# Patient Record
Sex: MALE | Race: WHITE | NOT HISPANIC OR LATINO | Employment: FULL TIME | URBAN - METROPOLITAN AREA
[De-identification: names, ages, dates, MRNs, and addresses within clinical notes are randomized per-mention and may not be internally consistent; named-entity substitution may affect disease eponyms.]

---

## 2017-02-23 ENCOUNTER — ALLSCRIPTS OFFICE VISIT (OUTPATIENT)
Dept: OTHER | Facility: OTHER | Age: 47
End: 2017-02-23

## 2017-09-06 ENCOUNTER — GENERIC CONVERSION - ENCOUNTER (OUTPATIENT)
Dept: OTHER | Facility: OTHER | Age: 47
End: 2017-09-06

## 2017-09-20 ENCOUNTER — GENERIC CONVERSION - ENCOUNTER (OUTPATIENT)
Dept: OTHER | Facility: OTHER | Age: 47
End: 2017-09-20

## 2017-10-03 ENCOUNTER — GENERIC CONVERSION - ENCOUNTER (OUTPATIENT)
Dept: OTHER | Facility: OTHER | Age: 47
End: 2017-10-03

## 2017-10-11 ENCOUNTER — GENERIC CONVERSION - ENCOUNTER (OUTPATIENT)
Dept: OTHER | Facility: OTHER | Age: 47
End: 2017-10-11

## 2017-11-08 ENCOUNTER — GENERIC CONVERSION - ENCOUNTER (OUTPATIENT)
Dept: OTHER | Facility: OTHER | Age: 47
End: 2017-11-08

## 2018-01-05 ENCOUNTER — GENERIC CONVERSION - ENCOUNTER (OUTPATIENT)
Dept: FAMILY MEDICINE CLINIC | Facility: CLINIC | Age: 48
End: 2018-01-05

## 2018-01-13 VITALS
DIASTOLIC BLOOD PRESSURE: 88 MMHG | HEIGHT: 73 IN | TEMPERATURE: 97.4 F | SYSTOLIC BLOOD PRESSURE: 134 MMHG | BODY MASS INDEX: 32.12 KG/M2 | WEIGHT: 242.38 LBS | HEART RATE: 68 BPM

## 2018-01-22 VITALS
HEART RATE: 70 BPM | RESPIRATION RATE: 16 BRPM | DIASTOLIC BLOOD PRESSURE: 70 MMHG | SYSTOLIC BLOOD PRESSURE: 120 MMHG | BODY MASS INDEX: 32.74 KG/M2 | HEIGHT: 73 IN | WEIGHT: 247 LBS | TEMPERATURE: 96.8 F

## 2018-03-10 RX ORDER — LOSARTAN POTASSIUM AND HYDROCHLOROTHIAZIDE 12.5; 5 MG/1; MG/1
1 TABLET ORAL DAILY
COMMUNITY
Start: 2014-07-11 | End: 2018-11-06 | Stop reason: SDUPTHER

## 2018-03-13 ENCOUNTER — OFFICE VISIT (OUTPATIENT)
Dept: FAMILY MEDICINE CLINIC | Facility: CLINIC | Age: 48
End: 2018-03-13
Payer: COMMERCIAL

## 2018-03-13 VITALS
RESPIRATION RATE: 20 BRPM | HEIGHT: 72 IN | TEMPERATURE: 97.7 F | HEART RATE: 72 BPM | DIASTOLIC BLOOD PRESSURE: 82 MMHG | WEIGHT: 281.4 LBS | SYSTOLIC BLOOD PRESSURE: 130 MMHG | BODY MASS INDEX: 38.11 KG/M2

## 2018-03-13 DIAGNOSIS — M77.51 BURSITIS OF HEEL, RIGHT: ICD-10-CM

## 2018-03-13 DIAGNOSIS — B35.1 ONYCHOMYCOSIS: ICD-10-CM

## 2018-03-13 DIAGNOSIS — I10 ESSENTIAL HYPERTENSION: Primary | ICD-10-CM

## 2018-03-13 PROBLEM — K13.70 LESION OF MOUTH: Status: ACTIVE | Noted: 2018-03-13

## 2018-03-13 PROCEDURE — 99214 OFFICE O/P EST MOD 30 MIN: CPT | Performed by: FAMILY MEDICINE

## 2018-03-13 NOTE — PROGRESS NOTES
FAMILY PRACTICE OFFICE VISIT       NAME: Bob Martínez  AGE: 50 y o  SEX: male       : 1970        MRN: [id number]    DATE: 3/13/2018  TIME: 8:42 AM    Assessment and Plan     Problem List Items Addressed This Visit     Hypertension - Primary     Hypertension  Patient blood pressure is stable at this time he will continue with current regimen of medications  Patient will obtain blood work as ordered         Relevant Medications    losartan-hydrochlorothiazide (HYZAAR) 50-12 5 mg per tablet    ciclopirox (PENLAC) 8 % solution    Other Relevant Orders    CBC    Comprehensive metabolic panel    Lipid panel    TSH, 3rd generation    Onychomycosis     Onychomycosis  Patient given prescription for Penlac solution to use once daily as directed and once a week removing with alcohol swab  Patient will continue for several months to see if his nail growth improves         Bursitis of heel, right     Heel bursitis  Patient was instructed on stretching exercises for his Achilles tendon  He was also referred to [facility] podiatry for further evaluation                 There are no Patient Instructions on file for this visit  Chief Complaint     Chief Complaint   Patient presents with    Follow-up     Pt is here for 6 month follow up  Pt is still having left knee pain and pain on the bottom of both feet   Nail Problem     Pt states he has fungus on his 4th toe on right foot        History of Present Illness     Patient states he is struggling with his left knee pain despite seeing orthopedist   He has had 3 Hyalgan injections as well as cortisone injection  He has not been able to walk due to pains in his knee and feet  He has gained all his weight that he had lost with dieting  Review of Systems   Review of Systems   Constitutional: Negative  HENT:        Patient has noticed a few buccal mucosal lesions are nontender that have developed over the past several weeks   Respiratory: Negative  Cardiovascular: Negative  Gastrointestinal: Negative  Genitourinary: Negative  Musculoskeletal:        As per HPI   Skin:        Patient has right 4th toe that appears to be discolored and cracking       Active Problem List     Patient Active Problem List   Diagnosis    Hypertension    Esophageal reflux    Onychomycosis    Bursitis of heel, right    Lesion of mouth       Past Medical History:  Past Medical History:   Diagnosis Date    Acute meniscal tear of left knee     Carpal tunnel syndrome, unspecified upper limb        Past Surgical History:  No past surgical history on file  Family History:  Family History   Problem Relation Age of Onset    Atrial fibrillation Mother      CARDIAC PACEMAKER    Parkinsonism Mother     Bleeding Disorder Father      ACTIVE INTERNAL BLEEDING    Liver disease Father      LIVER DAMAGE       Social History:  Social History     Social History    Marital status: Single     Spouse name: N/A    Number of children: N/A    Years of education: N/A     Occupational History    Not on file  Social History Main Topics    Smoking status: Never Smoker    Smokeless tobacco: Never Used    Alcohol use No    Drug use: No    Sexual activity: Not on file     Other Topics Concern    Not on file     Social History Narrative    No narrative on file       Objective     Vitals:    03/13/18 0802   BP: 130/82   Pulse: 72   Resp: 20   Temp: 97 7 °F (36 5 °C)     Wt Readings from Last 3 Encounters:   03/13/18 128 kg (281 lb 6 4 oz)   09/06/17 112 kg (247 lb)   02/23/17 110 kg (242 lb 6 oz)       Physical Exam   Constitutional: No distress  HENT:   Mouth/Throat: Oropharynx is clear and moist  No oropharyngeal exudate  Tympanic membranes within normal limits bilaterally  The patient has 1-2 nontender vesicular type of lesions bilateral buccal mucosa of  There approximately 2 millimeters in diameter  There is no discharge noted     Cardiovascular:   Regular rate and rhythm with no murmurs   Pulmonary/Chest:   Lungs are clear to auscultation without wheezes,rales, or rhonchi   Musculoskeletal: He exhibits no edema  Right foot is minimally tender on the heel area of calcaneus   Lymphadenopathy:     He has no cervical adenopathy  Skin:   Patient has right 4th toenail that has yellow discoloration and cracking edge         Pertinent Laboratory/Diagnostic Studies:  Lab Results   Component Value Date    GLUCOSE 84 10/17/2013    BUN 13 02/18/2017    CREATININE 0 93 02/18/2017    CALCIUM 9 0 02/18/2017     02/18/2017    K 4 3 02/18/2017    CO2 32 (H) 02/18/2017     02/18/2017     Lab Results   Component Value Date    ALT 20 02/18/2017    AST 19 02/18/2017    ALKPHOS 55 02/18/2017    BILITOT 0 6 02/18/2017       No results found for: WBC, HGB, HCT, MCV, PLT    No results found for: TSH    Lab Results   Component Value Date    CHOL 175 10/17/2013     Lab Results   Component Value Date    TRIG 70 10/17/2013     Lab Results   Component Value Date    HDL 50 10/17/2013     Lab Results   Component Value Date    LDLCALC 111 10/17/2013     No results found for: HGBA1C    Results for orders placed or performed in visit on 08/25/16   COMPREHENSIVE METABOLIC PANEL (HISTORICAL)   Result Value Ref Range    Glucose 88 65 - 99 mg/dL    BUN 13 7 - 25 mg/dL    Creatinine 0 93 0 60 - 1 35 mg/dL    EGFR-AMERICAN CALC 97 > OR = 60 mL/min/1 73m2    eGFR  112 > OR = 60 mL/min/1 73m2    BUN/CREA Ratio NOT APPLICABLE 6 - 22 (calc)    Sodium 139 135 - 146 mmol/L    Potassium 4 3 3 5 - 5 3 mmol/L    Chloride 102 98 - 110 mmol/L    CO2 32 (H) 20 - 31 mmol/L    Calcium 9 0 8 6 - 10 3 mg/dL    Total Protein 6 6 6 1 - 8 1 g/dL    Albumin 4 2 3 6 - 5 1 g/dL    GAMMA GLOBULIN 2 4 1 9 - 3 7 g/dL (calc)    A/G RATIO 1 8 1 0 - 2 5 (calc)    Total Bilirubin 0 6 0 2 - 1 2 mg/dL    Alkaline Phosphatase 55 40 - 115 U/L    AST 19 10 - 40 U/L    ALT 20 9 - 46 U/L       Orders Placed This Encounter   Procedures    CBC    Comprehensive metabolic panel    Lipid panel    TSH, 3rd generation       ALLERGIES:  No Known Allergies    Current Medications     Current Outpatient Prescriptions   Medication Sig Dispense Refill    losartan-hydrochlorothiazide (HYZAAR) 50-12 5 mg per tablet Take 1 tablet by mouth daily      ciclopirox (PENLAC) 8 % solution Apply topically daily at bedtime 6 6 mL 5     No current facility-administered medications for this visit            Health Maintenance     Health Maintenance   Topic Date Due    HIV SCREENING  1970    Depression Screening PHQ-9  12/11/1980    DTaP,Tdap,and Td Vaccines (1 - Tdap) 12/11/1989    INFLUENZA VACCINE  09/01/2017     Immunization History   Administered Date(s) Administered    Influenza TIV (IM) 70/25/8382       Lizette Valenzuela MD

## 2018-03-13 NOTE — ASSESSMENT & PLAN NOTE
Hypertension  Patient blood pressure is stable at this time he will continue with current regimen of medications    Patient will obtain blood work as ordered

## 2018-03-13 NOTE — ASSESSMENT & PLAN NOTE
Heel bursitis  Patient was instructed on stretching exercises for his Achilles tendon    He was also referred to [facility] [place] [place] for further evaluation

## 2018-03-13 NOTE — ASSESSMENT & PLAN NOTE
Lesion in mouth  Patient with benign-appearing vesicles  Patient will follow up with his dentist later this month for further evaluation    If needed patient will be referred to oral surgeon if needs further clarification

## 2018-03-13 NOTE — ASSESSMENT & PLAN NOTE
Onychomycosis  Patient given prescription for Penlac solution to use once daily as directed and once a week removing with alcohol swab    Patient will continue for several months to see if his nail growth improves

## 2018-07-02 ENCOUNTER — EVALUATION (OUTPATIENT)
Dept: PHYSICAL THERAPY | Facility: CLINIC | Age: 48
End: 2018-07-02
Payer: COMMERCIAL

## 2018-07-02 DIAGNOSIS — M25.561 ACUTE PAIN OF RIGHT KNEE: Primary | ICD-10-CM

## 2018-07-02 PROCEDURE — 97110 THERAPEUTIC EXERCISES: CPT

## 2018-07-02 PROCEDURE — G8978 MOBILITY CURRENT STATUS: HCPCS

## 2018-07-02 PROCEDURE — 97161 PT EVAL LOW COMPLEX 20 MIN: CPT

## 2018-07-02 PROCEDURE — G8979 MOBILITY GOAL STATUS: HCPCS

## 2018-07-02 NOTE — PROGRESS NOTES
PT Evaluation     Today's date: 2018  Patient name: Karol Cueto  : 1970  MRN: [id number]  Referring provider: Irina Nash PA-C  Dx:   Encounter Diagnosis     ICD-10-CM    1  Acute pain of right knee M25 561                   Assessment/Plan   Patient presents to PT post operative quad tendon repair on 5/15/18  Patient with decreased quad strength, limited knee ROM, and increased pain with functional activities  Patient may benefit from skilled PT in order to address the above impairments, progress patient toward pain-free prior level of function, and establish home exercise program  Thank you for this referral   ST  Pt will increase knee flexion to 90 deg within 3 weeks  2  Pt will improve knee extension strength by 1/2 grade within 3 weeks  LT  Pt will be independent with HEP by discharge  2  Pt will return to pain-free PLOF  Plan: 2-3x/week for 8 weeks      Subjective   Pt tore is quadriceps tendon and had surgery to repair it on 5/15/18  Pt reports he was in a straight leg brace for about a month an a half  Is now in a brace that allows flexion     Pain:   Worst: 1/10   Best: 0/10  Aggravating factors: stairs, stepping into the shower  Alleviating factors: rest  Previous injury: none  Occupation: desk job  Goals: driving a car, bowling, exercising on stationary bike      Objective   Knee ROM:  Flexion: 78 deg  Extension: 0 deg    Knee strength:  Extension: 3/5  Flexion: 3+/5    Patellar mobility: hypo all directions    Quad set: good activation  Quad lag: positive; unable to perform SLR        Precautions: open brace to 90 on 18, open brace fully 18    Daily Treatment Diary     Manual              PRN                                                                     Exercise Diary              Heel slides 10 w/o strap, 10 w/ strap            Quad sets 5"x10            SAQ 2x10            Standing heel raises 2x10            Squats 2x10            Step ups 4" 2x10 Bike NV            Prone quad stretch NV            Bridging NV                                                                                                                                                               Modalities              PRN

## 2018-07-02 NOTE — LETTER
2018    Eric Harper PA-C  [address]  [facility]    Patient: Anh Yang   YOB: 1970   Date of Visit: 2018     Encounter Diagnosis     ICD-10-CM    1  Acute pain of right knee M25 561        Dear Dr Baker Mention:    Please review the attached Plan of Care from [address] [place] [address],[address] recent visit  Please verify that you are in agreement by signing the attached document and sending it back to our office  If you have any questions or concerns, please don't hesitate to call  Sincerely,    Sal Walls, PT      Referring Provider:      I certify that I have read the below Plan of Care and certify the need for these services furnished under this plan of treatment while under my care  Eric Harper PA-C  [address]  [facility]: [phone]          PT Evaluation     Today's date: 2018  Patient name: Anh Yang  : 1970  MRN: [id number]  Referring provider: Stephany Contreras PA-C  Dx:   Encounter Diagnosis     ICD-10-CM    1  Acute pain of right knee M25 561                   Assessment/Plan   Patient presents to PT post operative quad tendon repair on 5/15/18  Patient with decreased quad strength, limited knee ROM, and increased pain with functional activities  Patient may benefit from skilled PT in order to address the above impairments, progress patient toward pain-free prior level of function, and establish home exercise program  Thank you for this referral   ST  Pt will increase knee flexion to 90 deg within 3 weeks  2  Pt will improve knee extension strength by 1/2 grade within 3 weeks  LT  Pt will be independent with HEP by discharge  2  Pt will return to pain-free PLOF  Plan: 2-3x/week for 8 weeks      Subjective   Pt tore is quadriceps tendon and had surgery to repair it on 5/15/18  Pt reports he was in a straight leg brace for about a month an a half  Is now in a brace that allows flexion     Pain:   Worst: 1/10   Best: 0/10  Aggravating factors: stairs, stepping into the shower  Alleviating factors: rest  Previous injury: none  Occupation: desk job  Goals: driving a car, bowling, exercising on stationary bike      Objective   Knee ROM:  Flexion: 78 deg  Extension: 0 deg    Knee strength:  Extension: 3/5  Flexion: 3+/5    Patellar mobility: hypo all directions    Quad set: good activation  Quad lag: positive; unable to perform SLR        Precautions: open brace to 90 on 7/9/18, open brace fully 7/16/18    Daily Treatment Diary     Manual              PRN                                                                     Exercise Diary  7/2            Heel slides 10 w/o strap, 10 w/ strap            Quad sets 5"x10            SAQ 2x10            Standing heel raises 2x10            Squats 2x10            Step ups 4" 2x10            Bike NV            Prone quad stretch NV            Bridging NV                                                                                                                                                               Modalities              PRN

## 2018-07-05 ENCOUNTER — TRANSCRIBE ORDERS (OUTPATIENT)
Dept: PHYSICAL THERAPY | Facility: CLINIC | Age: 48
End: 2018-07-05

## 2018-07-05 ENCOUNTER — OFFICE VISIT (OUTPATIENT)
Dept: PHYSICAL THERAPY | Facility: CLINIC | Age: 48
End: 2018-07-05
Payer: COMMERCIAL

## 2018-07-05 DIAGNOSIS — M25.561 PAIN IN JOINT OF RIGHT KNEE: Primary | ICD-10-CM

## 2018-07-05 DIAGNOSIS — M25.561 ACUTE PAIN OF RIGHT KNEE: Primary | ICD-10-CM

## 2018-07-05 PROCEDURE — 97110 THERAPEUTIC EXERCISES: CPT

## 2018-07-05 PROCEDURE — 97112 NEUROMUSCULAR REEDUCATION: CPT

## 2018-07-05 NOTE — PROGRESS NOTES
Daily Note     Today's date: 2018  Patient name: Joellen Bedoya  : 1970  MRN: [id number]  Referring provider: Kong Leos PA-C  Dx:   Encounter Diagnosis     ICD-10-CM    1  Acute pain of right knee M25 561                   Subjective: Pt reports no pain or issues with the initial HEP  Reports continued annoyance with the brace  Objective: See treatment diary below      Assessment: Tolerated treatment well  Patient demonstrated fatigue post treatment, exhibited good technique with therapeutic exercises and would benefit from continued PT  Unable to tolerate prone quad stretch 2/2 hamstring cramps  Plan: Continue per plan of care       Precautions: open brace to 90 on 18, open brace fully 18    Daily Treatment Diary     Manual              PRN                                                                     Exercise Diary             Heel slides 10 w/o strap, 10 w/ strap 10 w/o strap, 20 w/ strap           Quad sets 5"x10 5"x15           SAQ 2x10 3x10           Standing heel raises 2x10 2x10           Squats 2x10 2x10           Step ups 4" 2x10 6" 2x10           Bike NV 5'           Prone quad stretch NV unable           Bridging NV 2x10           SLR  x8 AA           Standing HS curls  NV           SLS  3x30"                                                                                                                       Modalities              PRN

## 2018-07-09 ENCOUNTER — OFFICE VISIT (OUTPATIENT)
Dept: PHYSICAL THERAPY | Facility: CLINIC | Age: 48
End: 2018-07-09
Payer: COMMERCIAL

## 2018-07-09 DIAGNOSIS — M25.561 ACUTE PAIN OF RIGHT KNEE: Primary | ICD-10-CM

## 2018-07-09 PROCEDURE — 97112 NEUROMUSCULAR REEDUCATION: CPT

## 2018-07-09 PROCEDURE — 97110 THERAPEUTIC EXERCISES: CPT

## 2018-07-09 NOTE — PROGRESS NOTES
Daily Note     Today's date: 2018  Patient name: Elise Jang  : 1970  MRN: [id number]  Referring provider: Pastor Quevedo PA-C  Dx:   Encounter Diagnosis     ICD-10-CM    1  Acute pain of right knee M25 561                   Subjective: Pt with no complaints upon arrival       Objective: See treatment diary below      Assessment: Pt tolerating therex well with improved SLR at today's session  Pt no longer requiring AA  Opened pt's brace to 90 deg at today's visit  Pt will continue to benefit from skilled PT  Plan: Continue per plan of care       Precautions: quad tendon repair 5/15/18; open brace to 90 on 18, open brace fully 18    Daily Treatment Diary     Manual              PRN                                                                     Exercise Diary            Heel slides 10 w/o strap, 10 w/ strap 10 w/o strap, 20 w/ strap 10 w/o strap, 20 w/ strap          Quad sets 5"x10 5"x15 5"x20          SAQ 2x10 3x10 3x10 1#          Standing heel raises 2x10 2x10 2x10          Squats 2x10 2x10 2x10          Step ups 4" 2x10 6" 2x10 6" 2x10          Bike NV 5' 8'          Prone quad stretch NV unable 3x20"          Bridging NV 2x10 2x10          SLR  x8 AA 2x10          Standing HS curls  NV 2x10          SLS  3x30" 3x30"          Lunges   x10          Side lying hip ABD   2x10                                                                                            Modalities              PRN

## 2018-07-12 ENCOUNTER — OFFICE VISIT (OUTPATIENT)
Dept: PHYSICAL THERAPY | Facility: CLINIC | Age: 48
End: 2018-07-12
Payer: COMMERCIAL

## 2018-07-12 DIAGNOSIS — M25.561 ACUTE PAIN OF RIGHT KNEE: Primary | ICD-10-CM

## 2018-07-12 PROCEDURE — 97110 THERAPEUTIC EXERCISES: CPT

## 2018-07-12 PROCEDURE — 97112 NEUROMUSCULAR REEDUCATION: CPT

## 2018-07-12 NOTE — PROGRESS NOTES
Daily Note     Today's date: 2018  Patient name: Alexsandra Galarza  : 1970  MRN: [id number]  Referring provider: Adan Lang PA-C  Dx:   Encounter Diagnosis     ICD-10-CM    1  Acute pain of right knee M25 561                   Subjective: Patient denies pain while walking, but reports some pain with the stairs which resolves quickly  Objective: See treatment diary below    Precautions: quad tendon repair 5/15/18; open brace to 90 on 18, open brace fully 18    Daily Treatment Diary     Manual              PRN                                                                     Exercise Diary           Heel slides 10 w/o strap, 10 w/ strap 10 w/o strap, 20 w/ strap 10 w/o strap, 20 w/ strap 10  W/o  Strap,20 w/  strap         Quad sets 5"x10 5"x15 5"x20 5"x20         SAQ 2x10 3x10 3x10 1# 3x10  1#         Standing heel raises 2x10 2x10 2x10 3x10         Squats 2x10 2x10 2x10 3x10         Step ups 4" 2x10 6" 2x10 6" 2x10 6"  3x10         Bike NV 5' 8' 10'         Prone quad stretch NV unable 3x20" 3x20"         Bridging NV 2x10 2x10 3x10         SLR  x8 AA 2x10 3x10         Standing HS curls  NV 2x10 2x15         SLS  3x30" 3x30" 3x30"         Lunges   x10 2x10         Side lying hip ABD   2x10 3x10                                                                                           Modalities              PRN                                             Assessment: Tolerated treatment well  Good quad control during supine SLR  Hamstring spasms reported during prone quad stretch  Patient would benefit from continued therapy  Plan: Continue therapy as tolerated per plan of care

## 2018-07-16 ENCOUNTER — OFFICE VISIT (OUTPATIENT)
Dept: PHYSICAL THERAPY | Facility: CLINIC | Age: 48
End: 2018-07-16
Payer: COMMERCIAL

## 2018-07-16 DIAGNOSIS — M25.561 ACUTE PAIN OF RIGHT KNEE: Primary | ICD-10-CM

## 2018-07-16 PROCEDURE — 97110 THERAPEUTIC EXERCISES: CPT

## 2018-07-16 PROCEDURE — 97112 NEUROMUSCULAR REEDUCATION: CPT

## 2018-07-16 NOTE — PROGRESS NOTES
Blood drawn in triage   Daily Note     Today's date: 2018  Patient name: Edwar Mcgraw  : 1970  MRN: [id number]  Referring provider: Pallavi Pedroza PA-C  Dx:   Encounter Diagnosis     ICD-10-CM    1  Acute pain of right knee M25 561                   Subjective: Patient reports doing pretty well today having minimal soreness  Pt is very eager to get out of the brace  Objective: See treatment diary below    Precautions: quad tendon repair 5/15/18; open brace to 90 on 18, open brace fully 18    Daily Treatment Diary     Manual              PRN                                                                     Exercise Diary          Heel slides 10 w/o strap, 10 w/ strap 10 w/o strap, 20 w/ strap 10 w/o strap, 20 w/ strap 10  W/o  Strap,20 w/  strap 10 w/o strap 20 w/ strap        Quad sets 5"x10 5"x15 5"x20 5"x20 5"x20        SAQ 2x10 3x10 3x10 1# 3x10  1# 3x10 1#        Standing heel raises 2x10 2x10 2x10 3x10 3x10        Squats 2x10 2x10 2x10 3x10 3x10        Step ups 4" 2x10 6" 2x10 6" 2x10 6"  3x10 6" 3x10        Bike NV 5' 8' 10' 10'        Prone quad stretch NV unable 3x20" 3x20" 20"x3        Bridging NV 2x10 2x10 3x10 3x10        SLR  x8 AA 2x10 3x10 3x10        Standing HS curls  NV 2x10 2x15 2x15        SLS  3x30" 3x30" 3x30" 3x30"        Lunges   x10 2x10 2x10        Side lying hip ABD   2x10 3x10 3x10                                                                                          Modalities              PRN                                             Assessment: Tolerated treatment well demonstrating a good quad contraction with all table top TE  Hamstring spasms were not reported during prone quad stretch today  Unlocked brace to fully open as per precautions  Patient would benefit from continued therapy  Plan: Continue therapy as tolerated per plan of care

## 2018-07-19 ENCOUNTER — OFFICE VISIT (OUTPATIENT)
Dept: PHYSICAL THERAPY | Facility: CLINIC | Age: 48
End: 2018-07-19
Payer: COMMERCIAL

## 2018-07-19 DIAGNOSIS — M25.561 ACUTE PAIN OF RIGHT KNEE: Primary | ICD-10-CM

## 2018-07-19 PROCEDURE — 97110 THERAPEUTIC EXERCISES: CPT

## 2018-07-19 PROCEDURE — 97530 THERAPEUTIC ACTIVITIES: CPT

## 2018-07-19 PROCEDURE — 97112 NEUROMUSCULAR REEDUCATION: CPT

## 2018-07-23 ENCOUNTER — OFFICE VISIT (OUTPATIENT)
Dept: PHYSICAL THERAPY | Facility: CLINIC | Age: 48
End: 2018-07-23
Payer: COMMERCIAL

## 2018-07-23 DIAGNOSIS — M25.561 ACUTE PAIN OF RIGHT KNEE: Primary | ICD-10-CM

## 2018-07-23 PROCEDURE — 97530 THERAPEUTIC ACTIVITIES: CPT

## 2018-07-23 PROCEDURE — 97112 NEUROMUSCULAR REEDUCATION: CPT

## 2018-07-23 PROCEDURE — 97110 THERAPEUTIC EXERCISES: CPT

## 2018-07-23 NOTE — PROGRESS NOTES
Daily Note     Today's date: 2018  Patient name: Joellen Bedoya  : 1970  MRN: [id number]  Referring provider: Kong Leos PA-C  Dx:   Encounter Diagnosis     ICD-10-CM    1  Acute pain of right knee M25 561                 Subjective: Patient reports the doctor would like him to continue sleeping in the knee brace for two more weeks  Patient is scheduled to return to the doctor on 18  Objective: See treatment diary below  Precautions: quad tendon repair 5/15/18; open brace to 90 on 18, open brace fully 18    Daily Treatment Diary     Manual              PRN                                                                     Exercise Diary        Heel slides 10 w/o strap, 10 w/ strap 10 w/o strap, 20 w/ strap 10 w/o strap, 20 w/ strap 10  W/o  Strap,20 w/  strap 10 w/o strap 20 w/ strap 10 w/o strap 20 w/ strap 10 w/o  Strap  20 w/ strap      Quad sets 5"x10 5"x15 5"x20 5"x20 5"x20 5"x20 5"x20      SAQ 2x10 3x10 3x10 1# 3x10  1# 3x10 1# 3x10 2# 3x10  3#      Standing heel raises 2x10 2x10 2x10 3x10 3x10 3x10 3x10      Squats 2x10 2x10 2x10 3x10 3x10 3x10 3x10      Step ups 4" 2x10 6" 2x10 6" 2x10 6"  3x10 6" 3x10 10" 3x10 10"  3x10      Bike NV 5' 8' 10' 10' 8' 8'      Prone quad stretch NV unable 3x20" 3x20" 20"x3 3x30" 30"x4      Bridging NV 2x10 2x10 3x10 3x10 2x10 B/L with knee ext 2x10  B/L  With knee ext      SLR  x8 AA 2x10 3x10 3x10 3x10 1# 3x10  1 5#      Standing HS curls  NV 2x10 2x15 2x15 2x15 3x10      SLS  3x30" 3x30" 3x30" 3x30" 3x30" Blue foam  30"x3      Lunges   x10 2x10 2x10 2x10 2x10      Side lying hip ABD   2x10 3x10 3x10 3x10 1# 3x10  1 5#      Hamstring stretch      2x30" 30"x4      Stair training       3 flights                                                              Modalities              PRN                                         Assessment: Good single leg balance noted    Patient experiences mild difficulty descending the stairs  Plan: Continue treatment as per PT plan of care

## 2018-07-26 ENCOUNTER — OFFICE VISIT (OUTPATIENT)
Dept: PHYSICAL THERAPY | Facility: CLINIC | Age: 48
End: 2018-07-26
Payer: COMMERCIAL

## 2018-07-26 DIAGNOSIS — M25.561 ACUTE PAIN OF RIGHT KNEE: Primary | ICD-10-CM

## 2018-07-26 PROCEDURE — 97530 THERAPEUTIC ACTIVITIES: CPT

## 2018-07-26 PROCEDURE — 97112 NEUROMUSCULAR REEDUCATION: CPT

## 2018-07-26 PROCEDURE — 97110 THERAPEUTIC EXERCISES: CPT

## 2018-07-26 NOTE — PROGRESS NOTES
Daily Note     Today's date: 2018  Patient name: Yudy Yin  : 1970  MRN: [id number]  Referring provider: Asaf Link PA-C  Dx:   Encounter Diagnosis     ICD-10-CM    1  Acute pain of right knee M25 561                 Subjective: Patient with no complaints upon arrival to PT  Notes some stiffness when sitting all day at work  Instructed pt to get up and walk every hour  Objective: See treatment diary below      Precautions: quad tendon repair 5/15/18; open brace to 90 on 18, open brace fully 18    Daily Treatment Diary     Manual              PRN                                                                     Exercise Diary       Heel slides 10 w/o strap, 10 w/ strap 10 w/o strap, 20 w/ strap 10 w/o strap, 20 w/ strap 10  W/o  Strap,20 w/  strap 10 w/o strap 20 w/ strap 10 w/o strap 20 w/ strap 10 w/o  Strap  20 w/ strap D/C     Quad sets 5"x10 5"x15 5"x20 5"x20 5"x20 5"x20 5"x20 5"x20     SAQ 2x10 3x10 3x10 1# 3x10  1# 3x10 1# 3x10 2# 3x10  3# 3x10 3#     Standing heel raises 2x10 2x10 2x10 3x10 3x10 3x10 3x10 D/C     Squats 2x10 2x10 2x10 3x10 3x10 3x10 3x10 NP     Step ups 4" 2x10 6" 2x10 6" 2x10 6"  3x10 6" 3x10 10" 3x10 10"  3x10 NP     Bike NV 5' 8' 10' 10' 8' 8' 8'  Level 2     Prone quad stretch NV unable 3x20" 3x20" 20"x3 3x30" 30"x4 30"x4     Bridging NV 2x10 2x10 3x10 3x10 2x10 B/L with knee ext 2x10  B/L  With knee ext 2x10     SLR  x8 AA 2x10 3x10 3x10 3x10 1# 3x10  1 5# 3x10 1 5#     Standing HS curls  NV 2x10 2x15 2x15 2x15 3x10 D/C     SLS  3x30" 3x30" 3x30" 3x30" 3x30" Blue foam  30"x3 Blue foam 3x30"     Lunges   x10 2x10 2x10 2x10 2x10 2x10     Side lying hip ABD   2x10 3x10 3x10 3x10 1# 3x10  1 5# 3x10 1 5#     Hamstring stretch      2x30" 30"x4 30"x4     Stair training       3 flights 4 flights     Leg press (Single leg)        1 pl 3x10 2pl NV    Custer knee flex        60# 3x10     Apollo knee ext B/L        20# 2x10 TKE        BLK x30         Modalities              PRN                                         Assessment: Pt tolerating progression of therex well  Patient experiences mild difficulty descending the stairs, but is improved with repetition  Plan: Continue treatment as per PT plan of care

## 2018-07-30 ENCOUNTER — APPOINTMENT (OUTPATIENT)
Dept: PHYSICAL THERAPY | Facility: CLINIC | Age: 48
End: 2018-07-30
Payer: COMMERCIAL

## 2018-08-02 ENCOUNTER — OFFICE VISIT (OUTPATIENT)
Dept: PHYSICAL THERAPY | Facility: CLINIC | Age: 48
End: 2018-08-02
Payer: COMMERCIAL

## 2018-08-02 DIAGNOSIS — M25.561 ACUTE PAIN OF RIGHT KNEE: Primary | ICD-10-CM

## 2018-08-02 PROCEDURE — 97530 THERAPEUTIC ACTIVITIES: CPT

## 2018-08-02 PROCEDURE — 97110 THERAPEUTIC EXERCISES: CPT

## 2018-08-02 PROCEDURE — 97112 NEUROMUSCULAR REEDUCATION: CPT

## 2018-08-02 NOTE — PROGRESS NOTES
Daily Note     Today's date: 2018  Patient name: Kaden Main  : 1970  MRN: [id number]  Referring provider: Hildegard Ganser, PA-C  Dx:   Encounter Diagnosis     ICD-10-CM    1  Acute pain of right knee M25 561                 Subjective: Patient continues to report some stiffness when sitting for prolonged periods of time  Pt notes improvement with stairs since LV  Objective: See treatment diary below      Precautions: quad tendon repair 5/15/18; open brace to 90 on 18, open brace fully 18    Daily Treatment Diary     Manual              PRN                                                                     Exercise Diary      Heel slides 10 w/o strap, 10 w/ strap 10 w/o strap, 20 w/ strap 10 w/o strap, 20 w/ strap 10  W/o  Strap,20 w/  strap 10 w/o strap 20 w/ strap 10 w/o strap 20 w/ strap 10 w/o  Strap  20 w/ strap D/C     Quad sets 5"x10 5"x15 5"x20 5"x20 5"x20 5"x20 5"x20 5"x20 5"x20    SAQ 2x10 3x10 3x10 1# 3x10  1# 3x10 1# 3x10 2# 3x10  3# 3x10 3# 3x10 3#    Standing heel raises 2x10 2x10 2x10 3x10 3x10 3x10 3x10 D/C     Squats 2x10 2x10 2x10 3x10 3x10 3x10 3x10 NP     Step ups 4" 2x10 6" 2x10 6" 2x10 6"  3x10 6" 3x10 10" 3x10 10"  3x10 NP     Bike NV 5' 8' 10' 10' 8' 8' 8'  Level 2 10' level 2    Prone quad stretch NV unable 3x20" 3x20" 20"x3 3x30" 30"x4 30"x4 30"x4    Bridging NV 2x10 2x10 3x10 3x10 2x10 B/L with knee ext 2x10  B/L  With knee ext 2x10 2x10    SLR  x8 AA 2x10 3x10 3x10 3x10 1# 3x10  1 5# 3x10 1 5# 3x10 1 5#    Standing HS curls  NV 2x10 2x15 2x15 2x15 3x10 D/C     SLS  3x30" 3x30" 3x30" 3x30" 3x30" Blue foam  30"x3 Blue foam 3x30" Blue foam 3x30"    Lunges   x10 2x10 2x10 2x10 2x10 2x10 2x10    Side lying hip ABD   2x10 3x10 3x10 3x10 1# 3x10  1 5# 3x10 1 5# 3x10 1 5$    Hamstring stretch      2x30" 30"x4 30"x4 4x30"    Stair training       3 flights 4 flights 5 flights    Leg press (Single leg)        1 pl 3x10 2pl 3x10 Apollo knee flex        60# 3x10 70# 3x10    Apollo knee ext B/L        20# 2x10 20# 3x10    TKE        BLK x30 BLK x30    Lateral step downs         x10 4" step        Modalities              PRN                                         Assessment: Pt tolerating progression of therex well  Pt performs therex with good technique  Notes fatigue post treatment and is especially challenged with lateral step downs  Will continue to benefit from skilled PT  Plan: Continue treatment as per PT plan of care   RTD: 8/6/18

## 2018-08-08 ENCOUNTER — OFFICE VISIT (OUTPATIENT)
Dept: PHYSICAL THERAPY | Facility: CLINIC | Age: 48
End: 2018-08-08
Payer: COMMERCIAL

## 2018-08-08 DIAGNOSIS — M25.561 ACUTE PAIN OF RIGHT KNEE: Primary | ICD-10-CM

## 2018-08-08 PROCEDURE — 97110 THERAPEUTIC EXERCISES: CPT | Performed by: PHYSICAL THERAPIST

## 2018-08-08 PROCEDURE — G8978 MOBILITY CURRENT STATUS: HCPCS | Performed by: PHYSICAL THERAPIST

## 2018-08-08 PROCEDURE — G8979 MOBILITY GOAL STATUS: HCPCS | Performed by: PHYSICAL THERAPIST

## 2018-08-08 PROCEDURE — 97112 NEUROMUSCULAR REEDUCATION: CPT | Performed by: PHYSICAL THERAPIST

## 2018-08-08 PROCEDURE — 97530 THERAPEUTIC ACTIVITIES: CPT | Performed by: PHYSICAL THERAPIST

## 2018-08-08 NOTE — PROGRESS NOTES
Daily Note     Today's date: 2018  Patient name: Elise Jang  : 1970  MRN: [id number]  Referring provider: Pastor Quevedo PA-C  Dx:   Encounter Diagnosis     ICD-10-CM    1  Acute pain of right knee M25 561                 Subjective: Patient reports that his physician is pleased with his progress and that he has a f/u in oct  Objective: See treatment diary below      Precautions: quad tendon repair 5/15/18; open brace to 90 on 18, open brace fully 18    Daily Treatment Diary     Manual              PRN                                                                     Exercise Diary     Heel slides 10 w/o strap, 10 w/ strap 10 w/o strap, 20 w/ strap 10 w/o strap, 20 w/ strap 10  W/o  Strap,20 w/  strap 10 w/o strap 20 w/ strap 10 w/o strap 20 w/ strap 10 w/o  Strap  20 w/ strap D/C     Quad sets 5"x10 5"x15 5"x20 5"x20 5"x20 5"x20 5"x20 5"x20 5"x20    SAQ 2x10 3x10 3x10 1# 3x10  1# 3x10 1# 3x10 2# 3x10  3# 3x10 3# 3x10 3#    Standing heel raises 2x10 2x10 2x10 3x10 3x10 3x10 3x10 D/C     Squats 2x10 2x10 2x10 3x10 3x10 3x10 3x10 NP     Step ups 4" 2x10 6" 2x10 6" 2x10 6"  3x10 6" 3x10 10" 3x10 10"  3x10 NP     Bike NV 5' 8' 10' 10' 8' 8' 8'  Level 2 10' level 2 10 level 3   Prone quad stretch NV unable 3x20" 3x20" 20"x3 3x30" 30"x4 30"x4 30"x4 4   Bridging NV 2x10 2x10 3x10 3x10 2x10 B/L with knee ext 2x10  B/L  With knee ext 2x10 2x10 30   SLR  x8 AA 2x10 3x10 3x10 3x10 1# 3x10  1 5# 3x10 1 5# 3x10 1 5# 30   Standing HS curls  NV 2x10 2x15 2x15 2x15 3x10 D/C     SLS  3x30" 3x30" 3x30" 3x30" 3x30" Blue foam  30"x3 Blue foam 3x30" Blue foam 3x30" Blue foam x4   Lunges   x10 2x10 2x10 2x10 2x10 2x10 2x10 30   Side lying hip ABD   2x10 3x10 3x10 3x10 1# 3x10  1 5# 3x10 1 5# 3x10 1 5$    Hamstring stretch      2x30" 30"x4 30"x4 4x30" 4   Stair training       3 flights 4 flights 5 flights 5 flights   Leg press (Single leg)        1 pl 3x10 2pl 3x10 3 plates 7D18   Apollo knee flex        60# 3x10 70# 3x10 70# x30   Apollo knee ext B/L        20# 2x10 20# 3x10 30#x30   TKE        BLK x30 BLK x30    Lateral step downs         x10 4" step 20   Sustained squat on rockerboard          30"x4   sidestepping          Blue tb x6 laps       Modalities              PRN                                         Assessment: Progressed therex as listed with complaints other then fatigue  Plan: Continue treatment as per PT plan of care  Plan to have 2-3 more sessions to progress strength training then DC to hep

## 2018-08-13 ENCOUNTER — OFFICE VISIT (OUTPATIENT)
Dept: PHYSICAL THERAPY | Facility: CLINIC | Age: 48
End: 2018-08-13
Payer: COMMERCIAL

## 2018-08-13 DIAGNOSIS — M25.561 ACUTE PAIN OF RIGHT KNEE: Primary | ICD-10-CM

## 2018-08-13 PROCEDURE — 97112 NEUROMUSCULAR REEDUCATION: CPT

## 2018-08-13 PROCEDURE — 97530 THERAPEUTIC ACTIVITIES: CPT

## 2018-08-13 PROCEDURE — 97110 THERAPEUTIC EXERCISES: CPT

## 2018-08-13 NOTE — PROGRESS NOTES
Daily Note     Today's date: 2018  Patient name: Oneil Rajan  : 1970  MRN: [id number]  Referring provider: Alicia Dhillon PA-C  Dx:   Encounter Diagnosis     ICD-10-CM    1  Acute pain of right knee M25 561                 Subjective: Patient reports knee pain when sitting for long periods  Notes occasional discomfort at night  Objective: See treatment diary below  Precautions: quad tendon repair 5/15/18; open brace to 90 on 18, open brace fully 18    Daily Treatment Diary     Manual              PRN                                                                     Exercise Diary     Heel slides D/C              Quad sets D/C            SAQ D/C            Standing heel raises D/C              Squats D/C            Step ups D/C            Bike 10'  L3         10 level 3   Prone quad stretch 30"x4         4   Bridging x30  B/L   With  Knee  ext         30   SLR 2#  3x10   x8 AA 2x10 3x10 3x10 3x10 1# 3x10  1 5# 3x10 1 5# 3x10 1 5# 30   Standing HS curls D/C              SLS FADY  Foam  30"x4         Blue foam x4   Lunges x30         30   Side lying hip ABD   2x10 3x10 3x10 3x10 1# 3x10  1 5# 3x10 1 5# 3x10 1 5$    Hamstring stretch 30"x4         4   Stair training 5  flights         5 flights   Leg press (Single leg) 4  Plates  N78         1 pl 3x10 2pl 3x10 3 plates 3O28   Apollo knee flex 80#  3x10         70# x30   Apollo knee ext B/L 40#  3x10           30#x30   TKE BLK  x30            Lateral step downs 4"step  X 20         20   Sustained squat on rockerboard 30"x4         30"x4   sidestepping FADY  6laps         Blue tb x6 laps       Modalities              PRN                                         Assessment: Performed exercise progressions w/o discomfort  Fatigue cont during and after tx  Will monitor  Plan: Continue treatment as per PT plan of care   Plan to have 2 more sessions to progress strength training then DC to hep

## 2018-08-16 ENCOUNTER — OFFICE VISIT (OUTPATIENT)
Dept: PHYSICAL THERAPY | Facility: CLINIC | Age: 48
End: 2018-08-16
Payer: COMMERCIAL

## 2018-08-16 DIAGNOSIS — M25.561 ACUTE PAIN OF RIGHT KNEE: Primary | ICD-10-CM

## 2018-08-16 PROCEDURE — 97530 THERAPEUTIC ACTIVITIES: CPT

## 2018-08-16 PROCEDURE — 97112 NEUROMUSCULAR REEDUCATION: CPT

## 2018-08-16 PROCEDURE — 97110 THERAPEUTIC EXERCISES: CPT

## 2018-08-16 NOTE — PROGRESS NOTES
Daily Note     Today's date: 2018  Patient name: Oralia Sainz  : 1970  MRN: [id number]  Referring provider: Azael Wisdom PA-C  Dx:   Encounter Diagnosis     ICD-10-CM    1  Acute pain of right knee M25 561                 Subjective: Patient reports he has a "little bit" f pain negotiating stairs and when sitting for extended periods  Objective: See treatment diary below  Precautions: quad tendon repair 5/15/18; open brace to 90 on 18, open brace fully 18    Daily Treatment Diary     Manual              PRN                                                                     Exercise Diary     Heel slides D/C              Quad sets D/C            SAQ D/C            Standing heel raises D/C              Squats D/C            Step ups D/C            Bike 10'  L3 10'  L3        10 level 3   Prone quad stretch 30"x4 30"x4        4   Bridging x30  B/L   With  Knee  ext x30  B/L  With  Knee  ext        30   SLR 2#  3x10   3#  2x10        30   Standing HS curls D/C              SLS FADY  Foam  30"x4 Blue  Foam  30"x4        Blue foam x4   Lunges x30 x30        30   Side lying hip ABD  3#  2x10           Hamstring stretch 30"x4 30"x4        4   Stair training 5  flights 5  flights        5 flights   Leg press (Single leg) 4  Plates  S44   4plates  E89        3 plates 4S32   Apollo knee flex 80#  3x10 80#  3x10        70# x30   Apollo knee ext B/L 40#  3x10   40#  3x10        30#x30   TKE BLK  x30 BLK  x30           Lateral step downs 4"step  X 20 4"step  x20        20   Sustained squat on rockerboard 30"x4 30"x4        30"x4   sidestepping FADY  6laps FADY  6laps        Blue tb x6 laps       Modalities              PRN                                         Assessment: Performed exercise progressions w/o complaint  Fatigue cont during and after tx  Will monitor  Plan: Continue x1 visit with PT, then D/C

## 2018-08-29 ENCOUNTER — APPOINTMENT (OUTPATIENT)
Dept: PHYSICAL THERAPY | Facility: CLINIC | Age: 48
End: 2018-08-29
Payer: COMMERCIAL

## 2018-09-08 LAB
ALBUMIN SERPL-MCNC: 4.1 G/DL (ref 3.6–5.1)
ALBUMIN/GLOB SERPL: 1.5 (CALC) (ref 1–2.5)
ALP SERPL-CCNC: 45 U/L (ref 40–115)
ALT SERPL-CCNC: 28 U/L (ref 9–46)
AST SERPL-CCNC: 20 U/L (ref 10–40)
BASOPHILS # BLD AUTO: 62 CELLS/UL (ref 0–200)
BASOPHILS NFR BLD AUTO: 1.2 %
BILIRUB SERPL-MCNC: 0.5 MG/DL (ref 0.2–1.2)
BUN SERPL-MCNC: 15 MG/DL (ref 7–25)
BUN/CREAT SERPL: ABNORMAL (CALC) (ref 6–22)
CALCIUM SERPL-MCNC: 9.4 MG/DL (ref 8.6–10.3)
CHLORIDE SERPL-SCNC: 102 MMOL/L (ref 98–110)
CHOLEST SERPL-MCNC: 191 MG/DL
CHOLEST/HDLC SERPL: 4.3 (CALC)
CO2 SERPL-SCNC: 27 MMOL/L (ref 20–32)
CREAT SERPL-MCNC: 0.87 MG/DL (ref 0.6–1.35)
EOSINOPHIL # BLD AUTO: 78 CELLS/UL (ref 15–500)
EOSINOPHIL NFR BLD AUTO: 1.5 %
ERYTHROCYTE [DISTWIDTH] IN BLOOD BY AUTOMATED COUNT: 12.6 % (ref 11–15)
GLOBULIN SER CALC-MCNC: 2.7 G/DL (CALC) (ref 1.9–3.7)
GLUCOSE SERPL-MCNC: 101 MG/DL (ref 65–99)
HCT VFR BLD AUTO: 44.5 % (ref 38.5–50)
HDLC SERPL-MCNC: 44 MG/DL
HGB BLD-MCNC: 14.9 G/DL (ref 13.2–17.1)
LDLC SERPL CALC-MCNC: 128 MG/DL (CALC)
LYMPHOCYTES # BLD AUTO: 1617 CELLS/UL (ref 850–3900)
LYMPHOCYTES NFR BLD AUTO: 31.1 %
MCH RBC QN AUTO: 29.5 PG (ref 27–33)
MCHC RBC AUTO-ENTMCNC: 33.5 G/DL (ref 32–36)
MCV RBC AUTO: 88.1 FL (ref 80–100)
MONOCYTES # BLD AUTO: 432 CELLS/UL (ref 200–950)
MONOCYTES NFR BLD AUTO: 8.3 %
NEUTROPHILS # BLD AUTO: 3011 CELLS/UL (ref 1500–7800)
NEUTROPHILS NFR BLD AUTO: 57.9 %
NONHDLC SERPL-MCNC: 147 MG/DL (CALC)
PLATELET # BLD AUTO: 283 THOUSAND/UL (ref 140–400)
PMV BLD REES-ECKER: 10 FL (ref 7.5–12.5)
POTASSIUM SERPL-SCNC: 4.4 MMOL/L (ref 3.5–5.3)
PROT SERPL-MCNC: 6.8 G/DL (ref 6.1–8.1)
RBC # BLD AUTO: 5.05 MILLION/UL (ref 4.2–5.8)
SL AMB EGFR AFRICAN AMERICAN: 117 ML/MIN/1.73M2
SL AMB EGFR NON AFRICAN AMERICAN: 101 ML/MIN/1.73M2
SODIUM SERPL-SCNC: 137 MMOL/L (ref 135–146)
TRIGL SERPL-MCNC: 93 MG/DL
TSH SERPL-ACNC: 2.21 MIU/L (ref 0.4–4.5)
WBC # BLD AUTO: 5.2 THOUSAND/UL (ref 3.8–10.8)

## 2018-09-13 RX ORDER — IBUPROFEN 800 MG/1
800 TABLET ORAL EVERY 8 HOURS PRN
COMMUNITY
Start: 2018-05-08 | End: 2020-05-22 | Stop reason: ALTCHOICE

## 2018-09-14 ENCOUNTER — OFFICE VISIT (OUTPATIENT)
Dept: FAMILY MEDICINE CLINIC | Facility: CLINIC | Age: 48
End: 2018-09-14
Payer: COMMERCIAL

## 2018-09-14 VITALS
TEMPERATURE: 97.5 F | SYSTOLIC BLOOD PRESSURE: 132 MMHG | BODY MASS INDEX: 39.22 KG/M2 | DIASTOLIC BLOOD PRESSURE: 80 MMHG | HEART RATE: 68 BPM | HEIGHT: 72 IN | WEIGHT: 289.6 LBS | RESPIRATION RATE: 16 BRPM

## 2018-09-14 DIAGNOSIS — E66.9 OBESITY (BMI 30-39.9): Primary | ICD-10-CM

## 2018-09-14 DIAGNOSIS — I10 ESSENTIAL HYPERTENSION: ICD-10-CM

## 2018-09-14 PROBLEM — S76.111A RUPTURE OF RIGHT QUADRICEPS TENDON: Status: ACTIVE | Noted: 2018-05-11

## 2018-09-14 PROCEDURE — 3008F BODY MASS INDEX DOCD: CPT | Performed by: FAMILY MEDICINE

## 2018-09-14 PROCEDURE — 3079F DIAST BP 80-89 MM HG: CPT | Performed by: FAMILY MEDICINE

## 2018-09-14 PROCEDURE — 99214 OFFICE O/P EST MOD 30 MIN: CPT | Performed by: FAMILY MEDICINE

## 2018-09-14 PROCEDURE — 3075F SYST BP GE 130 - 139MM HG: CPT | Performed by: FAMILY MEDICINE

## 2018-09-14 NOTE — ASSESSMENT & PLAN NOTE
Obesity    Patient was referred to [facility] bariatric surgeons for consultation regarding gastric sleeve surgery

## 2018-09-14 NOTE — PROGRESS NOTES
FAMILY PRACTICE OFFICE VISIT       NAME: Bob Martínez  AGE: 50 y o  SEX: male       : 1970        MRN: [id number]    DATE: 2018  TIME: 8:08 AM    Assessment and Plan     Problem List Items Addressed This Visit     Hypertension     Hypertension  Patient blood pressure is stable at this time he will continue with current regimen of medications         Obesity (BMI 30-39 9) - Primary     Obesity  Patient was referred to [facility] bariatric surgeons for consultation regarding gastric sleeve surgery         Relevant Orders    Ambulatory referral to Bariatric Surgery            There are no Patient Instructions on file for this visit  Chief Complaint     Chief Complaint   Patient presents with    Follow-up     Pt is here for 6 mos f/u for blood work and BP       History of Present Illness     Patient denies any recent illness  He is still recovering from right quadriceps tendon tear that required surgery  He completed physical therapy  He is scheduled to follow up with orthopedic surgeon in 2018  I reviewed his most recent blood test results  Patient was inquired regarding bariatric surgery due to his increase in weight and his inability to exercise enough to lose weight  I had a long discussion with the patient regarding surgical treatment options        Review of Systems   Review of Systems   Constitutional: Negative  HENT: Negative  Respiratory: Negative  Cardiovascular: Negative  Gastrointestinal: Negative  Musculoskeletal:        As per HPI   Neurological: Negative  Psychiatric/Behavioral: Negative  Active Problem List     Patient Active Problem List   Diagnosis    Hypertension    Esophageal reflux    Onychomycosis    Bursitis of heel, right    Lesion of mouth    Rupture of right quadriceps tendon    Obesity (BMI 30-39  9)       Past Medical History:  Past Medical History:   Diagnosis Date    Acute meniscal tear of left knee     Carpal tunnel syndrome, unspecified upper limb        Past Surgical History:  No past surgical history on file  Family History:  Family History   Problem Relation Age of Onset    Atrial fibrillation Mother         CARDIAC PACEMAKER    Parkinsonism Mother     Bleeding Disorder Father         ACTIVE INTERNAL BLEEDING    Liver disease Father         LIVER DAMAGE       Social History:  Social History     Social History    Marital status: Single     Spouse name: N/A    Number of children: N/A    Years of education: N/A     Occupational History    Not on file  Social History Main Topics    Smoking status: Never Smoker    Smokeless tobacco: Never Used    Alcohol use No    Drug use: No    Sexual activity: Not on file     Other Topics Concern    Not on file     Social History Narrative    No narrative on file       Objective     Vitals:    09/14/18 0726   BP: 132/80   Pulse: 68   Resp: 16   Temp: 97 5 °F (36 4 °C)     Wt Readings from Last 3 Encounters:   09/14/18 131 kg (289 lb 9 6 oz)   03/13/18 128 kg (281 lb 6 4 oz)   09/06/17 112 kg (247 lb)       Physical Exam   Constitutional: No distress  HENT:   Mouth/Throat: Oropharynx is clear and moist  No oropharyngeal exudate  Tympanic membranes within normal limits bilaterally   Neck:   No carotid bruits   Cardiovascular:   Regular rate and rhythm with no murmurs   Pulmonary/Chest:   Lungs are clear to auscultation without wheezes,rales, or rhonchi   Abdominal:   Abdomen is soft, nontender with positive bowel sounds  There is no rebound or guarding  No masses palpated   Musculoskeletal: He exhibits no edema  Lymphadenopathy:     He has no cervical adenopathy  Psychiatric: He has a normal mood and affect   His behavior is normal  Judgment and thought content normal        Pertinent Laboratory/Diagnostic Studies:  Lab Results   Component Value Date    GLUCOSE 84 10/17/2013    BUN 15 09/07/2018    CREATININE 0 87 09/07/2018    CALCIUM 9 4 09/07/2018    NA 139 02/18/2017    K 4 3 02/18/2017    CO2 27 09/07/2018     09/07/2018     Lab Results   Component Value Date    ALT 20 02/18/2017    AST 19 02/18/2017    ALKPHOS 45 09/07/2018    BILITOT 0 6 02/18/2017       Lab Results   Component Value Date    WBC 5 2 09/07/2018    HGB 14 9 09/07/2018    HCT 44 5 09/07/2018    MCV 88 1 09/07/2018     09/07/2018       Lab Results   Component Value Date    TSH 2 21 09/07/2018       Lab Results   Component Value Date    CHOL 175 10/17/2013     Lab Results   Component Value Date    TRIG 93 09/07/2018     Lab Results   Component Value Date    HDL 44 09/07/2018     Lab Results   Component Value Date    LDLCALC 111 10/17/2013     No results found for: HGBA1C    Results for orders placed or performed in visit on 09/07/18   Lipid panel   Result Value Ref Range    Total Cholesterol 191 <200 mg/dL    HDL 44 >40 mg/dL    Triglycerides 93 <150 mg/dL    LDL Direct 128 (H) mg/dL (calc)    Chol HDLC Ratio 4 3 <5 0 (calc)    Non-HDL Cholesterol 147 (H) <130 mg/dL (calc)   Comprehensive metabolic panel   Result Value Ref Range    Glucose 101 (H) 65 - 99 mg/dL    BUN 15 7 - 25 mg/dL    Creatinine 0 87 0 60 - 1 35 mg/dL    eGFR Non  101 > OR = 60 mL/min/1 73m2    SL AMB EGFR  117 > OR = 60 mL/min/1 73m2    SL AMB BUN/CREATININE RATIO NOT APPLICABLE 6 - 22 (calc)    Sodium 137 135 - 146 mmol/L    SL AMB POTASSIUM 4 4 3 5 - 5 3 mmol/L    Chloride 102 98 - 110 mmol/L    CO2 27 20 - 32 mmol/L    SL AMB CALCIUM 9 4 8 6 - 10 3 mg/dL    SL AMB PROTEIN, TOTAL 6 8 6 1 - 8 1 g/dL    Albumin 4 1 3 6 - 5 1 g/dL    Globulin 2 7 1 9 - 3 7 g/dL (calc)    SL AMB ALBUMIN/GLOBULIN RATIO 1 5 1 0 - 2 5 (calc)    TOTAL BILIRUBIN 0 5 0 2 - 1 2 mg/dL    Alkaline Phosphatase 45 40 - 115 U/L    SL AMB AST 20 10 - 40 U/L    SL AMB ALT 28 9 - 46 U/L   CBC and differential   Result Value Ref Range    White Blood Cell Count 5 2 3 8 - 10 8 Thousand/uL    Red Blood Cell Count 5 05 4 20 - 5 80 Million/uL    Hemoglobin 14 9 13 2 - 17 1 g/dL    HCT 44 5 38 5 - 50 0 %    MCV 88 1 80 0 - 100 0 fL    MCH 29 5 27 0 - 33 0 pg    MCHC 33 5 32 0 - 36 0 g/dL    RDW 12 6 11 0 - 15 0 %    Platelet Count 414 802 - 400 Thousand/uL    SL AMB MPV 10 0 7 5 - 12 5 fL    Neutrophils (Absolute) 3,011 1,500 - 7,800 cells/uL    Lymphocytes (Absolute) 1,617 850 - 3,900 cells/uL    Monocytes (Absolute) 432 200 - 950 cells/uL    Eosinophils (Absolute) 78 15 - 500 cells/uL    Basophils (Absolute) 62 0 - 200 cells/uL    Neutrophils 57 9 %    Lymphocytes 31 1 %    Monocytes 8 3 %    Eosinophils 1 5 %    Basophils Relative 1 2 %   TSH, 3rd generation   Result Value Ref Range    TSH 2 21 0 40 - 4 50 mIU/L       Orders Placed This Encounter   Procedures    Ambulatory referral to Bariatric Surgery       ALLERGIES:  No Known Allergies    Current Medications     Current Outpatient Prescriptions   Medication Sig Dispense Refill    GLUCOSAMINE-CHONDROIT-CALCIUM PO Take by mouth      ibuprofen (MOTRIN) 800 mg tablet Take 800 mg by mouth every 8 (eight) hours      losartan-hydrochlorothiazide (HYZAAR) 50-12 5 mg per tablet Take 1 tablet by mouth daily       No current facility-administered medications for this visit            Health Maintenance     Health Maintenance   Topic Date Due    PT PLAN OF CARE  08/23/2018    INFLUENZA VACCINE  09/01/2018    DTaP,Tdap,and Td Vaccines (1 - Tdap) 09/13/2019 (Originally 12/11/1989)    Depression Screening PHQ  07/02/2019     Immunization History   Administered Date(s) Administered    Influenza TIV (IM) 32/89/2982       Luann Gaytan MD

## 2018-09-19 ENCOUNTER — TELEPHONE (OUTPATIENT)
Dept: FAMILY MEDICINE CLINIC | Facility: CLINIC | Age: 48
End: 2018-09-19

## 2018-09-19 DIAGNOSIS — E78.5 HYPERLIPIDEMIA, UNSPECIFIED HYPERLIPIDEMIA TYPE: Primary | ICD-10-CM

## 2018-09-19 RX ORDER — SIMVASTATIN 5 MG
5 TABLET ORAL
Qty: 90 TABLET | Refills: 1 | Status: SHIPPED | OUTPATIENT
Start: 2018-09-19 | End: 2019-03-14

## 2018-09-19 NOTE — TELEPHONE ENCOUNTER
His cholesterol numbers are not that bad but we could certainly start a low dose statin therapy if he feels this would help him qualify for bariatric surgery     If he is agreeable I will send to the pharmacy and he may have repeat blood work in 6-8 weeks

## 2018-09-19 NOTE — TELEPHONE ENCOUNTER
Patient is aware of MD instructions  Patient is agreeable to starting the medication  Please print the script for blood work

## 2018-09-19 NOTE — TELEPHONE ENCOUNTER
Patient called [facility] and they stated he doesn't qualify for Weight Loss Surgery  He was wondering if his cholesterol is high enough to prescribe medication for him  If he is prescribed medication and may qualify  Patient does uses [facility] in [place]  Please contact patient at [phone]

## 2018-11-05 ENCOUNTER — CLINICAL SUPPORT (OUTPATIENT)
Dept: BARIATRICS | Facility: CLINIC | Age: 48
End: 2018-11-05

## 2018-11-05 VITALS
RESPIRATION RATE: 18 BRPM | BODY MASS INDEX: 39.62 KG/M2 | HEIGHT: 72 IN | SYSTOLIC BLOOD PRESSURE: 116 MMHG | WEIGHT: 292.5 LBS | TEMPERATURE: 98 F | DIASTOLIC BLOOD PRESSURE: 72 MMHG | HEART RATE: 82 BPM

## 2018-11-05 DIAGNOSIS — E66.9 OBESITY, CLASS II, BMI 35-39.9: Primary | ICD-10-CM

## 2018-11-05 DIAGNOSIS — E66.01 MORBID OBESITY (HCC): Primary | ICD-10-CM

## 2018-11-05 DIAGNOSIS — E66.9 OBESITY (BMI 30-39.9): ICD-10-CM

## 2018-11-05 PROCEDURE — RECHECK: Performed by: DIETITIAN, REGISTERED

## 2018-11-05 NOTE — PROGRESS NOTES
Bariatric Behavioral Health Evaluation    Presenting Problem: Patient reports he has been unsuccessful with previous weight loss  Is the patient seeking Bariatric Surgery Eval? Yes  If yes how long have you researched this surgery option  Patient has considered bariatric surgery since 2017  Realizes Post- Op Requirements? Yes, patient has friends with bariatric surgery  Psychiatric/Psychological Treatment Diagnosis: None reported     Outpatient Counselor No     Psychiatrist No     Have you had Inpatient Treatment? No    Family Constellation (include relationship with each and Psych/Med HX) None reported      Mother is 67years old, father [de-identified] 77years old, 1 sister  Domestic Violence  None reported     Abuse History:   None reported     Additional comments/stressors related to family/relationships/peer support: None reported     Physical/Psychological Assessment:     Appearance: appropriate  Sociability: average  Affect: appropriate  Mood: calm  Thought Process: coherent  Speech: normal  Content: no impairment  Orientation: person  Yes   Insight: emotional  good    Risk Assessment:     none    Recommendations: Recommended for surgery  yes    Risk of Harm to Self or Others: None reported     Access to weapons no     Based on the previous information, the client presents the following risk of harm to self or others: low     Note   Completed Behavioral Health Assessment  Provided patient education as needed  Patient denies to  Axis 1 diagnosis  Patient  meets criteria for [address] [place] [address] bariatric  surgery program and is therefore referred to surgeon      BARIATRIC SURGERY EDUCATION CHECKLIST    I have received education related to my bariatric surgery process and understand:    Patients may be required to complete a psychiatric evaluation and receive clearance for surgery from their psychiatrist     Patients who undergo weight loss surgery are at higher risk of increased mental health concerns and suicide attempts  Patients may be required to complete a full substance abuse evaluation and then complete all treatment recommendations prior to surgery  If diagnosis of abuse/dependence results, patient may be required to remain sober for one (1) year before having bariatric surgery  Patients on psychiatric medications should check with their provider to discuss psychiatric medications and the changes in absorption  Patient should discuss all time release medications with provider and take all medications as prescribed  The recommendation is that there is no use of  any tobacco products, Hookah or  vapes for the bariatric post-operation patient  Bariatric surgery patients should not consume alcohol as a post-operative patient as it may increase risk of numerous health conditions including but not limited to alcohol abuse and ulcers  There is a possibility of weight regain if patient does not follow all program guidelines and recommendations  Bariatric surgery patients should exercise thirty (30) to sixty (60) minutes per day to maintain post-surgical weight loss  Research indicates that bariatric patients are more successful when they see a therapist for up to two (2) years post-op  Patients will follow all medical and dietary recommendations provided  Patient will keep all scheduled appointments and follow up with their physician for a minimum of five (5) years  Patient will take all vitamins as recommended  Post-operative vitamins are life-long  Patient reviewed Bariatric Surgery Education Checklist and agrees they have received education on these issues

## 2018-11-05 NOTE — PROGRESS NOTES
Bariatric Nutrition Assessment Note    Type of surgery    Preop  Surgery Date: TBD  Surgeon: Dr Steve Portillo  50 y o   male     Wt with BMI of 25: 184 6lbs  Pre-Op Excess Wt: 107 9lbs  Ht 6' (1 829 m)   Wt 133 kg (292 lb 8 oz)   BMI 39 67 kg/m²     Weight History   Onset of Obesity: Childhood  Family history of obesity: No  Wt Loss Attempts: Exercise  Maximum Wt Lost: 71lbs with exercise    Review of History and Medications   Past Medical History:   Diagnosis Date    Acute meniscal tear of left knee     Carpal tunnel syndrome, unspecified upper limb      No past surgical history on file    Social History     Social History    Marital status: Single     Spouse name: N/A    Number of children: N/A    Years of education: N/A     Social History Main Topics    Smoking status: Never Smoker    Smokeless tobacco: Never Used    Alcohol use No    Drug use: No    Sexual activity: Not on file     Other Topics Concern    Not on file     Social History Narrative    No narrative on file       Current Outpatient Prescriptions:     GLUCOSAMINE-CHONDROIT-CALCIUM PO, Take by mouth, Disp: , Rfl:     ibuprofen (MOTRIN) 800 mg tablet, Take 800 mg by mouth every 8 (eight) hours, Disp: , Rfl:     losartan-hydrochlorothiazide (HYZAAR) 50-12 5 mg per tablet, Take 1 tablet by mouth daily, Disp: , Rfl:     simvastatin (ZOCOR) 5 MG tablet, Take 1 tablet (5 mg total) by mouth daily at bedtime, Disp: 90 tablet, Rfl: 1  Food Intake and Lifestyle Assessment   Food Intake Assessment completed via 24 hour recall  Breakfast: [employer]: jackson egg n cheese bagel, hash brown, diet coke  Snack: none   Lunch: 1 slice cheeseburger pizza  Snack: none  Dinner: [employer]:  Homestyle chicken- 2 slices, two order hashbrown casserole, unsweetened iced tea  Snack: sometimes ice cream at night  Beverage intake: water, sugar free beverages and coffee/tea  Protein supplement: none  Estimated protein intake per day: 30-60g  Estimated fluid intake per day: 32 oz tea, 20 oz water  Meals eaten away from home: lunches at work cafeteria, goes out to eat Friday and Saturday for dinner, Saturday breakfast at [address] [place] [address]  Typical meal pattern: 3 meals per day and 1 snacks per day  Eating Behaviors: Consumption of high calorie/ high fat foods and Mindless eating  Food allergies or intolerances: No Known Allergies  Cultural or Yazidi considerations: dislikes seafood, dislikes most vegetables    Physical Assessment  Physical Activity  Types of exercise: Biking once weekly, bowls once weekly  Current physical limitations: recent quadriceps tear    Psychosocial Assessment   Support systems: friend(s)  Lives with his mother, who does not want him to have surgery  Socioeconomic factors: none    Nutrition Diagnosis  Diagnosis: Overweight / Obesity (NC-3 3)  Related to: Food and nutrition-related knowledge deficit, Physical inactivity and Excessive energy intake  As Evidenced by: BMI >25 and Excessive energy intake     Nutrition Prescription: Recommend the following diet  Low fat, Low sugar, High protein and Regular    Interventions and Teaching   Discussed pre-op and post-op nutrition guidelines  Patient educated and handouts provided    Surgical changes to stomach / GI  Capacity of post-surgery stomach  Diet progression  Adequate hydration  Sugar and fat restriction to decrease "dumping syndrome"  Fat restriction to decrease steatorrhea  Expected weight loss  Exercise  Suggestions for pre-op diet  Nutrition considerations after surgery  Protein supplements  Meal planning and preparation  Appropriate carbohydrate, protein, and fat intake, and food/fluid choices to maximize safe weight loss, nutrient intake, and tolerance   Dietary and lifestyle changes  Possible problems with poor eating habits  Techniques for self monitoring and keeping daily food journal  Potential for food intolerance after surgery, and ways to deal with them including: lactose intolerance, nausea, reflux, vomiting, diarrhea, food intolerance, appetite changes, gas  Vitamin / Mineral supplementation of Multivitamin with minerals and Vitamin D    Education provided to: patient    Barriers to learning: No barriers identified  Readiness to change: preparation    Prior research on procedure: discussed with provider and pre-op class    Comprehension: needs reinforcement and verbalizes understanding     Expected Compliance: good  Recommendations  Pt is an appropriate candidate for surgery  Yes    Evaluation / Monitoring  Dietitian to Monitor: Eating pattern as discussed Tolerance of nutrition prescription Body weight Lab values Physical activity    Goals  Eliminate sugar sweetened beverages, Food journal, Exercise 30 minutes 5 times per week, Complete lession plans 1-6, Eat 3 meals per day, Eliminate mindless snacking and eat at least one fruit and one vegetable daily      Time Spent:   1 Hour

## 2018-11-06 DIAGNOSIS — I10 ESSENTIAL HYPERTENSION: Primary | ICD-10-CM

## 2018-11-06 RX ORDER — LOSARTAN POTASSIUM AND HYDROCHLOROTHIAZIDE 12.5; 5 MG/1; MG/1
TABLET ORAL
Qty: 90 TABLET | Refills: 3 | Status: SHIPPED | OUTPATIENT
Start: 2018-11-06 | End: 2019-11-11 | Stop reason: SDUPTHER

## 2019-03-06 ENCOUNTER — TELEPHONE (OUTPATIENT)
Dept: FAMILY MEDICINE CLINIC | Facility: CLINIC | Age: 49
End: 2019-03-06

## 2019-03-06 NOTE — TELEPHONE ENCOUNTER
Patient states he had seen a Dr for his Fungus on toe nail, but it's been a while since he has seen her  He wants to know if Dr Samir Padilla will give a script to have bloodwork done for his liver before he comes in for his appointment with him on 3/14/19 so they can discuss it? Please call & leave a message on his voicemail

## 2019-03-07 DIAGNOSIS — I10 ESSENTIAL HYPERTENSION: Primary | ICD-10-CM

## 2019-03-11 LAB
ALBUMIN SERPL-MCNC: 4.1 G/DL (ref 3.6–5.1)
ALBUMIN/GLOB SERPL: 1.6 (CALC) (ref 1–2.5)
ALP SERPL-CCNC: 53 U/L (ref 40–115)
ALT SERPL-CCNC: 36 U/L (ref 9–46)
AST SERPL-CCNC: 30 U/L (ref 10–35)
BILIRUB SERPL-MCNC: 0.8 MG/DL (ref 0.2–1.2)
BUN SERPL-MCNC: 18 MG/DL (ref 7–25)
BUN/CREAT SERPL: ABNORMAL (CALC) (ref 6–22)
CALCIUM SERPL-MCNC: 9.2 MG/DL (ref 8.6–10.3)
CHLORIDE SERPL-SCNC: 102 MMOL/L (ref 98–110)
CHOLEST SERPL-MCNC: 188 MG/DL
CHOLEST/HDLC SERPL: 3.9 (CALC)
CO2 SERPL-SCNC: 31 MMOL/L (ref 20–32)
CREAT SERPL-MCNC: 0.93 MG/DL (ref 0.7–1.33)
GLOBULIN SER CALC-MCNC: 2.6 G/DL (CALC) (ref 1.9–3.7)
GLUCOSE SERPL-MCNC: 118 MG/DL (ref 65–99)
HDLC SERPL-MCNC: 48 MG/DL
LDLC SERPL CALC-MCNC: 116 MG/DL (CALC)
NONHDLC SERPL-MCNC: 140 MG/DL (CALC)
POTASSIUM SERPL-SCNC: 4 MMOL/L (ref 3.5–5.3)
PROT SERPL-MCNC: 6.7 G/DL (ref 6.1–8.1)
SL AMB EGFR AFRICAN AMERICAN: 111 ML/MIN/1.73M2
SL AMB EGFR NON AFRICAN AMERICAN: 95 ML/MIN/1.73M2
SODIUM SERPL-SCNC: 138 MMOL/L (ref 135–146)
TRIGL SERPL-MCNC: 125 MG/DL

## 2019-03-13 RX ORDER — TERBINAFINE HYDROCHLORIDE 250 MG/1
TABLET ORAL
Refills: 2 | COMMUNITY
Start: 2018-12-28 | End: 2019-09-17 | Stop reason: ALTCHOICE

## 2019-03-14 ENCOUNTER — OFFICE VISIT (OUTPATIENT)
Dept: FAMILY MEDICINE CLINIC | Facility: CLINIC | Age: 49
End: 2019-03-14
Payer: COMMERCIAL

## 2019-03-14 VITALS
HEART RATE: 80 BPM | TEMPERATURE: 99.2 F | RESPIRATION RATE: 20 BRPM | SYSTOLIC BLOOD PRESSURE: 118 MMHG | DIASTOLIC BLOOD PRESSURE: 72 MMHG

## 2019-03-14 DIAGNOSIS — I10 ESSENTIAL HYPERTENSION: ICD-10-CM

## 2019-03-14 DIAGNOSIS — R35.1 NOCTURIA: ICD-10-CM

## 2019-03-14 DIAGNOSIS — Z12.11 SCREENING FOR COLON CANCER: Primary | ICD-10-CM

## 2019-03-14 DIAGNOSIS — E66.9 OBESITY (BMI 30-39.9): ICD-10-CM

## 2019-03-14 PROCEDURE — 3078F DIAST BP <80 MM HG: CPT | Performed by: FAMILY MEDICINE

## 2019-03-14 PROCEDURE — 3074F SYST BP LT 130 MM HG: CPT | Performed by: FAMILY MEDICINE

## 2019-03-14 PROCEDURE — 1036F TOBACCO NON-USER: CPT | Performed by: FAMILY MEDICINE

## 2019-03-14 PROCEDURE — 99213 OFFICE O/P EST LOW 20 MIN: CPT | Performed by: FAMILY MEDICINE

## 2019-03-14 NOTE — ASSESSMENT & PLAN NOTE
Obesity  Patient given diet information for low-cholesterol low-fat dieting    He will try to increase his exercise regimen with bicycling and walking

## 2019-03-14 NOTE — PROGRESS NOTES
FAMILY PRACTICE OFFICE VISIT       NAME: Bob Martníez  AGE: 46 y o  SEX: male       : 1970        MRN: [id number]    DATE: 3/14/2019  TIME: 5:29 PM    Assessment and Plan     Problem List Items Addressed This Visit        Cardiovascular and Mediastinum    Hypertension     Hypertension  Patient blood pressure is stable at this time she will continue with current regimen of medications         Relevant Orders    TSH, 3rd generation    Lipid panel    Comprehensive metabolic panel    CBC       Other    Obesity (BMI 30-39  9)     Obesity  Patient given diet information for low-cholesterol low-fat dieting  He will try to increase his exercise regimen with bicycling and walking           Other Visit Diagnoses     Screening for colon cancer    -  Primary    Relevant Orders    Cologuard    Nocturia        Relevant Orders    PSA, Total Screen            There are no Patient Instructions on file for this visit  Chief Complaint     Chief Complaint   Patient presents with    Follow-up       History of Present Illness     Patient denies any recent illness  He has gained a a fair amount of weight since his hamstring muscle injury which prevented him to be more active  He has been given the approval to start using an exercise bicycle and walking to try and improve his weight  I reviewed his most recent blood test results  Patient decided against going forward with bariatric surgery      Review of Systems   Review of Systems   Constitutional: Negative  HENT: Negative  Respiratory: Negative  Cardiovascular: Negative  Gastrointestinal: Negative  Genitourinary: Negative  Musculoskeletal:        As per HPI   Neurological: Negative  Psychiatric/Behavioral: Negative          Active Problem List     Patient Active Problem List   Diagnosis    Hypertension    Esophageal reflux    Onychomycosis    Bursitis of heel, right    Lesion of mouth    Rupture of right quadriceps tendon    Obesity (BMI 30-39  9)       Past Medical History:  Past Medical History:   Diagnosis Date    Acute meniscal tear of left knee     Arthritis     Carpal tunnel syndrome, unspecified upper limb     Hypercholesterolemia     Hypertension     Morbid obesity ([address] )        Past Surgical History:  Past Surgical History:   Procedure Laterality Date    ARTHROSCOPY KNEE      QUADRICEPS REPAIR         Family History:  Family History   Problem Relation Age of Onset    Atrial fibrillation Mother         CARDIAC PACEMAKER    Parkinsonism Mother     Bleeding Disorder Father         ACTIVE INTERNAL BLEEDING    Liver disease Father         LIVER DAMAGE       Social History:  Social History     Socioeconomic History    Marital status: Single     Spouse name: Not on file    Number of children: Not on file    Years of education: Not on file    Highest education level: Not on file   Occupational History    Not on file   Social Needs    Financial resource strain: Not on file    Food insecurity:     Worry: Not on file     Inability: Not on file    Transportation needs:     Medical: Not on file     Non-medical: Not on file   Tobacco Use    Smoking status: Never Smoker    Smokeless tobacco: Never Used   Substance and Sexual Activity    Alcohol use: No    Drug use: No    Sexual activity: Not on file   Lifestyle    Physical activity:     Days per week: Not on file     Minutes per session: Not on file    Stress: Not on file   Relationships    Social connections:     Talks on phone: Not on file     Gets together: Not on file     Attends Baptism service: Not on file     Active member of club or organization: Not on file     Attends meetings of clubs or organizations: Not on file     Relationship status: Not on file    Intimate partner violence:     Fear of current or ex partner: Not on file     Emotionally abused: Not on file     Physically abused: Not on file     Forced sexual activity: Not on file   Other Topics Concern    Not on file   Social History Narrative    Not on file       Objective     Vitals:    03/14/19 1649   BP: 118/72   Pulse:    Resp:    Temp:      Wt Readings from Last 3 Encounters:   11/05/18 133 kg (292 lb 8 oz)   09/14/18 131 kg (289 lb 9 6 oz)   03/13/18 128 kg (281 lb 6 4 oz)       Physical Exam   Constitutional: He is oriented to person, place, and time  No distress  Cardiovascular: Normal heart sounds  Regular rate and rhythm with no murmurs   Pulmonary/Chest: Breath sounds normal    Lungs are clear to auscultation without wheezes,rales, or rhonchi   Abdominal:   Abdomen is soft, nontender with positive bowel sounds  There is no rebound or guarding  No masses palpated   Musculoskeletal: He exhibits no edema  Neurological: He is alert and oriented to person, place, and time  Psychiatric: He has a normal mood and affect   His behavior is normal  Judgment and thought content normal        Pertinent Laboratory/Diagnostic Studies:  Lab Results   Component Value Date    GLUCOSE 84 10/17/2013    BUN 18 03/11/2019    CREATININE 0 93 02/18/2017    CALCIUM 9 2 03/11/2019     02/18/2017    K 4 0 03/11/2019    CO2 31 03/11/2019     03/11/2019     Lab Results   Component Value Date    ALT 36 03/11/2019    AST 30 03/11/2019    ALKPHOS 53 03/11/2019    BILITOT 0 6 02/18/2017       Lab Results   Component Value Date    WBC 5 2 09/07/2018    HGB 14 9 09/07/2018    HCT 44 5 09/07/2018    MCV 88 1 09/07/2018     09/07/2018       Lab Results   Component Value Date    TSH 2 21 09/07/2018       Lab Results   Component Value Date    CHOL 175 10/17/2013     Lab Results   Component Value Date    TRIG 125 03/11/2019     Lab Results   Component Value Date    HDL 48 03/11/2019     Lab Results   Component Value Date    LDLCALC 111 10/17/2013     No results found for: HGBA1C    Results for orders placed or performed in visit on 03/11/19   Lipid panel   Result Value Ref Range    Total Cholesterol 188 <200 mg/dL HDL 48 >40 mg/dL    Triglycerides 125 <150 mg/dL    LDL Direct 116 (H) mg/dL (calc)    Chol HDLC Ratio 3 9 <5 0 (calc)    Non-HDL Cholesterol 140 (H) <130 mg/dL (calc)   Comprehensive metabolic panel   Result Value Ref Range    Glucose, Random 118 (H) 65 - 99 mg/dL    BUN 18 7 - 25 mg/dL    Creatinine 0 93 0 70 - 1 33 mg/dL    eGFR Non  95 > OR = 60 mL/min/1 73m2    eGFR  111 > OR = 60 mL/min/1 73m2    SL AMB BUN/CREATININE RATIO NOT APPLICABLE 6 - 22 (calc)    Sodium 138 135 - 146 mmol/L    Potassium 4 0 3 5 - 5 3 mmol/L    Chloride 102 98 - 110 mmol/L    CO2 31 20 - 32 mmol/L    SL AMB CALCIUM 9 2 8 6 - 10 3 mg/dL    Protein, Total 6 7 6 1 - 8 1 g/dL    Albumin 4 1 3 6 - 5 1 g/dL    Globulin 2 6 1 9 - 3 7 g/dL (calc)    Albumin/Globulin Ratio 1 6 1 0 - 2 5 (calc)    TOTAL BILIRUBIN 0 8 0 2 - 1 2 mg/dL    Alkaline Phosphatase 53 40 - 115 U/L    AST 30 10 - 35 U/L    ALT 36 9 - 46 U/L       Orders Placed This Encounter   Procedures    Cologuard    PSA, Total Screen    TSH, 3rd generation    Lipid panel    Comprehensive metabolic panel    CBC       ALLERGIES:  No Known Allergies    Current Medications     Current Outpatient Medications   Medication Sig Dispense Refill    GLUCOSAMINE-CHONDROIT-CALCIUM PO Take by mouth      ibuprofen (MOTRIN) 800 mg tablet Take 800 mg by mouth every 8 (eight) hours as needed       losartan-hydrochlorothiazide (HYZAAR) 50-12 5 mg per tablet TAKE ONE TABLET BY MOUTH ONE TIME DAILY 90 tablet 3    terbinafine (LamISIL) 250 mg tablet TAKE 1 TABLET BY MOUTH DAILY X 30 DAYS  2     No current facility-administered medications for this visit            Health Maintenance     Health Maintenance   Topic Date Due    CRC Screening: Colonoscopy  1970    BMI: Followup Plan  12/11/1986    DTaP,Tdap,and Td Vaccines (1 - Tdap) 09/13/2019 (Originally 12/11/1989)    Depression Screening PHQ  07/02/2019    BMI: Adult  11/05/2019    INFLUENZA VACCINE Completed    HEPATITIS B VACCINES  Aged Out     Immunization History   Administered Date(s) Administered     Influenza (IM) Preservative Free 10/01/2018    INFLUENZA 10/23/2013    Influenza TIV (IM) 47/73/9839       Jose Maria Sims MD

## 2019-03-28 ENCOUNTER — OFFICE VISIT (OUTPATIENT)
Dept: FAMILY MEDICINE CLINIC | Facility: CLINIC | Age: 49
End: 2019-03-28
Payer: COMMERCIAL

## 2019-03-28 VITALS
TEMPERATURE: 98.8 F | SYSTOLIC BLOOD PRESSURE: 112 MMHG | HEIGHT: 72 IN | BODY MASS INDEX: 38.22 KG/M2 | WEIGHT: 282.2 LBS | RESPIRATION RATE: 16 BRPM | DIASTOLIC BLOOD PRESSURE: 78 MMHG | HEART RATE: 76 BPM

## 2019-03-28 DIAGNOSIS — J06.9 VIRAL UPPER RESPIRATORY TRACT INFECTION: Primary | ICD-10-CM

## 2019-03-28 PROCEDURE — 99213 OFFICE O/P EST LOW 20 MIN: CPT | Performed by: NURSE PRACTITIONER

## 2019-03-28 PROCEDURE — 3008F BODY MASS INDEX DOCD: CPT | Performed by: NURSE PRACTITIONER

## 2019-03-28 NOTE — PROGRESS NOTES
FAMILY PRACTICE OFFICE VISIT       NAME: Bob Martínez  AGE: 46 y o  SEX: male       : 1970        MRN: [id number]    DATE: 3/26/2019    Assessment and Plan     Problem List Items Addressed This Visit     None      Visit Diagnoses     Viral upper respiratory tract infection    -  Primary          1  Viral upper respiratory tract infection       This 25-year-old male presents today with symptoms consistent with the viral upper respiratory infection  Recommend continuing supportive care:  Rest, increase fluids, warm tea, honey, humidification  May use Tylenol or ibuprofen as needed  Mucinex as needed  Reviewed usual course of viral URI  If symptoms should worsen, if symptoms are not improving over the next few days, instructed to call  Chief Complaint     Chief Complaint   Patient presents with    Cold Like Symptoms     x's 4+ days    Nasal Congestion    Earache    Cough    Generalized Body Aches       History of Present Illness     Yoni POLLOCK Vivian Perez is a 25-year-old male presenting today with cold symptoms that began 4-5 days ago  Symptoms include sore throat, cough, body aches, chills, sweats, bilateral ear pain, and fatigue  He has felt feverish, but has not taken his temperature  Denies chest tightness or shortness of breath  His chest does hurt from coughing so much  His sister has been sick with similar symptoms  Symptoms were worst last night  He is feels a little bit better this morning  Review of Systems   Review of Systems   Constitutional: Positive for chills, diaphoresis, fatigue and fever  HENT: Positive for congestion, postnasal drip, rhinorrhea, sinus pressure and sore throat  Respiratory: Positive for cough  Negative for chest tightness, shortness of breath and wheezing  Cardiovascular: Positive for chest pain (Rib pain from coughing)  Negative for palpitations and leg swelling  Gastrointestinal: Negative      Musculoskeletal: Positive for myalgias  Neurological: Negative for dizziness and headaches  Active Problem List     Patient Active Problem List   Diagnosis    Hypertension    Esophageal reflux    Onychomycosis    Bursitis of heel, right    Lesion of mouth    Rupture of right quadriceps tendon    Obesity (BMI 30-39  9)       Past Medical History:  Past Medical History:   Diagnosis Date    Acute meniscal tear of left knee     Arthritis     Carpal tunnel syndrome, unspecified upper limb     Hypercholesterolemia     Hypertension     Morbid obesity ([address] )        Past Surgical History:  Past Surgical History:   Procedure Laterality Date    ARTHROSCOPY KNEE      QUADRICEPS REPAIR         Family History:  Family History   Problem Relation Age of Onset    Atrial fibrillation Mother         CARDIAC PACEMAKER    Parkinsonism Mother     Bleeding Disorder Father         ACTIVE INTERNAL BLEEDING    Liver disease Father         LIVER DAMAGE       Social History:  Social History     Socioeconomic History    Marital status: Single     Spouse name: Not on file    Number of children: Not on file    Years of education: Not on file    Highest education level: Not on file   Occupational History    Not on file   Social Needs    Financial resource strain: Not on file    Food insecurity:     Worry: Not on file     Inability: Not on file    Transportation needs:     Medical: Not on file     Non-medical: Not on file   Tobacco Use    Smoking status: Never Smoker    Smokeless tobacco: Never Used   Substance and Sexual Activity    Alcohol use: No    Drug use: No    Sexual activity: Not on file   Lifestyle    Physical activity:     Days per week: Not on file     Minutes per session: Not on file    Stress: Not on file   Relationships    Social connections:     Talks on phone: Not on file     Gets together: Not on file     Attends Evangelical service: Not on file     Active member of club or organization: Not on file     Attends meetings of clubs or organizations: Not on file     Relationship status: Not on file    Intimate partner violence:     Fear of current or ex partner: Not on file     Emotionally abused: Not on file     Physically abused: Not on file     Forced sexual activity: Not on file   Other Topics Concern    Not on file   Social History Narrative    Not on file       I have reviewed the patient's medical history in detail; there are no changes to the history as noted in the electronic medical record  Objective     Vitals:    03/28/19 1108   BP: 112/78   Pulse: 76   Resp: 16   Temp: 98 8 °F (37 1 °C)   TempSrc: Tympanic   Weight: 128 kg (282 lb 3 2 oz)   Height: 6' (1 829 m)     Wt Readings from Last 3 Encounters:   03/28/19 128 kg (282 lb 3 2 oz)   11/05/18 133 kg (292 lb 8 oz)   09/14/18 131 kg (289 lb 9 6 oz)     Body mass index is 38 27 kg/m²  PHQ-9 Depression Screening    PHQ-9:    Frequency of the following problems over the past two weeks:            Physical Exam   Constitutional: He appears well-developed and well-nourished  He does not appear ill  No distress  HENT:   Head: Normocephalic and atraumatic  Right Ear: Tympanic membrane and ear canal normal    Left Ear: Tympanic membrane and ear canal normal    Nose: Mucosal edema and rhinorrhea present  Mouth/Throat: Posterior oropharyngeal erythema present  No oropharyngeal exudate or posterior oropharyngeal edema  Eyes: Conjunctivae are normal    Neck: Normal range of motion  Neck supple  Cardiovascular: Normal rate, regular rhythm and normal heart sounds  No murmur heard  Pulmonary/Chest: Effort normal and breath sounds normal    Musculoskeletal: He exhibits no edema  Lymphadenopathy:     He has cervical adenopathy (Bilateral submandibular adenopathy)  Psychiatric: He has a normal mood and affect  Nursing note and vitals reviewed        ALLERGIES:  No Known Allergies    Current Medications     Current Outpatient Medications   Medication Sig Dispense Refill    GLUCOSAMINE-CHONDROIT-CALCIUM PO Take by mouth      ibuprofen (MOTRIN) 800 mg tablet Take 800 mg by mouth every 8 (eight) hours as needed       losartan-hydrochlorothiazide (HYZAAR) 50-12 5 mg per tablet TAKE ONE TABLET BY MOUTH ONE TIME DAILY 90 tablet 3    terbinafine (LamISIL) 250 mg tablet TAKE 1 TABLET BY MOUTH DAILY X 30 DAYS  2     No current facility-administered medications for this visit            Health Maintenance     Health Maintenance   Topic Date Due    CRC Screening: Colonoscopy  1970    BMI: Followup Plan  12/11/1986    DTaP,Tdap,and Td Vaccines (1 - Tdap) 09/13/2019 (Originally 12/11/1989)    Depression Screening PHQ  07/02/2019    BMI: Adult  03/28/2020    INFLUENZA VACCINE  Completed    HEPATITIS B VACCINES  Aged Out     Immunization History   Administered Date(s) Administered     Influenza (IM) Preservative Free 10/01/2018    INFLUENZA 10/23/2013    Influenza TIV (IM) 10/04/2016       WOJCIECH Lee

## 2019-04-25 ENCOUNTER — TELEPHONE (OUTPATIENT)
Dept: FAMILY MEDICINE CLINIC | Facility: CLINIC | Age: 49
End: 2019-04-25

## 2019-09-15 LAB
ALBUMIN SERPL-MCNC: 4.2 G/DL (ref 3.6–5.1)
ALBUMIN/GLOB SERPL: 1.9 (CALC) (ref 1–2.5)
ALP SERPL-CCNC: 54 U/L (ref 40–115)
ALT SERPL-CCNC: 15 U/L (ref 9–46)
AST SERPL-CCNC: 14 U/L (ref 10–35)
BASOPHILS # BLD AUTO: 52 CELLS/UL (ref 0–200)
BASOPHILS NFR BLD AUTO: 0.8 %
BILIRUB SERPL-MCNC: 1 MG/DL (ref 0.2–1.2)
BUN SERPL-MCNC: 16 MG/DL (ref 7–25)
BUN/CREAT SERPL: NORMAL (CALC) (ref 6–22)
CALCIUM SERPL-MCNC: 9.3 MG/DL (ref 8.6–10.3)
CHLORIDE SERPL-SCNC: 102 MMOL/L (ref 98–110)
CHOLEST SERPL-MCNC: 157 MG/DL
CHOLEST/HDLC SERPL: 3.5 (CALC)
CO2 SERPL-SCNC: 28 MMOL/L (ref 20–32)
CREAT SERPL-MCNC: 0.99 MG/DL (ref 0.7–1.33)
EOSINOPHIL # BLD AUTO: 52 CELLS/UL (ref 15–500)
EOSINOPHIL NFR BLD AUTO: 0.8 %
ERYTHROCYTE [DISTWIDTH] IN BLOOD BY AUTOMATED COUNT: 12.3 % (ref 11–15)
GLOBULIN SER CALC-MCNC: 2.2 G/DL (CALC) (ref 1.9–3.7)
GLUCOSE SERPL-MCNC: 96 MG/DL (ref 65–99)
HCT VFR BLD AUTO: 44.2 % (ref 38.5–50)
HDLC SERPL-MCNC: 45 MG/DL
HGB BLD-MCNC: 14.7 G/DL (ref 13.2–17.1)
LDLC SERPL CALC-MCNC: 98 MG/DL (CALC)
LYMPHOCYTES # BLD AUTO: 1612 CELLS/UL (ref 850–3900)
LYMPHOCYTES NFR BLD AUTO: 24.8 %
MCH RBC QN AUTO: 29.7 PG (ref 27–33)
MCHC RBC AUTO-ENTMCNC: 33.3 G/DL (ref 32–36)
MCV RBC AUTO: 89.3 FL (ref 80–100)
MONOCYTES # BLD AUTO: 624 CELLS/UL (ref 200–950)
MONOCYTES NFR BLD AUTO: 9.6 %
NEUTROPHILS # BLD AUTO: 4160 CELLS/UL (ref 1500–7800)
NEUTROPHILS NFR BLD AUTO: 64 %
NONHDLC SERPL-MCNC: 112 MG/DL (CALC)
PLATELET # BLD AUTO: 297 THOUSAND/UL (ref 140–400)
PMV BLD REES-ECKER: 10.3 FL (ref 7.5–12.5)
POTASSIUM SERPL-SCNC: 4.4 MMOL/L (ref 3.5–5.3)
PROT SERPL-MCNC: 6.4 G/DL (ref 6.1–8.1)
PSA SERPL-MCNC: 0.7 NG/ML
RBC # BLD AUTO: 4.95 MILLION/UL (ref 4.2–5.8)
SL AMB EGFR AFRICAN AMERICAN: 102 ML/MIN/1.73M2
SL AMB EGFR NON AFRICAN AMERICAN: 88 ML/MIN/1.73M2
SODIUM SERPL-SCNC: 138 MMOL/L (ref 135–146)
TRIGL SERPL-MCNC: 57 MG/DL
TSH SERPL-ACNC: 2.21 MIU/L (ref 0.4–4.5)
WBC # BLD AUTO: 6.5 THOUSAND/UL (ref 3.8–10.8)

## 2019-09-17 ENCOUNTER — OFFICE VISIT (OUTPATIENT)
Dept: FAMILY MEDICINE CLINIC | Facility: CLINIC | Age: 49
End: 2019-09-17
Payer: COMMERCIAL

## 2019-09-17 VITALS
TEMPERATURE: 97.6 F | WEIGHT: 256 LBS | DIASTOLIC BLOOD PRESSURE: 80 MMHG | RESPIRATION RATE: 18 BRPM | BODY MASS INDEX: 34.67 KG/M2 | HEIGHT: 72 IN | OXYGEN SATURATION: 97 % | HEART RATE: 60 BPM | SYSTOLIC BLOOD PRESSURE: 124 MMHG

## 2019-09-17 DIAGNOSIS — I10 ESSENTIAL HYPERTENSION: Primary | ICD-10-CM

## 2019-09-17 PROCEDURE — 99213 OFFICE O/P EST LOW 20 MIN: CPT | Performed by: FAMILY MEDICINE

## 2019-09-17 PROCEDURE — 3008F BODY MASS INDEX DOCD: CPT | Performed by: FAMILY MEDICINE

## 2019-09-17 PROCEDURE — 3074F SYST BP LT 130 MM HG: CPT | Performed by: FAMILY MEDICINE

## 2019-09-17 PROCEDURE — 3079F DIAST BP 80-89 MM HG: CPT | Performed by: FAMILY MEDICINE

## 2019-09-17 NOTE — PROGRESS NOTES
FAMILY PRACTICE OFFICE VISIT       NAME: Bob Martínez  AGE: 46 y o  SEX: male       : 1970        MRN: [id number]    DATE: 2019  TIME: 6:03 PM    Assessment and Plan     Problem List Items Addressed This Visit        Cardiovascular and Mediastinum    Hypertension - Primary     Hypertension  Patient blood pressure is stable at this time he will continue with current regimen of medications  Chief Complaint     Chief Complaint   Patient presents with    Follow-up     6 month        History of Present Illness     Patient denies any recent illness  He has lost close to 40 lb since 2018  He has been trying to watch his diet  He does use and exercise bicycle 0 5 hour a day  He does continue to struggle with with bilateral knee pains from degenerative joint disease  He does see his orthopedist for cortisone injections  I reviewed his most recent blood test results      Review of Systems   Review of Systems   Constitutional: Negative  HENT: Negative  Respiratory: Negative  Cardiovascular: Negative  Gastrointestinal: Negative  Genitourinary: Negative  Musculoskeletal:        As per HPI   Neurological: Negative  Psychiatric/Behavioral: Negative  Active Problem List     Patient Active Problem List   Diagnosis    Hypertension    Esophageal reflux    Onychomycosis    Bursitis of heel, right    Lesion of mouth    Rupture of right quadriceps tendon    Obesity (BMI 30-39  9)       Past Medical History:  Past Medical History:   Diagnosis Date    Acute meniscal tear of left knee     Arthritis     Carpal tunnel syndrome, unspecified upper limb     Hypercholesterolemia     Hypertension     Morbid obesity ([address] )        Past Surgical History:  Past Surgical History:   Procedure Laterality Date    ARTHROSCOPY KNEE      QUADRICEPS REPAIR         Family History:  Family History   Problem Relation Age of Onset    Atrial fibrillation Mother CARDIAC PACEMAKER    Parkinsonism Mother     Bleeding Disorder Father         ACTIVE INTERNAL BLEEDING    Liver disease Father         LIVER DAMAGE       Social History:  Social History     Socioeconomic History    Marital status: Single     Spouse name: Not on file    Number of children: Not on file    Years of education: Not on file    Highest education level: Not on file   Occupational History    Not on file   Social Needs    Financial resource strain: Not on file    Food insecurity:     Worry: Not on file     Inability: Not on file    Transportation needs:     Medical: Not on file     Non-medical: Not on file   Tobacco Use    Smoking status: Never Smoker    Smokeless tobacco: Never Used   Substance and Sexual Activity    Alcohol use: No    Drug use: No    Sexual activity: Not on file   Lifestyle    Physical activity:     Days per week: Not on file     Minutes per session: Not on file    Stress: Not on file   Relationships    Social connections:     Talks on phone: Not on file     Gets together: Not on file     Attends Protestant service: Not on file     Active member of club or organization: Not on file     Attends meetings of clubs or organizations: Not on file     Relationship status: Not on file    Intimate partner violence:     Fear of current or ex partner: Not on file     Emotionally abused: Not on file     Physically abused: Not on file     Forced sexual activity: Not on file   Other Topics Concern    Not on file   Social History Narrative    Not on file       Objective     Vitals:    09/17/19 1642   BP: 124/80   Pulse: 60   Resp: 18   Temp: 97 6 °F (36 4 °C)   SpO2: 97%     Wt Readings from Last 3 Encounters:   09/17/19 116 kg (256 lb)   03/28/19 128 kg (282 lb 3 2 oz)   11/05/18 133 kg (292 lb 8 oz)       Physical Exam   Constitutional: He is oriented to person, place, and time  No distress     HENT:   Right Ear: External ear normal    Left Ear: External ear normal    Mouth/Throat: Oropharynx is clear and moist  No oropharyngeal exudate  Tympanic membranes within normal limits bilaterally   Neck:   No carotid bruit   Cardiovascular: Normal heart sounds  Regular rate and rhythm with no murmurs   Pulmonary/Chest: Breath sounds normal    Lungs are clear to auscultation without wheezes,rales, or rhonchi   Musculoskeletal: He exhibits no edema  Lymphadenopathy:     He has no cervical adenopathy  Neurological: He is alert and oriented to person, place, and time  Psychiatric: He has a normal mood and affect   His behavior is normal  Judgment and thought content normal        Pertinent Laboratory/Diagnostic Studies:  Lab Results   Component Value Date    GLUCOSE 84 10/17/2013    BUN 16 09/14/2019    CREATININE 0 99 09/14/2019    CALCIUM 9 3 09/14/2019     02/18/2017    K 4 4 09/14/2019    CO2 28 09/14/2019     09/14/2019     Lab Results   Component Value Date    ALT 15 09/14/2019    AST 14 09/14/2019    ALKPHOS 54 09/14/2019    BILITOT 0 6 02/18/2017       Lab Results   Component Value Date    WBC 6 5 09/14/2019    HGB 14 7 09/14/2019    HCT 44 2 09/14/2019    MCV 89 3 09/14/2019     09/14/2019       Lab Results   Component Value Date    TSH 2 21 09/14/2019       Lab Results   Component Value Date    CHOL 175 10/17/2013     Lab Results   Component Value Date    TRIG 57 09/14/2019     Lab Results   Component Value Date    HDL 45 09/14/2019     Lab Results   Component Value Date    LDLCALC 111 10/17/2013     No results found for: HGBA1C    Results for orders placed or performed in visit on 09/14/19   Lipid panel   Result Value Ref Range    Total Cholesterol 157 <200 mg/dL    HDL 45 >40 mg/dL    Triglycerides 57 <150 mg/dL    LDL Direct 98 mg/dL (calc)    Chol HDLC Ratio 3 5 <5 0 (calc)    Non-HDL Cholesterol 112 <130 mg/dL (calc)   Comprehensive metabolic panel   Result Value Ref Range    Glucose, Random 96 65 - 99 mg/dL    BUN 16 7 - 25 mg/dL    Creatinine 0 99 0 70 - 1 33 mg/dL    eGFR Non  88 > OR = 60 mL/min/1 73m2    eGFR  102 > OR = 60 mL/min/1 73m2    SL AMB BUN/CREATININE RATIO NOT APPLICABLE 6 - 22 (calc)    Sodium 138 135 - 146 mmol/L    Potassium 4 4 3 5 - 5 3 mmol/L    Chloride 102 98 - 110 mmol/L    CO2 28 20 - 32 mmol/L    SL AMB CALCIUM 9 3 8 6 - 10 3 mg/dL    Protein, Total 6 4 6 1 - 8 1 g/dL    Albumin 4 2 3 6 - 5 1 g/dL    Globulin 2 2 1 9 - 3 7 g/dL (calc)    Albumin/Globulin Ratio 1 9 1 0 - 2 5 (calc)    TOTAL BILIRUBIN 1 0 0 2 - 1 2 mg/dL    Alkaline Phosphatase 54 40 - 115 U/L    AST 14 10 - 35 U/L    ALT 15 9 - 46 U/L   CBC and differential   Result Value Ref Range    White Blood Cell Count 6 5 3 8 - 10 8 Thousand/uL    Red Blood Cell Count 4 95 4 20 - 5 80 Million/uL    Hemoglobin 14 7 13 2 - 17 1 g/dL    HCT 44 2 38 5 - 50 0 %    MCV 89 3 80 0 - 100 0 fL    MCH 29 7 27 0 - 33 0 pg    MCHC 33 3 32 0 - 36 0 g/dL    RDW 12 3 11 0 - 15 0 %    Platelet Count 542 340 - 400 Thousand/uL    SL AMB MPV 10 3 7 5 - 12 5 fL    Neutrophils (Absolute) 4,160 1,500 - 7,800 cells/uL    Lymphocytes (Absolute) 1,612 850 - 3,900 cells/uL    Monocytes (Absolute) 624 200 - 950 cells/uL    Eosinophils (Absolute) 52 15 - 500 cells/uL    Basophils ABS 52 0 - 200 cells/uL    Neutrophils 64 %    Lymphocytes 24 8 %    Monocytes 9 6 %    Eosinophils 0 8 %    Basophils PCT 0 8 %   TSH, 3rd generation   Result Value Ref Range    TSH 2 21 0 40 - 4 50 mIU/L   PSA, Total Screen   Result Value Ref Range    Prostate Specific Antigen Total 0 7 < OR = 4 0 ng/mL       No orders of the defined types were placed in this encounter        ALLERGIES:  No Known Allergies    Current Medications     Current Outpatient Medications   Medication Sig Dispense Refill    ibuprofen (MOTRIN) 800 mg tablet Take 800 mg by mouth every 8 (eight) hours as needed       losartan-hydrochlorothiazide (HYZAAR) 50-12 5 mg per tablet TAKE ONE TABLET BY MOUTH ONE TIME DAILY 90 tablet 3     No current facility-administered medications for this visit            Health Maintenance     Health Maintenance   Topic Date Due    BMI: Followup Plan  12/11/1986    DTaP,Tdap,and Td Vaccines (1 - Tdap) 12/11/1989    INFLUENZA VACCINE  07/01/2019    Depression Screening PHQ  09/17/2020    BMI: Adult  09/17/2020    CRC Screening: Cologuard  04/09/2022    Pneumococcal Vaccine: 65+ Years (1 of 2 - PCV13) 12/11/2033    Pneumococcal Vaccine: Pediatrics (0 to 5 Years) and At-Risk Patients (6 to 59 Years)  Aged Out    HEPATITIS B VACCINES  Aged [employer] History   Administered Date(s) Administered     Influenza (IM) Preservative Free 10/01/2018    INFLUENZA 10/23/2013    Influenza TIV (IM) 73/52/2572       Andriy Gordon MD

## 2019-09-17 NOTE — ASSESSMENT & PLAN NOTE
Hypertension  Patient blood pressure is stable at this time he will continue with current regimen of medications

## 2019-11-05 ENCOUNTER — IMMUNIZATIONS (OUTPATIENT)
Dept: FAMILY MEDICINE CLINIC | Facility: CLINIC | Age: 49
End: 2019-11-05
Payer: COMMERCIAL

## 2019-11-05 DIAGNOSIS — Z23 FLU VACCINE NEED: Primary | ICD-10-CM

## 2019-11-05 PROCEDURE — 90471 IMMUNIZATION ADMIN: CPT

## 2019-11-05 PROCEDURE — 90682 RIV4 VACC RECOMBINANT DNA IM: CPT

## 2019-11-11 DIAGNOSIS — I10 ESSENTIAL HYPERTENSION: ICD-10-CM

## 2019-11-11 RX ORDER — LOSARTAN POTASSIUM AND HYDROCHLOROTHIAZIDE 12.5; 5 MG/1; MG/1
TABLET ORAL
Qty: 90 TABLET | Refills: 3 | Status: SHIPPED | OUTPATIENT
Start: 2019-11-11 | End: 2020-05-21 | Stop reason: ALTCHOICE

## 2020-01-20 ENCOUNTER — TELEPHONE (OUTPATIENT)
Dept: FAMILY MEDICINE CLINIC | Facility: CLINIC | Age: 50
End: 2020-01-20

## 2020-01-20 DIAGNOSIS — I10 ESSENTIAL HYPERTENSION: Primary | ICD-10-CM

## 2020-01-20 RX ORDER — VALSARTAN AND HYDROCHLOROTHIAZIDE 80; 12.5 MG/1; MG/1
1 TABLET, FILM COATED ORAL DAILY
Qty: 90 TABLET | Refills: 1 | Status: SHIPPED | OUTPATIENT
Start: 2020-01-20 | End: 2020-07-13

## 2020-01-20 NOTE — TELEPHONE ENCOUNTER
Patient called stating he is having a hard time getting the [place] Hydrochlorothiazide and he doesn't want to get two different medications, is there something else you can prescribe for his bloodpressure  Please advise patient at [phone]

## 2020-05-21 ENCOUNTER — OFFICE VISIT (OUTPATIENT)
Dept: FAMILY MEDICINE CLINIC | Facility: CLINIC | Age: 50
End: 2020-05-21
Payer: COMMERCIAL

## 2020-05-21 VITALS
TEMPERATURE: 97.5 F | SYSTOLIC BLOOD PRESSURE: 120 MMHG | RESPIRATION RATE: 15 BRPM | HEIGHT: 72 IN | DIASTOLIC BLOOD PRESSURE: 80 MMHG | BODY MASS INDEX: 37.27 KG/M2 | OXYGEN SATURATION: 98 % | HEART RATE: 69 BPM | WEIGHT: 275.2 LBS

## 2020-05-21 DIAGNOSIS — M25.562 CHRONIC PAIN OF BOTH KNEES: ICD-10-CM

## 2020-05-21 DIAGNOSIS — I10 ESSENTIAL HYPERTENSION: Primary | ICD-10-CM

## 2020-05-21 DIAGNOSIS — G89.29 CHRONIC PAIN OF BOTH KNEES: ICD-10-CM

## 2020-05-21 DIAGNOSIS — M25.561 CHRONIC PAIN OF BOTH KNEES: ICD-10-CM

## 2020-05-21 PROCEDURE — 3079F DIAST BP 80-89 MM HG: CPT | Performed by: FAMILY MEDICINE

## 2020-05-21 PROCEDURE — 3008F BODY MASS INDEX DOCD: CPT | Performed by: FAMILY MEDICINE

## 2020-05-21 PROCEDURE — 3074F SYST BP LT 130 MM HG: CPT | Performed by: FAMILY MEDICINE

## 2020-05-21 PROCEDURE — 99213 OFFICE O/P EST LOW 20 MIN: CPT | Performed by: FAMILY MEDICINE

## 2020-05-21 PROCEDURE — 1036F TOBACCO NON-USER: CPT | Performed by: FAMILY MEDICINE

## 2020-05-22 PROBLEM — M25.562 CHRONIC PAIN OF BOTH KNEES: Status: ACTIVE | Noted: 2020-05-22

## 2020-05-22 PROBLEM — M25.561 CHRONIC PAIN OF BOTH KNEES: Status: ACTIVE | Noted: 2020-05-22

## 2020-05-22 PROBLEM — G89.29 CHRONIC PAIN OF BOTH KNEES: Status: ACTIVE | Noted: 2020-05-22

## 2020-07-13 DIAGNOSIS — I10 ESSENTIAL HYPERTENSION: ICD-10-CM

## 2020-07-13 RX ORDER — VALSARTAN AND HYDROCHLOROTHIAZIDE 80; 12.5 MG/1; MG/1
TABLET, FILM COATED ORAL
Qty: 90 TABLET | Refills: 1 | Status: SHIPPED | OUTPATIENT
Start: 2020-07-13 | End: 2021-01-06

## 2021-01-06 DIAGNOSIS — I10 ESSENTIAL HYPERTENSION: ICD-10-CM

## 2021-01-06 RX ORDER — VALSARTAN AND HYDROCHLOROTHIAZIDE 80; 12.5 MG/1; MG/1
TABLET, FILM COATED ORAL
Qty: 90 TABLET | Refills: 1 | Status: SHIPPED | OUTPATIENT
Start: 2021-01-06 | End: 2021-07-03

## 2021-01-24 LAB
ALBUMIN SERPL-MCNC: 4.1 G/DL (ref 3.6–5.1)
ALBUMIN/GLOB SERPL: 1.6 (CALC) (ref 1–2.5)
ALP SERPL-CCNC: 53 U/L (ref 35–144)
ALT SERPL-CCNC: 28 U/L (ref 9–46)
AST SERPL-CCNC: 22 U/L (ref 10–35)
BILIRUB SERPL-MCNC: 0.6 MG/DL (ref 0.2–1.2)
BUN SERPL-MCNC: 11 MG/DL (ref 7–25)
BUN/CREAT SERPL: ABNORMAL (CALC) (ref 6–22)
CALCIUM SERPL-MCNC: 9.4 MG/DL (ref 8.6–10.3)
CHLORIDE SERPL-SCNC: 102 MMOL/L (ref 98–110)
CHOLEST SERPL-MCNC: 184 MG/DL
CHOLEST/HDLC SERPL: 4.5 (CALC)
CO2 SERPL-SCNC: 31 MMOL/L (ref 20–32)
CREAT SERPL-MCNC: 0.99 MG/DL (ref 0.7–1.33)
GLOBULIN SER CALC-MCNC: 2.6 G/DL (CALC) (ref 1.9–3.7)
GLUCOSE SERPL-MCNC: 111 MG/DL (ref 65–99)
HDLC SERPL-MCNC: 41 MG/DL
LDLC SERPL CALC-MCNC: 125 MG/DL (CALC)
NONHDLC SERPL-MCNC: 143 MG/DL (CALC)
POTASSIUM SERPL-SCNC: 4.7 MMOL/L (ref 3.5–5.3)
PROT SERPL-MCNC: 6.7 G/DL (ref 6.1–8.1)
SL AMB EGFR AFRICAN AMERICAN: 101 ML/MIN/1.73M2
SL AMB EGFR NON AFRICAN AMERICAN: 87 ML/MIN/1.73M2
SODIUM SERPL-SCNC: 139 MMOL/L (ref 135–146)
TRIGL SERPL-MCNC: 83 MG/DL
TSH SERPL-ACNC: 2.21 MIU/L (ref 0.4–4.5)

## 2021-01-29 ENCOUNTER — OFFICE VISIT (OUTPATIENT)
Dept: FAMILY MEDICINE CLINIC | Facility: CLINIC | Age: 51
End: 2021-01-29
Payer: COMMERCIAL

## 2021-01-29 VITALS
HEART RATE: 62 BPM | DIASTOLIC BLOOD PRESSURE: 78 MMHG | OXYGEN SATURATION: 99 % | HEIGHT: 73 IN | BODY MASS INDEX: 36.31 KG/M2 | TEMPERATURE: 97.1 F | SYSTOLIC BLOOD PRESSURE: 118 MMHG | RESPIRATION RATE: 18 BRPM

## 2021-01-29 DIAGNOSIS — R35.1 NOCTURIA: ICD-10-CM

## 2021-01-29 DIAGNOSIS — I10 ESSENTIAL HYPERTENSION: Primary | ICD-10-CM

## 2021-01-29 DIAGNOSIS — Z00.00 WELL ADULT EXAM: ICD-10-CM

## 2021-01-29 PROCEDURE — 99396 PREV VISIT EST AGE 40-64: CPT | Performed by: FAMILY MEDICINE

## 2021-01-31 PROBLEM — Z00.00 WELL ADULT EXAM: Status: ACTIVE | Noted: 2021-01-31

## 2021-01-31 NOTE — ASSESSMENT & PLAN NOTE
Well adult  Overall patient appears to be in stable health  His colonoscopy is up-to-date  He will obtain blood work to check PSA level  I reviewed his most recent blood test results    He was encouraged to continue trying to have a regular form of exercise with stationary bicycle

## 2021-01-31 NOTE — PROGRESS NOTES
FAMILY PRACTICE OFFICE VISIT       NAME: Bob Martínez  AGE: 44 y o  SEX: male       : 1970        MRN: [id number]    DATE: 2021  TIME: 11:17 AM    Assessment and Plan     Problem List Items Addressed This Visit        Cardiovascular and Mediastinum    Hypertension - Primary      Hypertension  Patient blood pressure is stable at this time and he will continue with current regimen of medications  Other    Well adult exam      Well adult  Overall patient appears to be in stable health  His colonoscopy is up-to-date  He will obtain blood work to check PSA level  I reviewed his most recent blood test results  He was encouraged to continue trying to have a regular form of exercise with stationary bicycle           Other Visit Diagnoses     Nocturia        Relevant Orders    PSA, Total Screen              Chief Complaint     Chief Complaint   Patient presents with    Annual Exam       History of Present Illness       Patient in the office for annual wellness exam   He denies any recent illness  He does complain of chronic knee pains for which she sees orthopedist for injections  He has not been able to be quite is active as in the past but does use a stationary bicycle for exercise  He is working almost entirely from home during pandemic  His colonoscopy is up-to-date from 2019  I reviewed his most recent blood test results      Review of Systems   Review of Systems   Constitutional: Negative  HENT: Negative  Eyes: Negative  Respiratory: Negative  Cardiovascular: Negative  Gastrointestinal: Negative  Endocrine: Negative  Genitourinary: Negative  Musculoskeletal: Positive for arthralgias  As per HPI   Skin: Negative  Allergic/Immunologic: Negative  Neurological: Negative  Hematological: Negative  Psychiatric/Behavioral: Negative          Active Problem List     Patient Active Problem List   Diagnosis    Hypertension    Esophageal reflux    Onychomycosis    Bursitis of heel, right    Lesion of mouth    Rupture of right quadriceps tendon    Obesity (BMI 30-39  9)    Chronic pain of both knees    Well adult exam       Past Medical History:  Past Medical History:   Diagnosis Date    Acute meniscal tear of left knee     Arthritis     Carpal tunnel syndrome, unspecified upper limb     Hypercholesterolemia     Hypertension     Morbid obesity ([address] )        Past Surgical History:  Past Surgical History:   Procedure Laterality Date    ARTHROSCOPY KNEE      QUADRICEPS REPAIR         Family History:  Family History   Problem Relation Age of Onset    Atrial fibrillation Mother         CARDIAC PACEMAKER    Parkinsonism Mother     Bleeding Disorder Father         ACTIVE INTERNAL BLEEDING    Liver disease Father         LIVER DAMAGE       Social History:  Social History     Socioeconomic History    Marital status: Single     Spouse name: Not on file    Number of children: Not on file    Years of education: Not on file    Highest education level: Not on file   Occupational History    Not on file   Social Needs    Financial resource strain: Not on file    Food insecurity     Worry: Not on file     Inability: Not on file    Transportation needs     Medical: Not on file     Non-medical: Not on file   Tobacco Use    Smoking status: Never Smoker    Smokeless tobacco: Never Used   Substance and Sexual Activity    Alcohol use: No    Drug use: No    Sexual activity: Not on file   Lifestyle    Physical activity     Days per week: Not on file     Minutes per session: Not on file    Stress: Not on file   Relationships    Social connections     Talks on phone: Not on file     Gets together: Not on file     Attends Quaker service: Not on file     Active member of club or organization: Not on file     Attends meetings of clubs or organizations: Not on file     Relationship status: Not on file    Intimate partner violence     Fear of current or ex partner: Not on file     Emotionally abused: Not on file     Physically abused: Not on file     Forced sexual activity: Not on file   Other Topics Concern    Not on file   Social History Narrative    Not on file       Objective     Vitals:    01/29/21 1551   BP: 118/78   Pulse: 62   Resp: 18   Temp: (!) 97 1 °F (36 2 °C)   SpO2: 99%     Wt Readings from Last 3 Encounters:   05/21/20 125 kg (275 lb 3 2 oz)   09/17/19 116 kg (256 lb)   03/28/19 128 kg (282 lb 3 2 oz)       Physical Exam  Vitals signs and nursing note reviewed  Constitutional:       General: He is not in acute distress  Appearance: Normal appearance  He is well-developed  He is not ill-appearing  HENT:      Head: Normocephalic  Right Ear: Tympanic membrane, ear canal and external ear normal  There is no impacted cerumen  Left Ear: Tympanic membrane, ear canal and external ear normal  There is no impacted cerumen  Eyes:      General:         Right eye: No discharge  Left eye: No discharge  Extraocular Movements: Extraocular movements intact  Conjunctiva/sclera: Conjunctivae normal       Pupils: Pupils are equal, round, and reactive to light  Neck:      Musculoskeletal: Normal range of motion and neck supple  Thyroid: No thyromegaly  Cardiovascular:      Rate and Rhythm: Normal rate and regular rhythm  Heart sounds: Normal heart sounds  No murmur  Pulmonary:      Effort: Pulmonary effort is normal  No respiratory distress  Breath sounds: Normal breath sounds  No wheezing or rales  Abdominal:      General: Bowel sounds are normal       Palpations: Abdomen is soft  Tenderness: There is no abdominal tenderness  There is no guarding or rebound  Comments: NO hepatospenomegaly   Musculoskeletal: Normal range of motion  Right lower leg: No edema  Left lower leg: No edema  Lymphadenopathy:      Cervical: No cervical adenopathy     Skin:     Comments: NO RASHES Neurological:      General: No focal deficit present  Mental Status: He is alert and oriented to person, place, and time  Mental status is at baseline  Psychiatric:         Mood and Affect: Mood normal          Behavior: Behavior normal          Thought Content:  Thought content normal          Judgment: Judgment normal          Pertinent Laboratory/Diagnostic Studies:  Lab Results   Component Value Date    GLUCOSE 84 10/17/2013    BUN 11 01/23/2021    CREATININE 0 99 01/23/2021    CALCIUM 9 4 01/23/2021     02/18/2017    K 4 7 01/23/2021    CO2 31 01/23/2021     01/23/2021     Lab Results   Component Value Date    ALT 28 01/23/2021    AST 22 01/23/2021    ALKPHOS 53 01/23/2021    BILITOT 0 6 02/18/2017       Lab Results   Component Value Date    WBC 6 5 09/14/2019    HGB 14 7 09/14/2019    HCT 44 2 09/14/2019    MCV 89 3 09/14/2019     09/14/2019       Lab Results   Component Value Date    TSH 2 21 01/23/2021       Lab Results   Component Value Date    CHOL 175 10/17/2013     Lab Results   Component Value Date    TRIG 83 01/23/2021     Lab Results   Component Value Date    HDL 41 01/23/2021     Lab Results   Component Value Date    LDLCALC 125 (H) 01/23/2021     No results found for: HGBA1C    Results for orders placed or performed in visit on 01/23/21   Lipid panel   Result Value Ref Range    Total Cholesterol 184 <200 mg/dL    HDL 41 > OR = 40 mg/dL    Triglycerides 83 <150 mg/dL    LDL Calculated 125 (H) mg/dL (calc)    Chol HDLC Ratio 4 5 <5 0 (calc)    Non-HDL Cholesterol 143 (H) <130 mg/dL (calc)   Comprehensive metabolic panel   Result Value Ref Range    Glucose, Random 111 (H) 65 - 99 mg/dL    BUN 11 7 - 25 mg/dL    Creatinine 0 99 0 70 - 1 33 mg/dL    eGFR Non  87 > OR = 60 mL/min/1 73m2    eGFR  101 > OR = 60 mL/min/1 73m2    SL AMB BUN/CREATININE RATIO NOT APPLICABLE 6 - 22 (calc)    Sodium 139 135 - 146 mmol/L    Potassium 4 7 3 5 - 5 3 mmol/L Chloride 102 98 - 110 mmol/L    CO2 31 20 - 32 mmol/L    Calcium 9 4 8 6 - 10 3 mg/dL    Protein, Total 6 7 6 1 - 8 1 g/dL    Albumin 4 1 3 6 - 5 1 g/dL    Globulin 2 6 1 9 - 3 7 g/dL (calc)    Albumin/Globulin Ratio 1 6 1 0 - 2 5 (calc)    TOTAL BILIRUBIN 0 6 0 2 - 1 2 mg/dL    Alkaline Phosphatase 53 35 - 144 U/L    AST 22 10 - 35 U/L    ALT 28 9 - 46 U/L   TSH, 3rd generation   Result Value Ref Range    TSH 2 21 0 40 - 4 50 mIU/L       Orders Placed This Encounter   Procedures    PSA, Total Screen       ALLERGIES:  No Known Allergies    Current Medications     Current Outpatient Medications   Medication Sig Dispense Refill    valsartan-hydrochlorothiazide (DIOVAN-HCT) 80-12 5 MG per tablet TAKE 1 TABLET BY MOUTH EVERY DAY 90 tablet 1     No current facility-administered medications for this visit            Health Maintenance     Health Maintenance   Topic Date Due    HIV Screening  12/11/1983    BMI: Followup Plan  12/11/1986    Annual Physical  12/11/1986    DTaP,Tdap,and Td Vaccines (1 - Tdap) 12/11/1989    BMI: Adult  05/21/2021    Depression Screening PHQ  01/29/2022    Colorectal Cancer Screening  04/09/2022    Influenza Vaccine  Completed    Pneumococcal Vaccine: Pediatrics (0 to 5 Years) and At-Risk Patients (6 to 59 Years)  Aged Out    HIB Vaccine  Aged Out    Hepatitis B Vaccine  Aged Out    IPV Vaccine  Aged Out    Hepatitis A Vaccine  Aged Out    Meningococcal ACWY Vaccine  Aged Out    HPV Vaccine  Aged [employer] History   Administered Date(s) Administered    INFLUENZA 10/23/2013, 10/01/2018    Influenza, injectable, quadrivalent, preservative free 0 5 mL 09/19/2020    Influenza, recombinant, quadrivalent,injectable, preservative free 11/05/2019    Influenza, seasonal, injectable 10/04/2016    Influenza, seasonal, injectable, preservative free 81/20/2716       Marck Skinner MD

## 2021-01-31 NOTE — ASSESSMENT & PLAN NOTE
Hypertension  Patient blood pressure is stable at this time and he will continue with current regimen of medications

## 2021-02-07 LAB — PSA SERPL-MCNC: 0.8 NG/ML

## 2021-03-08 ENCOUNTER — OFFICE VISIT (OUTPATIENT)
Dept: FAMILY MEDICINE CLINIC | Facility: CLINIC | Age: 51
End: 2021-03-08
Payer: COMMERCIAL

## 2021-03-08 VITALS
TEMPERATURE: 97.3 F | OXYGEN SATURATION: 95 % | WEIGHT: 278.56 LBS | HEIGHT: 73 IN | DIASTOLIC BLOOD PRESSURE: 80 MMHG | BODY MASS INDEX: 36.92 KG/M2 | HEART RATE: 59 BPM | RESPIRATION RATE: 16 BRPM | SYSTOLIC BLOOD PRESSURE: 130 MMHG

## 2021-03-08 DIAGNOSIS — H10.9 CONJUNCTIVITIS OF LEFT EYE, UNSPECIFIED CONJUNCTIVITIS TYPE: Primary | ICD-10-CM

## 2021-03-08 PROCEDURE — 1036F TOBACCO NON-USER: CPT | Performed by: FAMILY MEDICINE

## 2021-03-08 PROCEDURE — 3008F BODY MASS INDEX DOCD: CPT | Performed by: FAMILY MEDICINE

## 2021-03-08 PROCEDURE — 3075F SYST BP GE 130 - 139MM HG: CPT | Performed by: FAMILY MEDICINE

## 2021-03-08 PROCEDURE — 3725F SCREEN DEPRESSION PERFORMED: CPT | Performed by: FAMILY MEDICINE

## 2021-03-08 PROCEDURE — 3079F DIAST BP 80-89 MM HG: CPT | Performed by: FAMILY MEDICINE

## 2021-03-08 PROCEDURE — 99213 OFFICE O/P EST LOW 20 MIN: CPT | Performed by: FAMILY MEDICINE

## 2021-03-08 RX ORDER — TOBRAMYCIN AND DEXAMETHASONE 3; 1 MG/ML; MG/ML
2 SUSPENSION/ DROPS OPHTHALMIC 4 TIMES DAILY
Qty: 5 ML | Refills: 0 | Status: SHIPPED | OUTPATIENT
Start: 2021-03-08 | End: 2021-08-04 | Stop reason: ALTCHOICE

## 2021-03-08 NOTE — PROGRESS NOTES
FAMILY PRACTICE OFFICE VISIT       NAME: Bob Martínez  AGE: 44 y o  SEX: male       : 1970        MRN: [id number]    DATE: 3/8/2021  TIME: 5:13 PM    Assessment and Plan     Problem List Items Addressed This Visit        Other    Conjunctivitis of left eye - Primary      Conjunctivitis  Patient given prescription for TobraDex ophthalmic suspension to use 2 drops to his left eye 4 times a day for 5-7 days  Patient will call if symptoms persist after medication completed         Relevant Medications    tobramycin-dexamethasone (TOBRADEX) ophthalmic suspension        BMI Counseling: Body mass index is 36 75 kg/m²  The BMI is above normal  Nutrition recommendations include decreasing portion sizes and moderation in carbohydrate intake  Exercise recommendations include exercising 3-5 times per week  Chief Complaint     Chief Complaint   Patient presents with    Blepharitis     x 1 week        History of Present Illness      Patient states he had a 1-2 week history of left upper eyelid swelling and some minimal discomfort  He believes he has had some tearing of his eye as well  He denies any changes in visual acuity      Review of Systems   Review of Systems   Constitutional: Negative  HENT: Negative  Eyes:        As per HPI   Respiratory: Negative  Active Problem List     Patient Active Problem List   Diagnosis    Hypertension    Esophageal reflux    Onychomycosis    Bursitis of heel, right    Lesion of mouth    Rupture of right quadriceps tendon    Obesity (BMI 30-39  9)    Chronic pain of both knees    Well adult exam    Conjunctivitis of left eye       Past Medical History:  Past Medical History:   Diagnosis Date    Acute meniscal tear of left knee     Arthritis     Carpal tunnel syndrome, unspecified upper limb     Hypercholesterolemia     Hypertension     Morbid obesity ([address] )        Past Surgical History:  Past Surgical History:   Procedure Laterality Date  ARTHROSCOPY KNEE      QUADRICEPS REPAIR         Family History:  Family History   Problem Relation Age of Onset    Atrial fibrillation Mother         CARDIAC PACEMAKER    Parkinsonism Mother     Bleeding Disorder Father         ACTIVE INTERNAL BLEEDING    Liver disease Father         LIVER DAMAGE       Social History:  Social History     Socioeconomic History    Marital status: Single     Spouse name: Not on file    Number of children: Not on file    Years of education: Not on file    Highest education level: Not on file   Occupational History    Not on file   Social Needs    Financial resource strain: Not on file    Food insecurity     Worry: Not on file     Inability: Not on file    Transportation needs     Medical: Not on file     Non-medical: Not on file   Tobacco Use    Smoking status: Never Smoker    Smokeless tobacco: Never Used   Substance and Sexual Activity    Alcohol use: No    Drug use: No    Sexual activity: Not on file   Lifestyle    Physical activity     Days per week: Not on file     Minutes per session: Not on file    Stress: Not on file   Relationships    Social connections     Talks on phone: Not on file     Gets together: Not on file     Attends Mu-ism service: Not on file     Active member of club or organization: Not on file     Attends meetings of clubs or organizations: Not on file     Relationship status: Not on file    Intimate partner violence     Fear of current or ex partner: Not on file     Emotionally abused: Not on file     Physically abused: Not on file     Forced sexual activity: Not on file   Other Topics Concern    Not on file   Social History Narrative    Not on file       Objective     Vitals:    03/08/21 1531   BP: 130/80   Pulse: 59   Resp: 16   Temp: (!) 97 3 °F (36 3 °C)   SpO2: 95%     Wt Readings from Last 3 Encounters:   03/08/21 126 kg (278 lb 9 oz)   05/21/20 125 kg (275 lb 3 2 oz)   09/17/19 116 kg (256 lb)       Physical Exam  Constitutional:       General: He is not in acute distress  Appearance: Normal appearance  He is not ill-appearing  Eyes:      Extraocular Movements: Extraocular movements intact  Pupils: Pupils are equal, round, and reactive to light  Comments: Minimal left on a redness of conjunctiva  Minimal increase in clear tearing  Left upper eyelid red and minimally tender  Left eye appear to be within normal limits  Lymphadenopathy:      Cervical: No cervical adenopathy  Neurological:      Mental Status: He is alert  Pertinent Laboratory/Diagnostic Studies:  Lab Results   Component Value Date    GLUCOSE 84 10/17/2013    BUN 11 01/23/2021    CREATININE 0 99 01/23/2021    CALCIUM 9 4 01/23/2021     02/18/2017    K 4 7 01/23/2021    CO2 31 01/23/2021     01/23/2021     Lab Results   Component Value Date    ALT 28 01/23/2021    AST 22 01/23/2021    ALKPHOS 53 01/23/2021    BILITOT 0 6 02/18/2017       Lab Results   Component Value Date    WBC 6 5 09/14/2019    HGB 14 7 09/14/2019    HCT 44 2 09/14/2019    MCV 89 3 09/14/2019     09/14/2019       Lab Results   Component Value Date    TSH 2 21 01/23/2021       Lab Results   Component Value Date    CHOL 175 10/17/2013     Lab Results   Component Value Date    TRIG 83 01/23/2021     Lab Results   Component Value Date    HDL 41 01/23/2021     Lab Results   Component Value Date    LDLCALC 125 (H) 01/23/2021     No results found for: HGBA1C    Results for orders placed or performed in visit on 02/06/21   PSA, Total Screen   Result Value Ref Range    Prostate Specific Antigen Total 0 8 < OR = 4 0 ng/mL       No orders of the defined types were placed in this encounter        ALLERGIES:  No Known Allergies    Current Medications     Current Outpatient Medications   Medication Sig Dispense Refill    valsartan-hydrochlorothiazide (DIOVAN-HCT) 80-12 5 MG per tablet TAKE 1 TABLET BY MOUTH EVERY DAY 90 tablet 1    tobramycin-dexamethasone (TOBRADEX) ophthalmic suspension Administer 2 drops into the left eye 4 (four) times a day 5 mL 0     No current facility-administered medications for this visit            Health Maintenance     Health Maintenance   Topic Date Due    HIV Screening  12/11/1983    BMI: Followup Plan  12/11/1986    DTaP,Tdap,and Td Vaccines (1 - Tdap) 12/11/1989    Annual Physical  01/29/2022    Depression Screening PHQ  03/08/2022    BMI: Adult  03/08/2022    Colorectal Cancer Screening  04/09/2022    Influenza Vaccine  Completed    COVID-19 Vaccine  Completed    Pneumococcal Vaccine: Pediatrics (0 to 5 Years) and At-Risk Patients (6 to 59 Years)  Aged Out    HIB Vaccine  Aged Out    Hepatitis B Vaccine  Aged Out    IPV Vaccine  Aged Out    Hepatitis A Vaccine  Aged Out    Meningococcal ACWY Vaccine  Aged Out    HPV Vaccine  Aged [employer] History   Administered Date(s) Administered    INFLUENZA 10/23/2013, 10/01/2018    Influenza, injectable, quadrivalent, preservative free 0 5 mL 09/19/2020    Influenza, recombinant, quadrivalent,injectable, preservative free 11/05/2019    Influenza, seasonal, injectable 10/04/2016    Influenza, seasonal, injectable, preservative free 10/01/2018    SARS-CoV-2 / COVID-19 mRNA IM ([facility]) 01/21/2021, 74/85/7872       Quentin Bustillo MD

## 2021-03-08 NOTE — ASSESSMENT & PLAN NOTE
Conjunctivitis  Patient given prescription for TobraDex ophthalmic suspension to use 2 drops to his left eye 4 times a day for 5-7 days    Patient will call if symptoms persist after medication completed

## 2021-07-03 DIAGNOSIS — I10 ESSENTIAL HYPERTENSION: ICD-10-CM

## 2021-07-03 RX ORDER — VALSARTAN AND HYDROCHLOROTHIAZIDE 80; 12.5 MG/1; MG/1
TABLET, FILM COATED ORAL
Qty: 90 TABLET | Refills: 1 | Status: SHIPPED | OUTPATIENT
Start: 2021-07-03 | End: 2022-01-10

## 2021-07-29 ENCOUNTER — RA CDI HCC (OUTPATIENT)
Dept: OTHER | Facility: HOSPITAL | Age: 51
End: 2021-07-29

## 2021-07-29 NOTE — PROGRESS NOTES
[facility]  coding opportunities             Chart reviewed, (number of) suggestions sent to provider: 1               Number of suggestions NOT actually used: 1     Patients insurance company: [address]  (Med[employer]are [employer] and [employer])     Visit status: Patient arrived for their scheduled appointment     Provider never responded to [facility]  coding request     [facility]  coding opportunities             Chart reviewed, (number of) suggestions sent to provider: 1        E66 01 Morbid, severe, obesity due to excess calories ([facility] [address] )  BMI>35 with hypertension      If this is correct, please document and assess at your next visit 8/4/21              Patients insurance company: [address]  (Medicare [employer] and [employer])

## 2021-08-04 ENCOUNTER — OFFICE VISIT (OUTPATIENT)
Dept: FAMILY MEDICINE CLINIC | Facility: CLINIC | Age: 51
End: 2021-08-04
Payer: COMMERCIAL

## 2021-08-04 VITALS
SYSTOLIC BLOOD PRESSURE: 120 MMHG | BODY MASS INDEX: 36.76 KG/M2 | OXYGEN SATURATION: 96 % | HEIGHT: 73 IN | TEMPERATURE: 97.5 F | HEART RATE: 59 BPM | DIASTOLIC BLOOD PRESSURE: 70 MMHG | WEIGHT: 277.38 LBS | RESPIRATION RATE: 18 BRPM

## 2021-08-04 DIAGNOSIS — I10 ESSENTIAL HYPERTENSION: Primary | ICD-10-CM

## 2021-08-04 PROCEDURE — 1036F TOBACCO NON-USER: CPT | Performed by: FAMILY MEDICINE

## 2021-08-04 PROCEDURE — 99213 OFFICE O/P EST LOW 20 MIN: CPT | Performed by: FAMILY MEDICINE

## 2021-08-04 PROCEDURE — 3078F DIAST BP <80 MM HG: CPT | Performed by: FAMILY MEDICINE

## 2021-08-04 PROCEDURE — 3074F SYST BP LT 130 MM HG: CPT | Performed by: FAMILY MEDICINE

## 2021-08-04 PROCEDURE — 3008F BODY MASS INDEX DOCD: CPT | Performed by: FAMILY MEDICINE

## 2021-08-04 NOTE — ASSESSMENT & PLAN NOTE
Hypertension  Patient blood pressure is stable at this time he will continue current regimen of medications

## 2021-08-04 NOTE — PROGRESS NOTES
FAMILY PRACTICE OFFICE VISIT       NAME: Bob Martínez  AGE: 44 y o  SEX: male       : 1970        MRN: [id number]    DATE: 2021  TIME: 7:53 AM    Assessment and Plan     Problem List Items Addressed This Visit        Cardiovascular and Mediastinum    Hypertension - Primary      Hypertension  Patient blood pressure is stable at this time he will continue current regimen of medications  Chief Complaint     Chief Complaint   Patient presents with    Follow-up     6 month        History of Present Illness      Patient the office to review chronic medical condition  He denies any recent illness  He is not check blood pressures at home but is compliant with taking blood pressure medication  He continues to struggle with chronic bilateral knee pains from degenerative joint disease  He periodically receives cortisone injections to his knees  He has been unable to exercise recently since his stationary bicycle had broken down  He has had no changes in weight over the last 6 months  I reviewed his latest blood test results      Review of Systems   Review of Systems   Constitutional: Negative  HENT: Negative  Eyes: Negative  Respiratory: Negative  Cardiovascular: Negative  Gastrointestinal: Negative  Endocrine: Negative  Genitourinary: Negative  Musculoskeletal: Positive for arthralgias and gait problem  Skin: Negative  Allergic/Immunologic: Negative  Hematological: Negative  Psychiatric/Behavioral: Negative  Active Problem List     Patient Active Problem List   Diagnosis    Hypertension    Esophageal reflux    Onychomycosis    Bursitis of heel, right    Lesion of mouth    Rupture of right quadriceps tendon    Obesity (BMI 30-39  9)    Chronic pain of both knees    Well adult exam    Conjunctivitis of left eye       Past Medical History:  Past Medical History:   Diagnosis Date    Acute meniscal tear of left knee     Arthritis  Carpal tunnel syndrome, unspecified upper limb     Hypercholesterolemia     Hypertension     Morbid obesity ([address] )        Past Surgical History:  Past Surgical History:   Procedure Laterality Date    ARTHROSCOPY KNEE      QUADRICEPS REPAIR         Family History:  Family History   Problem Relation Age of Onset    Atrial fibrillation Mother         CARDIAC PACEMAKER    Parkinsonism Mother     Bleeding Disorder Father         ACTIVE INTERNAL BLEEDING    Liver disease Father         LIVER DAMAGE       Social History:  Social History     Socioeconomic History    Marital status: Single     Spouse name: Not on file    Number of children: Not on file    Years of education: Not on file    Highest education level: Not on file   Occupational History    Not on file   Tobacco Use    Smoking status: Never Smoker    Smokeless tobacco: Never Used   Vaping Use    Vaping Use: Never used   Substance and Sexual Activity    Alcohol use: No    Drug use: No    Sexual activity: Not on file   Other Topics Concern    Not on file   Social History Narrative    Not on file     Social Determinants of Health     Financial Resource Strain:     Difficulty of Paying Living Expenses:    Food Insecurity:     Worried About Running Out of Food in the Last Year:     Ran Out of Food in the Last Year:    Transportation Needs:     Lack of Transportation (Medical):      Lack of Transportation (Non-Medical):    Physical Activity:     Days of Exercise per Week:     Minutes of Exercise per Session:    Stress:     Feeling of Stress :    Social Connections:     Frequency of Communication with Friends and Family:     Frequency of Social Gatherings with Friends and Family:     Attends Sikh Services:     Active Member of Clubs or Organizations:     Attends Club or Organization Meetings:     Marital Status:    Intimate Partner Violence:     Fear of Current or Ex-Partner:     Emotionally Abused:     Physically Abused:  Sexually Abused:        Objective     Vitals:    08/04/21 0746   BP: 120/70   Pulse:    Resp:    Temp:    SpO2:      Wt Readings from Last 3 Encounters:   08/04/21 126 kg (277 lb 6 oz)   03/08/21 126 kg (278 lb 9 oz)   05/21/20 125 kg (275 lb 3 2 oz)       Physical Exam  Constitutional:       General: He is not in acute distress  Appearance: Normal appearance  He is not ill-appearing  HENT:      Head: Normocephalic and atraumatic  Eyes:      General:         Right eye: No discharge  Left eye: No discharge  Extraocular Movements: Extraocular movements intact  Conjunctiva/sclera: Conjunctivae normal       Pupils: Pupils are equal, round, and reactive to light  Neck:      Vascular: No carotid bruit  Cardiovascular:      Rate and Rhythm: Normal rate and regular rhythm  Heart sounds: Normal heart sounds  No murmur heard  Pulmonary:      Effort: Pulmonary effort is normal       Breath sounds: Normal breath sounds  No wheezing, rhonchi or rales  Abdominal:      General: Abdomen is flat  Bowel sounds are normal  There is no distension  Palpations: Abdomen is soft  Tenderness: There is no abdominal tenderness  There is no guarding or rebound  Musculoskeletal:      Right lower leg: No edema  Left lower leg: No edema  Lymphadenopathy:      Cervical: No cervical adenopathy  Skin:     Findings: No rash  Neurological:      General: No focal deficit present  Mental Status: He is alert and oriented to person, place, and time  Cranial Nerves: No cranial nerve deficit  Psychiatric:         Mood and Affect: Mood normal          Behavior: Behavior normal          Thought Content:  Thought content normal          Judgment: Judgment normal          Pertinent Laboratory/Diagnostic Studies:  Lab Results   Component Value Date    GLUCOSE 84 10/17/2013    BUN 11 01/23/2021    CREATININE 0 99 01/23/2021    CALCIUM 9 4 01/23/2021     02/18/2017    K 4 7 01/23/2021    CO2 31 01/23/2021     01/23/2021     Lab Results   Component Value Date    ALT 28 01/23/2021    AST 22 01/23/2021    ALKPHOS 53 01/23/2021    BILITOT 0 6 02/18/2017       Lab Results   Component Value Date    WBC 6 5 09/14/2019    HGB 14 7 09/14/2019    HCT 44 2 09/14/2019    MCV 89 3 09/14/2019     09/14/2019       Lab Results   Component Value Date    TSH 2 21 01/23/2021       Lab Results   Component Value Date    CHOL 175 10/17/2013     Lab Results   Component Value Date    TRIG 83 01/23/2021     Lab Results   Component Value Date    HDL 41 01/23/2021     Lab Results   Component Value Date    LDLCALC 125 (H) 01/23/2021     No results found for: HGBA1C    Results for orders placed or performed in visit on 02/06/21   PSA, Total Screen   Result Value Ref Range    Prostate Specific Antigen Total 0 8 < OR = 4 0 ng/mL       No orders of the defined types were placed in this encounter  ALLERGIES:  No Known Allergies    Current Medications     Current Outpatient Medications   Medication Sig Dispense Refill    valsartan-hydrochlorothiazide (DIOVAN-HCT) 80-12 5 MG per tablet TAKE 1 TABLET BY MOUTH EVERY DAY 90 tablet 1     No current facility-administered medications for this visit           Health Maintenance     Health Maintenance   Topic Date Due    Hepatitis C Screening  Never done    HIV Screening  Never done    DTaP,Tdap,and Td Vaccines (1 - Tdap) Never done    Influenza Vaccine (1) 09/01/2021    Annual Physical  01/29/2022    Depression Screening PHQ  03/08/2022    BMI: Followup Plan  03/08/2022    Colorectal Cancer Screening  04/09/2022    BMI: Adult  08/04/2022    COVID-19 Vaccine  Completed    Pneumococcal Vaccine: Pediatrics (0 to 5 Years) and At-Risk Patients (6 to 59 Years)  Aged Out    HIB Vaccine  Aged Out    Hepatitis B Vaccine  Aged Out    IPV Vaccine  Aged Out    Hepatitis A Vaccine  Aged Out    Meningococcal ACWY Vaccine  Aged Out    HPV Vaccine  Aged Out Immunization History   Administered Date(s) Administered    INFLUENZA 10/23/2013, 10/01/2018    Influenza, injectable, quadrivalent, preservative free 0 5 mL 09/19/2020    Influenza, recombinant, quadrivalent,injectable, preservative free 11/05/2019    Influenza, seasonal, injectable 10/04/2016    Influenza, seasonal, injectable, preservative free 10/01/2018    SARS-CoV-2 / COVID-19 mRNA IM ([facility]) 01/21/2021, 98/77/2230       Chinmay Crowe MD

## 2021-10-09 ENCOUNTER — IMMUNIZATIONS (OUTPATIENT)
Dept: FAMILY MEDICINE CLINIC | Facility: CLINIC | Age: 51
End: 2021-10-09
Payer: COMMERCIAL

## 2021-10-09 VITALS — TEMPERATURE: 97.6 F

## 2021-10-09 DIAGNOSIS — Z23 FLU VACCINE NEED: Primary | ICD-10-CM

## 2021-10-09 PROCEDURE — 90471 IMMUNIZATION ADMIN: CPT

## 2021-10-09 PROCEDURE — 90682 RIV4 VACC RECOMBINANT DNA IM: CPT

## 2022-01-10 DIAGNOSIS — I10 ESSENTIAL HYPERTENSION: ICD-10-CM

## 2022-01-10 RX ORDER — VALSARTAN AND HYDROCHLOROTHIAZIDE 80; 12.5 MG/1; MG/1
TABLET, FILM COATED ORAL
Qty: 90 TABLET | Refills: 1 | Status: SHIPPED | OUTPATIENT
Start: 2022-01-10 | End: 2022-07-02

## 2022-02-09 ENCOUNTER — OFFICE VISIT (OUTPATIENT)
Dept: FAMILY MEDICINE CLINIC | Facility: CLINIC | Age: 52
End: 2022-02-09
Payer: COMMERCIAL

## 2022-02-09 VITALS
HEART RATE: 61 BPM | RESPIRATION RATE: 18 BRPM | SYSTOLIC BLOOD PRESSURE: 132 MMHG | TEMPERATURE: 98 F | DIASTOLIC BLOOD PRESSURE: 78 MMHG | HEIGHT: 73 IN | WEIGHT: 287.13 LBS | BODY MASS INDEX: 38.05 KG/M2 | OXYGEN SATURATION: 96 %

## 2022-02-09 DIAGNOSIS — I10 ESSENTIAL HYPERTENSION: Primary | ICD-10-CM

## 2022-02-09 DIAGNOSIS — I10 PRIMARY HYPERTENSION: ICD-10-CM

## 2022-02-09 DIAGNOSIS — R35.1 NOCTURIA: ICD-10-CM

## 2022-02-09 DIAGNOSIS — Z00.00 WELL ADULT EXAM: ICD-10-CM

## 2022-02-09 PROCEDURE — 3008F BODY MASS INDEX DOCD: CPT | Performed by: FAMILY MEDICINE

## 2022-02-09 PROCEDURE — 3725F SCREEN DEPRESSION PERFORMED: CPT | Performed by: FAMILY MEDICINE

## 2022-02-09 PROCEDURE — 3075F SYST BP GE 130 - 139MM HG: CPT | Performed by: FAMILY MEDICINE

## 2022-02-09 PROCEDURE — 1036F TOBACCO NON-USER: CPT | Performed by: FAMILY MEDICINE

## 2022-02-09 PROCEDURE — 3078F DIAST BP <80 MM HG: CPT | Performed by: FAMILY MEDICINE

## 2022-02-09 PROCEDURE — 99396 PREV VISIT EST AGE 40-64: CPT | Performed by: FAMILY MEDICINE

## 2022-02-09 NOTE — PROGRESS NOTES
FAMILY PRACTICE OFFICE VISIT       NAME: Bob Martínez  AGE: 46 y o  SEX: male       : 1970        MRN: [id number]    DATE: 2022  TIME: 7:52 AM    Assessment and Plan     Problem List Items Addressed This Visit        Cardiovascular and Mediastinum    Hypertension     Hypertension  Patient blood pressure is stable at this time he will continue current regimen of medication            Other    Well adult exam     Well adult  Overall the patient appears to be in stable health  His COVID vaccination is up-to-date  Will call after 2022 to perform Cologuard testing for colon cancer screening  He will obtain blood work as ordered for further evaluation  We will make further recommendations pending results of test   Patient will see if his insurance covers Shingrix vaccination         Relevant Orders    CBC    Comprehensive metabolic panel    Lipid panel    TSH, 3rd generation      Other Visit Diagnoses     Essential hypertension    -  Primary    Relevant Orders    CBC    Comprehensive metabolic panel    Lipid panel    TSH, 3rd generation    Nocturia        Relevant Orders    PSA, Total Screen              Chief Complaint     Chief Complaint   Patient presents with    Follow-up     6 month        History of Present Illness     Patient the office to review chronic medical condition  He denies any recent illness  His COVID vaccination is up-to-date  Patient continues to see orthopedist for chronic left knee pain for which she receives therapeutic injections  He has been reluctant to consider knee replacement surgery  He does exercise on a stationary bicycle for 1 hour 4 days a week  Patient is due for Cologuard testing in 2022  Review of Systems   Review of Systems   Constitutional: Negative  HENT: Negative  Respiratory: Negative  Cardiovascular: Negative  Gastrointestinal: Negative  Genitourinary: Negative  Musculoskeletal: Positive for arthralgias  Skin: Negative  Neurological: Negative  Psychiatric/Behavioral: Negative  Active Problem List     Patient Active Problem List   Diagnosis    Hypertension    Esophageal reflux    Onychomycosis    Bursitis of heel, right    Lesion of mouth    Rupture of right quadriceps tendon    Obesity (BMI 30-39  9)    Chronic pain of both knees    Well adult exam    Conjunctivitis of left eye       Past Medical History:  Past Medical History:   Diagnosis Date    Acute meniscal tear of left knee     Arthritis     Carpal tunnel syndrome, unspecified upper limb     Hypercholesterolemia     Hypertension     Morbid obesity ([address] )        Past Surgical History:  Past Surgical History:   Procedure Laterality Date    ARTHROSCOPY KNEE      QUADRICEPS REPAIR         Family History:  Family History   Problem Relation Age of Onset    Atrial fibrillation Mother         CARDIAC PACEMAKER    Parkinsonism Mother     Bleeding Disorder Father         ACTIVE INTERNAL BLEEDING    Liver disease Father         LIVER DAMAGE       Social History:  Social History     Socioeconomic History    Marital status: Single     Spouse name: Not on file    Number of children: Not on file    Years of education: Not on file    Highest education level: Not on file   Occupational History    Not on file   Tobacco Use    Smoking status: Never Smoker    Smokeless tobacco: Never Used   Vaping Use    Vaping Use: Never used   Substance and Sexual Activity    Alcohol use: No    Drug use: No    Sexual activity: Yes   Other Topics Concern    Not on file   Social History Narrative    Not on file     Social Determinants of Health     Financial Resource Strain: Not on file   Food Insecurity: Not on file   Transportation Needs: Not on file   Physical Activity: Not on file   Stress: Not on file   Social Connections: Not on file   Intimate Partner Violence: Not on file   Housing Stability: Not on file       Objective     Vitals: 02/09/22 0722   BP: 132/78   Pulse: 61   Resp: 18   Temp: 98 °F (36 7 °C)   SpO2: 96%     Wt Readings from Last 3 Encounters:   02/09/22 130 kg (287 lb 2 oz)   08/04/21 126 kg (277 lb 6 oz)   03/08/21 126 kg (278 lb 9 oz)       Physical Exam  Constitutional:       General: He is not in acute distress  Appearance: Normal appearance  He is not ill-appearing  HENT:      Head: Normocephalic and atraumatic  Eyes:      General:         Right eye: No discharge  Left eye: No discharge  Extraocular Movements: Extraocular movements intact  Conjunctiva/sclera: Conjunctivae normal       Pupils: Pupils are equal, round, and reactive to light  Neck:      Vascular: No carotid bruit  Cardiovascular:      Rate and Rhythm: Normal rate and regular rhythm  Heart sounds: Normal heart sounds  No murmur heard  Pulmonary:      Effort: Pulmonary effort is normal       Breath sounds: Normal breath sounds  No wheezing, rhonchi or rales  Abdominal:      General: Abdomen is flat  Bowel sounds are normal  There is no distension  Palpations: Abdomen is soft  Tenderness: There is no abdominal tenderness  There is no guarding or rebound  Musculoskeletal:      Right lower leg: No edema  Left lower leg: No edema  Lymphadenopathy:      Cervical: No cervical adenopathy  Skin:     Findings: No rash  Neurological:      General: No focal deficit present  Mental Status: He is alert and oriented to person, place, and time  Cranial Nerves: No cranial nerve deficit  Psychiatric:         Mood and Affect: Mood normal          Behavior: Behavior normal          Thought Content:  Thought content normal          Judgment: Judgment normal          Pertinent Laboratory/Diagnostic Studies:  Lab Results   Component Value Date    GLUCOSE 84 10/17/2013    BUN 11 01/23/2021    CREATININE 0 99 01/23/2021    CALCIUM 9 4 01/23/2021     02/18/2017    K 4 7 01/23/2021    CO2 31 01/23/2021    CL 102 01/23/2021     Lab Results   Component Value Date    ALT 28 01/23/2021    AST 22 01/23/2021    ALKPHOS 53 01/23/2021    BILITOT 0 6 02/18/2017       Lab Results   Component Value Date    WBC 6 5 09/14/2019    HGB 14 7 09/14/2019    HCT 44 2 09/14/2019    MCV 89 3 09/14/2019     09/14/2019       Lab Results   Component Value Date    TSH 2 21 01/23/2021       Lab Results   Component Value Date    CHOL 175 10/17/2013     Lab Results   Component Value Date    TRIG 83 01/23/2021     Lab Results   Component Value Date    HDL 41 01/23/2021     Lab Results   Component Value Date    LDLCALC 125 (H) 01/23/2021     No results found for: HGBA1C    Results for orders placed or performed in visit on 02/06/21   PSA, Total Screen   Result Value Ref Range    Prostate Specific Antigen Total 0 8 < OR = 4 0 ng/mL       Orders Placed This Encounter   Procedures    CBC    Comprehensive metabolic panel    Lipid panel    TSH, 3rd generation    PSA, Total Screen       ALLERGIES:  No Known Allergies    Current Medications     Current Outpatient Medications   Medication Sig Dispense Refill    valsartan-hydrochlorothiazide (DIOVAN-HCT) 80-12 5 MG per tablet TAKE 1 TABLET BY MOUTH EVERY DAY 90 tablet 1     No current facility-administered medications for this visit           Health Maintenance     Health Maintenance   Topic Date Due    Hepatitis C Screening  Never done    HIV Screening  Never done    DTaP,Tdap,and Td Vaccines (1 - Tdap) Never done    Annual Physical  01/29/2022    BMI: Followup Plan  03/08/2022    Colorectal Cancer Screening  04/09/2022    Depression Screening  02/09/2023    BMI: Adult  02/09/2023    Influenza Vaccine  Completed    COVID-19 Vaccine  Completed    Pneumococcal Vaccine: Pediatrics (0 to 5 Years) and At-Risk Patients (6 to 59 Years)  Aged Out    HIB Vaccine  Aged Out    Hepatitis B Vaccine  Aged Out    IPV Vaccine  Aged Out    Hepatitis A Vaccine  Aged Out    Meningococcal ACWY Vaccine  Aged Out    HPV Vaccine  Aged Out     Immunization History   Administered Date(s) Administered    COVID-19 MODERNA VACC 0 5 ML IM 01/21/2021, 02/19/2021, 11/11/2021    INFLUENZA 10/23/2013, 10/01/2018    Influenza, injectable, quadrivalent, preservative free 0 5 mL 09/19/2020    Influenza, recombinant, quadrivalent,injectable, preservative free 11/05/2019, 10/09/2021    Influenza, seasonal, injectable 10/04/2016    Influenza, seasonal, injectable, preservative free 37/10/4629       Donny Calderon MD

## 2022-02-09 NOTE — ASSESSMENT & PLAN NOTE
Well adult  Overall the patient appears to be in stable health  His COVID vaccination is up-to-date  Will call after April of 2022 to perform Cologuard testing for colon cancer screening  He will obtain blood work as ordered for further evaluation    We will make further recommendations pending results of test   Patient will see if his insurance covers Shingrix vaccination

## 2022-02-09 NOTE — ASSESSMENT & PLAN NOTE
Hypertension    Patient blood pressure is stable at this time he will continue current regimen of medication

## 2022-02-17 NOTE — PROGRESS NOTES
Daily Note     Today's date: 2018  Patient name: Sandra Bedolla  : 1970  MRN: [id number]  Referring provider: Blair Saenz PA-C  Dx:   Encounter Diagnosis     ICD-10-CM    1  Acute pain of right knee M25 561                   Subjective: Patient reports some soreness/stiffness when sitting for a prolonged time  Objective: See treatment diary below    Precautions: quad tendon repair 5/15/18; open brace to 90 on 18, open brace fully 18    Daily Treatment Diary     Manual              PRN                                                                     Exercise Diary         Heel slides 10 w/o strap, 10 w/ strap 10 w/o strap, 20 w/ strap 10 w/o strap, 20 w/ strap 10  W/o  Strap,20 w/  strap 10 w/o strap 20 w/ strap 10 w/o strap 20 w/ strap       Quad sets 5"x10 5"x15 5"x20 5"x20 5"x20 5"x20       SAQ 2x10 3x10 3x10 1# 3x10  1# 3x10 1# 3x10 2#       Standing heel raises 2x10 2x10 2x10 3x10 3x10 3x10       Squats 2x10 2x10 2x10 3x10 3x10 3x10       Step ups 4" 2x10 6" 2x10 6" 2x10 6"  3x10 6" 3x10 10" 3x10       Bike NV 5' 8' 10' 10' 8'       Prone quad stretch NV unable 3x20" 3x20" 20"x3 3x30"       Bridging NV 2x10 2x10 3x10 3x10 2x10 B/L with knee ext       SLR  x8 AA 2x10 3x10 3x10 3x10 1#       Standing HS curls  NV 2x10 2x15 2x15 2x15       SLS  3x30" 3x30" 3x30" 3x30" 3x30"       Lunges   x10 2x10 2x10 2x10       Side lying hip ABD   2x10 3x10 3x10 3x10 1#       Hamstring stretch      2x30"                                                                            Modalities              PRN                                             Assessment: Called pt's MD and left message to inquire about pt being discharged from sleeping in the brace  Waiting for MD response  Pt tolerating therex well without issues  Good quad activation and strength is progressing well  Plan: Continue therapy as tolerated per plan of care  Patients wife would like to speak to the nurse. Kate states that Henrique had spoken to Dr. Kumar before about having a Tonsillectomy and would like to know what the next steps are to get this done. Please call Kate at [phone]

## 2022-04-26 ENCOUNTER — CLINICAL SUPPORT (OUTPATIENT)
Dept: FAMILY MEDICINE CLINIC | Facility: CLINIC | Age: 52
End: 2022-04-26
Payer: COMMERCIAL

## 2022-04-26 VITALS — TEMPERATURE: 98 F

## 2022-04-26 DIAGNOSIS — Z23 ENCOUNTER FOR IMMUNIZATION: Primary | ICD-10-CM

## 2022-04-26 PROCEDURE — 90750 HZV VACC RECOMBINANT IM: CPT

## 2022-04-26 PROCEDURE — 90471 IMMUNIZATION ADMIN: CPT

## 2022-05-23 DIAGNOSIS — Z12.12 SCREENING FOR COLORECTAL CANCER: Primary | ICD-10-CM

## 2022-05-23 DIAGNOSIS — Z12.11 SCREENING FOR COLORECTAL CANCER: Primary | ICD-10-CM

## 2022-06-03 ENCOUNTER — OFFICE VISIT (OUTPATIENT)
Dept: GASTROENTEROLOGY | Facility: CLINIC | Age: 52
End: 2022-06-03
Payer: COMMERCIAL

## 2022-06-03 VITALS
HEIGHT: 73 IN | OXYGEN SATURATION: 98 % | DIASTOLIC BLOOD PRESSURE: 70 MMHG | WEIGHT: 263 LBS | SYSTOLIC BLOOD PRESSURE: 128 MMHG | BODY MASS INDEX: 34.85 KG/M2 | HEART RATE: 66 BPM | TEMPERATURE: 99 F | RESPIRATION RATE: 18 BRPM

## 2022-06-03 DIAGNOSIS — K21.9 GASTROESOPHAGEAL REFLUX DISEASE WITHOUT ESOPHAGITIS: Primary | ICD-10-CM

## 2022-06-03 DIAGNOSIS — K92.1 HEMATOCHEZIA: ICD-10-CM

## 2022-06-03 PROCEDURE — 3078F DIAST BP <80 MM HG: CPT | Performed by: INTERNAL MEDICINE

## 2022-06-03 PROCEDURE — 3008F BODY MASS INDEX DOCD: CPT | Performed by: INTERNAL MEDICINE

## 2022-06-03 PROCEDURE — 3074F SYST BP LT 130 MM HG: CPT | Performed by: INTERNAL MEDICINE

## 2022-06-03 PROCEDURE — 99204 OFFICE O/P NEW MOD 45 MIN: CPT | Performed by: INTERNAL MEDICINE

## 2022-06-03 PROCEDURE — 1036F TOBACCO NON-USER: CPT | Performed by: INTERNAL MEDICINE

## 2022-06-03 NOTE — PROGRESS NOTES
[facility] - Outpatient Consultation  Abbie Teagan 46 y o  male MRN: [id number]  Encounter: [id number]          ASSESSMENT AND PLAN:      1  Hematochezia  78-year-old male here for evaluation of hematochezia  He has been experiencing hematochezia for the last few days  He has no diarrhea or constipation  Last Cologuard in 2019 was negative  His never had colonoscopy  Hematochezia could be secondary to internal hemorrhoids but need to rule out underlying advanced adenoma or malignancy  Colonoscopy for further eval  High-fiber diet    2  Gastroesophageal reflux disease without esophagitis    We reviewed reflux precautions  His symptoms are well controlled with dietary modification  Handout provided for reflux precaution  Pepcid as needed  He has second-degree relative with esophageal cancer  Because of this reason I recommend EGD to assess for esophagitis and Koenig's esophagus      ______________________________________________________________________    HPI:  78-year-old male with hypertension, obesity here for evaluation of hematochezia and management of reflux symptoms  He reports intermittent blood in the stool for the last few days  He denies diarrhea or constipation  No known family history of colon malignancy  He had negative Cologuard in 2019  He reports mild reflux symptoms  Currently not on any PPI  His family history of esophageal cancer      REVIEW OF SYSTEMS:    CONSTITUTIONAL: Denies any fever, chills, rigors, and weight loss  HEENT: No earache or tinnitus  Denies hearing loss or visual disturbances  CARDIOVASCULAR: No chest pain or palpitations  RESPIRATORY: Denies any cough, hemoptysis, shortness of breath or dyspnea on exertion  GASTROINTESTINAL: As noted in the History of Present Illness  GENITOURINARY: No problems with urination  Denies any hematuria or dysuria  NEUROLOGIC: No dizziness or vertigo, denies headaches     MUSCULOSKELETAL: Denies any muscle or joint pain  SKIN: Denies skin rashes or itching  ENDOCRINE: Denies excessive thirst  Denies intolerance to heat or cold  PSYCHOSOCIAL: Denies depression or anxiety  Denies any recent memory loss  Historical Information   Past Medical History:   Diagnosis Date    Acute meniscal tear of left knee     Arthritis     Carpal tunnel syndrome, unspecified upper limb     Hypercholesterolemia     Hypertension     Morbid obesity ([address] )      Past Surgical History:   Procedure Laterality Date    ARTHROSCOPY KNEE      QUADRICEPS REPAIR       Social History   Social History     Substance and Sexual Activity   Alcohol Use No     Social History     Substance and Sexual Activity   Drug Use No     Social History     Tobacco Use   Smoking Status Never Smoker   Smokeless Tobacco Never Used     Family History   Problem Relation Age of Onset    Atrial fibrillation Mother         CARDIAC PACEMAKER    Parkinsonism Mother     Bleeding Disorder Father         ACTIVE INTERNAL BLEEDING    Liver disease Father         LIVER DAMAGE       Meds/Allergies       Current Outpatient Medications:     valsartan-hydrochlorothiazide (DIOVAN-HCT) 80-12 5 MG per tablet    No Known Allergies        Objective     Blood pressure 128/70, pulse 66, temperature 99 °F (37 2 °C), temperature source Tympanic, resp  rate 18, height 6' 1" (1 854 m), weight 119 kg (263 lb), SpO2 98 %  Body mass index is 34 7 kg/m²  PHYSICAL EXAM:      General Appearance:   Alert, cooperative, no distress   HEENT:   Normocephalic, atraumatic, anicteric      Neck:  Supple, symmetrical, trachea midline   Lungs:   Clear to auscultation bilaterally; no rales, rhonchi or wheezing; respirations unlabored    Heart[de-identified]   Regular rate and rhythm; no murmur, rub, or gallop     Abdomen:   Soft, non-tender, non-distended; normal bowel sounds; no masses, no organomegaly    Genitalia:   Deferred    Rectal:   Deferred    Extremities:  No cyanosis, clubbing or edema    Pulses:  2+ and symmetric    Skin:  No jaundice, rashes, or lesions    Lymph nodes:  No palpable cervical lymphadenopathy        Lab Results:   No visits with results within 1 Day(s) from this visit  Latest known visit with results is:   Orders Only on 02/06/2021   Component Date Value    Prostate Specific Antige* 02/06/2021 0 8          Radiology Results:   No results found

## 2022-06-03 NOTE — PROGRESS NOTES
[facility] - Outpatient Consultation  Laith Ragsdale 46 y o  male MRN: [id number]  Encounter: [id number]          ASSESSMENT AND PLAN:      1  Gastroesophageal reflux disease without esophagitis  ***  - EGD; Future    2  Hematochezia  ***  - Colonoscopy; Future    ______________________________________________________________________    HPI:  ***      REVIEW OF SYSTEMS:    CONSTITUTIONAL: Denies any fever, chills, rigors, and weight loss  HEENT: No earache or tinnitus  Denies hearing loss or visual disturbances  CARDIOVASCULAR: No chest pain or palpitations  RESPIRATORY: Denies any cough, hemoptysis, shortness of breath or dyspnea on exertion  GASTROINTESTINAL: As noted in the History of Present Illness  GENITOURINARY: No problems with urination  Denies any hematuria or dysuria  NEUROLOGIC: No dizziness or vertigo, denies headaches  MUSCULOSKELETAL: Denies any muscle or joint pain  SKIN: Denies skin rashes or itching  ENDOCRINE: Denies excessive thirst  Denies intolerance to heat or cold  PSYCHOSOCIAL: Denies depression or anxiety  Denies any recent memory loss         Historical Information   Past Medical History:   Diagnosis Date    Acute meniscal tear of left knee     Arthritis     Carpal tunnel syndrome, unspecified upper limb     Hypercholesterolemia     Hypertension     Morbid obesity ([address] )      Past Surgical History:   Procedure Laterality Date    ARTHROSCOPY KNEE      QUADRICEPS REPAIR       Social History   Social History     Substance and Sexual Activity   Alcohol Use No     Social History     Substance and Sexual Activity   Drug Use No     Social History     Tobacco Use   Smoking Status Never Smoker   Smokeless Tobacco Never Used     Family History   Problem Relation Age of Onset    Atrial fibrillation Mother         CARDIAC PACEMAKER    Parkinsonism Mother     Bleeding Disorder Father         ACTIVE INTERNAL BLEEDING    Liver disease Father LIVER DAMAGE       Meds/Allergies       Current Outpatient Medications:     valsartan-hydrochlorothiazide (DIOVAN-HCT) 80-12 5 MG per tablet    No Known Allergies        Objective     Blood pressure 128/70, pulse 66, temperature 99 °F (37 2 °C), temperature source Tympanic, resp  rate 18, height 6' 1" (1 854 m), weight 119 kg (263 lb), SpO2 98 %  Body mass index is 34 7 kg/m²  PHYSICAL EXAM:      General Appearance:   Alert, cooperative, no distress   HEENT:   Normocephalic, atraumatic, anicteric      Neck:  Supple, symmetrical, trachea midline   Lungs:   Clear to auscultation bilaterally; no rales, rhonchi or wheezing; respirations unlabored    Heart[de-identified]   Regular rate and rhythm; no murmur, rub, or gallop  Abdomen:   Soft, non-tender, non-distended; normal bowel sounds; no masses, no organomegaly    Genitalia:   Deferred    Rectal:   Deferred    Extremities:  No cyanosis, clubbing or edema    Pulses:  2+ and symmetric    Skin:  No jaundice, rashes, or lesions    Lymph nodes:  No palpable cervical lymphadenopathy        Lab Results:   No visits with results within 1 Day(s) from this visit  Latest known visit with results is:   Orders Only on 02/06/2021   Component Date Value    Prostate Specific Antige* 02/06/2021 0 8          Radiology Results:   No results found

## 2022-06-07 ENCOUNTER — TELEPHONE (OUTPATIENT)
Dept: GASTROENTEROLOGY | Facility: CLINIC | Age: 52
End: 2022-06-07

## 2022-06-07 NOTE — TELEPHONE ENCOUNTER
LMOM for pt to callback directly to schedule EGD/colon with Dr Nghia Mohamud  Offered Thursday, 7/14, and Monday, 7/18, at [facility] with Dr Nghia Mohamud

## 2022-06-20 NOTE — TELEPHONE ENCOUNTER
Att TC to pt - female answering the call stated that pt is in [facility]  Will callback in a week

## 2022-06-28 NOTE — TELEPHONE ENCOUNTER
TC to pt to schedule EGD/colon  Pt states that he has contacted another office to schedule procedures because he did not care for Dr Lupe Antoine  Pt agreed to have this scheduling request cancelled

## 2022-08-07 LAB
ALBUMIN SERPL-MCNC: 4.2 G/DL (ref 3.6–5.1)
ALBUMIN/GLOB SERPL: 1.8 (CALC) (ref 1–2.5)
ALP SERPL-CCNC: 62 U/L (ref 35–144)
ALT SERPL-CCNC: 16 U/L (ref 9–46)
AST SERPL-CCNC: 19 U/L (ref 10–35)
BASOPHILS # BLD AUTO: 50 CELLS/UL (ref 0–200)
BASOPHILS NFR BLD AUTO: 1 %
BILIRUB SERPL-MCNC: 0.9 MG/DL (ref 0.2–1.2)
BUN SERPL-MCNC: 12 MG/DL (ref 7–25)
BUN/CREAT SERPL: NORMAL (CALC) (ref 6–22)
CALCIUM SERPL-MCNC: 9.5 MG/DL (ref 8.6–10.3)
CHLORIDE SERPL-SCNC: 102 MMOL/L (ref 98–110)
CHOLEST SERPL-MCNC: 173 MG/DL
CHOLEST/HDLC SERPL: 3.8 (CALC)
CO2 SERPL-SCNC: 30 MMOL/L (ref 20–32)
CREAT SERPL-MCNC: 1.05 MG/DL (ref 0.7–1.3)
EOSINOPHIL # BLD AUTO: 100 CELLS/UL (ref 15–500)
EOSINOPHIL NFR BLD AUTO: 2 %
ERYTHROCYTE [DISTWIDTH] IN BLOOD BY AUTOMATED COUNT: 12.2 % (ref 11–15)
GFR/BSA.PRED SERPLBLD CYS-BASED-ARV: 85 ML/MIN/1.73M2
GLOBULIN SER CALC-MCNC: 2.4 G/DL (CALC) (ref 1.9–3.7)
GLUCOSE SERPL-MCNC: 96 MG/DL (ref 65–99)
HCT VFR BLD AUTO: 46.4 % (ref 38.5–50)
HDLC SERPL-MCNC: 46 MG/DL
HGB BLD-MCNC: 14.9 G/DL (ref 13.2–17.1)
LDLC SERPL CALC-MCNC: 109 MG/DL (CALC)
LYMPHOCYTES # BLD AUTO: 1670 CELLS/UL (ref 850–3900)
LYMPHOCYTES NFR BLD AUTO: 33.4 %
MCH RBC QN AUTO: 29.4 PG (ref 27–33)
MCHC RBC AUTO-ENTMCNC: 32.1 G/DL (ref 32–36)
MCV RBC AUTO: 91.5 FL (ref 80–100)
MONOCYTES # BLD AUTO: 445 CELLS/UL (ref 200–950)
MONOCYTES NFR BLD AUTO: 8.9 %
NEUTROPHILS # BLD AUTO: 2735 CELLS/UL (ref 1500–7800)
NEUTROPHILS NFR BLD AUTO: 54.7 %
NONHDLC SERPL-MCNC: 127 MG/DL (CALC)
PLATELET # BLD AUTO: 276 THOUSAND/UL (ref 140–400)
PMV BLD REES-ECKER: 10.1 FL (ref 7.5–12.5)
POTASSIUM SERPL-SCNC: 4.7 MMOL/L (ref 3.5–5.3)
PROT SERPL-MCNC: 6.6 G/DL (ref 6.1–8.1)
PSA SERPL-MCNC: 0.72 NG/ML
RBC # BLD AUTO: 5.07 MILLION/UL (ref 4.2–5.8)
SODIUM SERPL-SCNC: 140 MMOL/L (ref 135–146)
TRIGL SERPL-MCNC: 90 MG/DL
TSH SERPL-ACNC: 2.29 MIU/L (ref 0.4–4.5)
WBC # BLD AUTO: 5 THOUSAND/UL (ref 3.8–10.8)

## 2022-08-11 ENCOUNTER — RA CDI HCC (OUTPATIENT)
Dept: OTHER | Facility: HOSPITAL | Age: 52
End: 2022-08-11

## 2022-08-11 NOTE — PROGRESS NOTES
[facility]  coding opportunities       Chart reviewed, no opportunity found: CHART REVIEWED, NO OPPORTUNITY FOUND        Patients Insurance        Commercial Insurance: [employer]

## 2022-08-18 ENCOUNTER — OFFICE VISIT (OUTPATIENT)
Dept: FAMILY MEDICINE CLINIC | Facility: CLINIC | Age: 52
End: 2022-08-18
Payer: COMMERCIAL

## 2022-08-18 VITALS
RESPIRATION RATE: 18 BRPM | WEIGHT: 261.38 LBS | DIASTOLIC BLOOD PRESSURE: 70 MMHG | BODY MASS INDEX: 34.64 KG/M2 | SYSTOLIC BLOOD PRESSURE: 110 MMHG | HEART RATE: 57 BPM | HEIGHT: 73 IN | TEMPERATURE: 97.8 F | OXYGEN SATURATION: 98 %

## 2022-08-18 DIAGNOSIS — Z23 ENCOUNTER FOR IMMUNIZATION: Primary | ICD-10-CM

## 2022-08-18 DIAGNOSIS — Z12.11 COLON CANCER SCREENING: ICD-10-CM

## 2022-08-18 DIAGNOSIS — I10 PRIMARY HYPERTENSION: ICD-10-CM

## 2022-08-18 DIAGNOSIS — Z00.00 WELL ADULT EXAM: ICD-10-CM

## 2022-08-18 PROCEDURE — 90471 IMMUNIZATION ADMIN: CPT

## 2022-08-18 PROCEDURE — 3725F SCREEN DEPRESSION PERFORMED: CPT | Performed by: FAMILY MEDICINE

## 2022-08-18 PROCEDURE — 3078F DIAST BP <80 MM HG: CPT | Performed by: FAMILY MEDICINE

## 2022-08-18 PROCEDURE — 3074F SYST BP LT 130 MM HG: CPT | Performed by: FAMILY MEDICINE

## 2022-08-18 PROCEDURE — 99396 PREV VISIT EST AGE 40-64: CPT | Performed by: FAMILY MEDICINE

## 2022-08-18 PROCEDURE — 90750 HZV VACC RECOMBINANT IM: CPT

## 2022-08-18 NOTE — ASSESSMENT & PLAN NOTE
Well adult  Overall the patient appears to be in stable health  He was referred for initial screening colonoscopy  He received his 2nd Shingrix vaccination today  He will schedule future influenza vaccine and COVID vaccination later this year

## 2022-08-18 NOTE — PROGRESS NOTES
FAMILY PRACTICE OFFICE VISIT       NAME: Bob Martínez  AGE: 46 y o  SEX: male       : 1970        MRN: [id number]    DATE: 2022  TIME: 9:35 AM    Assessment and Plan     Problem List Items Addressed This Visit        Cardiovascular and Mediastinum    Hypertension     Hypertension  Patient blood pressure is stable at this time he will continue with current regimen of medications  Other    Well adult exam     Well adult  Overall the patient appears to be in stable health  He was referred for initial screening colonoscopy  He received his 2nd Shingrix vaccination today  He will schedule future influenza vaccine and COVID vaccination later this year  Other Visit Diagnoses     Encounter for immunization    -  Primary    Relevant Orders    Zoster Vaccine Recombinant IM (Completed)    Colon cancer screening        Relevant Orders    Ambulatory Referral to Colorectal Surgery              Chief Complaint     Chief Complaint   Patient presents with    Follow-up     6 month  / 2nd shingrix        History of Present Illness     Patient in the office for annual wellness exam   He denies any recent illness  He continues to receive therapeutic injections to his right knee due to chronic pain from degenerative joint disease  He does exercise on a stationary bicycle for an hour 3 days a week  He has lost approximately 25 lb in the last 4 months with diet and exercise  Patient did report an episode of 1-2 days of rectal bleeding and feeling a small nodule along perirectal area  He was seen by gastroenterologist however he did not address what was suspected to be external hemorrhoid  At this point patient feels symptoms have resolved however he has never had a colonoscopy  Review of Systems   Review of Systems   Constitutional: Negative  HENT: Negative  Eyes: Negative  Respiratory: Negative  Cardiovascular: Negative      Gastrointestinal: Positive for blood in stool    Genitourinary: Negative  Musculoskeletal: Positive for arthralgias and gait problem  Skin: Negative  Psychiatric/Behavioral: Negative  Active Problem List     Patient Active Problem List   Diagnosis    Hypertension    Esophageal reflux    Onychomycosis    Bursitis of heel, right    Lesion of mouth    Rupture of right quadriceps tendon    Obesity (BMI 30-39  9)    Chronic pain of both knees    Well adult exam    Conjunctivitis of left eye    Hematochezia       Past Medical History:  Past Medical History:   Diagnosis Date    Acute meniscal tear of left knee     Arthritis     Carpal tunnel syndrome, unspecified upper limb     Hypercholesterolemia     Hypertension     Morbid obesity ([address] )        Past Surgical History:  Past Surgical History:   Procedure Laterality Date    ARTHROSCOPY KNEE      QUADRICEPS REPAIR         Family History:  Family History   Problem Relation Age of Onset    Atrial fibrillation Mother         CARDIAC PACEMAKER    Parkinsonism Mother     Bleeding Disorder Father         ACTIVE INTERNAL BLEEDING    Liver disease Father         LIVER DAMAGE       Social History:  Social History     Socioeconomic History    Marital status: Single     Spouse name: Not on file    Number of children: Not on file    Years of education: Not on file    Highest education level: Not on file   Occupational History    Not on file   Tobacco Use    Smoking status: Never Smoker    Smokeless tobacco: Never Used   Vaping Use    Vaping Use: Never used   Substance and Sexual Activity    Alcohol use: No    Drug use: No    Sexual activity: Yes   Other Topics Concern    Not on file   Social History Narrative    Not on file     Social Determinants of Health     Financial Resource Strain: Not on file   Food Insecurity: Not on file   Transportation Needs: Not on file   Physical Activity: Not on file   Stress: Not on file   Social Connections: Not on file   Intimate Partner Violence: Not on file   Housing Stability: Not on file       Objective     Vitals:    08/18/22 0725   BP: 110/70   Pulse: 57   Resp: 18   Temp: 97 8 °F (36 6 °C)   SpO2: 98%     Wt Readings from Last 3 Encounters:   08/18/22 119 kg (261 lb 6 oz)   06/03/22 119 kg (263 lb)   02/09/22 130 kg (287 lb 2 oz)       Physical Exam  Constitutional:       General: He is not in acute distress  Appearance: Normal appearance  He is not ill-appearing  HENT:      Head: Normocephalic and atraumatic  Eyes:      General:         Right eye: No discharge  Left eye: No discharge  Extraocular Movements: Extraocular movements intact  Conjunctiva/sclera: Conjunctivae normal       Pupils: Pupils are equal, round, and reactive to light  Neck:      Vascular: No carotid bruit  Cardiovascular:      Rate and Rhythm: Normal rate and regular rhythm  Heart sounds: Normal heart sounds  No murmur heard  Pulmonary:      Effort: Pulmonary effort is normal       Breath sounds: Normal breath sounds  No wheezing, rhonchi or rales  Abdominal:      General: Abdomen is flat  Bowel sounds are normal  There is no distension  Palpations: Abdomen is soft  Tenderness: There is no abdominal tenderness  There is no guarding or rebound  Musculoskeletal:      Right lower leg: No edema  Left lower leg: No edema  Lymphadenopathy:      Cervical: No cervical adenopathy  Skin:     Findings: No rash  Neurological:      General: No focal deficit present  Mental Status: He is alert and oriented to person, place, and time  Cranial Nerves: No cranial nerve deficit  Psychiatric:         Mood and Affect: Mood normal          Behavior: Behavior normal          Thought Content:  Thought content normal          Judgment: Judgment normal          Pertinent Laboratory/Diagnostic Studies:  Lab Results   Component Value Date    GLUCOSE 84 10/17/2013    BUN 12 08/06/2022    CREATININE 1 05 08/06/2022    CALCIUM 9 5 08/06/2022     02/18/2017    K 4 7 08/06/2022    CO2 30 08/06/2022     08/06/2022     Lab Results   Component Value Date    ALT 16 08/06/2022    AST 19 08/06/2022    ALKPHOS 62 08/06/2022    BILITOT 0 6 02/18/2017       Lab Results   Component Value Date    WBC 5 0 08/06/2022    HGB 14 9 08/06/2022    HCT 46 4 08/06/2022    MCV 91 5 08/06/2022     08/06/2022       Lab Results   Component Value Date    TSH 2 29 08/06/2022       Lab Results   Component Value Date    CHOL 175 10/17/2013     Lab Results   Component Value Date    TRIG 90 08/06/2022     Lab Results   Component Value Date    HDL 46 08/06/2022     Lab Results   Component Value Date    LDLCALC 109 (H) 08/06/2022     No results found for: HGBA1C    Results for orders placed or performed in visit on 08/06/22   Lipid panel   Result Value Ref Range    Total Cholesterol 173 <200 mg/dL    HDL 46 > OR = 40 mg/dL    Triglycerides 90 <150 mg/dL    LDL Calculated 109 (H) mg/dL (calc)    Chol HDLC Ratio 3 8 <5 0 (calc)    Non-HDL Cholesterol 127 <130 mg/dL (calc)   Comprehensive metabolic panel   Result Value Ref Range    Glucose, Random 96 65 - 99 mg/dL    BUN 12 7 - 25 mg/dL    Creatinine 1 05 0 70 - 1 30 mg/dL    eGFR 85 > OR = 60 mL/min/1 73m2    SL AMB BUN/CREATININE RATIO NOT APPLICABLE 6 - 22 (calc)    Sodium 140 135 - 146 mmol/L    Potassium 4 7 3 5 - 5 3 mmol/L    Chloride 102 98 - 110 mmol/L    CO2 30 20 - 32 mmol/L    Calcium 9 5 8 6 - 10 3 mg/dL    Protein, Total 6 6 6 1 - 8 1 g/dL    Albumin 4 2 3 6 - 5 1 g/dL    Globulin 2 4 1 9 - 3 7 g/dL (calc)    Albumin/Globulin Ratio 1 8 1 0 - 2 5 (calc)    TOTAL BILIRUBIN 0 9 0 2 - 1 2 mg/dL    Alkaline Phosphatase 62 35 - 144 U/L    AST 19 10 - 35 U/L    ALT 16 9 - 46 U/L   CBC and differential   Result Value Ref Range    White Blood Cell Count 5 0 3 8 - 10 8 Thousand/uL    Red Blood Cell Count 5 07 4 20 - 5 80 Million/uL    Hemoglobin 14 9 13 2 - 17 1 g/dL    HCT 46 4 38 5 - 50 0 %    MCV 91 5 80 0 - 100 0 fL    MCH 29 4 27 0 - 33 0 pg    MCHC 32 1 32 0 - 36 0 g/dL    RDW 12 2 11 0 - 15 0 %    Platelet Count 748 423 - 400 Thousand/uL    SL AMB MPV 10 1 7 5 - 12 5 fL    Neutrophils (Absolute) 2,735 1,500 - 7,800 cells/uL    Lymphocytes (Absolute) 1,670 850 - 3,900 cells/uL    Monocytes (Absolute) 445 200 - 950 cells/uL    Eosinophils (Absolute) 100 15 - 500 cells/uL    Basophils ABS 50 0 - 200 cells/uL    Neutrophils 54 7 %    Lymphocytes 33 4 %    Monocytes 8 9 %    Eosinophils 2 0 %    Basophils PCT 1 0 %   TSH, 3rd generation   Result Value Ref Range    TSH 2 29 0 40 - 4 50 mIU/L   PSA, Total Screen   Result Value Ref Range    Prostate Specific Antigen Total 0 72 < OR = 4 00 ng/mL       Orders Placed This Encounter   Procedures    Zoster Vaccine Recombinant IM    Ambulatory Referral to Colorectal Surgery       ALLERGIES:  No Known Allergies    Current Medications     Current Outpatient Medications   Medication Sig Dispense Refill    valsartan-hydrochlorothiazide (DIOVAN-HCT) 80-12 5 MG per tablet TAKE 1 TABLET BY MOUTH EVERY DAY 90 tablet 0     No current facility-administered medications for this visit           Health Maintenance     Health Maintenance   Topic Date Due    Hepatitis C Screening  Never done    HIV Screening  Never done    DTaP,Tdap,and Td Vaccines (1 - Tdap) Never done    BMI: Followup Plan  03/08/2022    Colorectal Cancer Screening  04/09/2022    Influenza Vaccine (1) 09/01/2022    Annual Physical  02/09/2023    Depression Screening  08/18/2023    BMI: Adult  08/18/2023    COVID-19 Vaccine  Completed    Pneumococcal Vaccine: Pediatrics (0 to 5 Years) and At-Risk Patients (6 to 59 Years)  Aged Out    HIB Vaccine  Aged Out    Hepatitis B Vaccine  Aged Out    IPV Vaccine  Aged Out    Hepatitis A Vaccine  Aged Out    Meningococcal ACWY Vaccine  Aged Out    HPV Vaccine  Aged [employer] History   Administered Date(s) Administered    COVID-19 MODERNA VACC 0 5 ML IM 01/21/2021, 02/19/2021, 11/11/2021, 05/27/2022    INFLUENZA 10/23/2013, 10/01/2018    Influenza, injectable, quadrivalent, preservative free 0 5 mL 09/19/2020    Influenza, recombinant, quadrivalent,injectable, preservative free 11/05/2019, 10/09/2021    Influenza, seasonal, injectable 10/04/2016    Influenza, seasonal, injectable, preservative free 10/01/2018    Zoster Vaccine Recombinant 04/26/2022, 67/11/2817       Hayley Garcia MD

## 2022-09-28 DIAGNOSIS — I10 ESSENTIAL HYPERTENSION: ICD-10-CM

## 2022-09-28 RX ORDER — VALSARTAN AND HYDROCHLOROTHIAZIDE 80; 12.5 MG/1; MG/1
TABLET, FILM COATED ORAL
Qty: 90 TABLET | Refills: 0 | Status: SHIPPED | OUTPATIENT
Start: 2022-09-28

## 2022-10-12 PROBLEM — Z00.00 WELL ADULT EXAM: Status: RESOLVED | Noted: 2021-01-31 | Resolved: 2022-10-12

## 2022-10-12 PROBLEM — H10.9 CONJUNCTIVITIS OF LEFT EYE: Status: RESOLVED | Noted: 2021-03-08 | Resolved: 2022-10-12

## 2022-11-06 ENCOUNTER — ANESTHESIA (OUTPATIENT)
Dept: ANESTHESIOLOGY | Facility: HOSPITAL | Age: 52
End: 2022-11-06

## 2022-11-06 ENCOUNTER — ANESTHESIA EVENT (OUTPATIENT)
Dept: ANESTHESIOLOGY | Facility: HOSPITAL | Age: 52
End: 2022-11-06

## 2022-11-07 ENCOUNTER — ANESTHESIA (OUTPATIENT)
Dept: GASTROENTEROLOGY | Facility: HOSPITAL | Age: 52
End: 2022-11-07

## 2022-11-07 ENCOUNTER — ANESTHESIA EVENT (OUTPATIENT)
Dept: GASTROENTEROLOGY | Facility: HOSPITAL | Age: 52
End: 2022-11-07

## 2022-11-07 ENCOUNTER — HOSPITAL ENCOUNTER (OUTPATIENT)
Dept: GASTROENTEROLOGY | Facility: HOSPITAL | Age: 52
Setting detail: OUTPATIENT SURGERY
Discharge: HOME | End: 2022-11-07
Attending: COLON & RECTAL SURGERY

## 2022-11-07 VITALS
DIASTOLIC BLOOD PRESSURE: 56 MMHG | HEART RATE: 76 BPM | TEMPERATURE: 0.9 F | OXYGEN SATURATION: 97 % | SYSTOLIC BLOOD PRESSURE: 108 MMHG | RESPIRATION RATE: 16 BRPM

## 2022-11-07 DIAGNOSIS — Z12.11 ENCOUNTER FOR SCREENING COLONOSCOPY: ICD-10-CM

## 2022-11-07 RX ORDER — LIDOCAINE HYDROCHLORIDE 10 MG/ML
INJECTION, SOLUTION EPIDURAL; INFILTRATION; INTRACAUDAL; PERINEURAL AS NEEDED
Status: DISCONTINUED | OUTPATIENT
Start: 2022-11-07 | End: 2022-11-07

## 2022-11-07 RX ORDER — PROPOFOL 10 MG/ML
INJECTION, EMULSION INTRAVENOUS AS NEEDED
Status: DISCONTINUED | OUTPATIENT
Start: 2022-11-07 | End: 2022-11-07

## 2022-11-07 RX ORDER — SODIUM CHLORIDE 9 MG/ML
INJECTION, SOLUTION INTRAVENOUS CONTINUOUS PRN
Status: DISCONTINUED | OUTPATIENT
Start: 2022-11-07 | End: 2022-11-07

## 2022-11-07 RX ADMIN — PROPOFOL 20 MG: 10 INJECTION, EMULSION INTRAVENOUS at 10:09

## 2022-11-07 RX ADMIN — PROPOFOL 20 MG: 10 INJECTION, EMULSION INTRAVENOUS at 10:11

## 2022-11-07 RX ADMIN — PROPOFOL 20 MG: 10 INJECTION, EMULSION INTRAVENOUS at 10:15

## 2022-11-07 RX ADMIN — PROPOFOL 20 MG: 10 INJECTION, EMULSION INTRAVENOUS at 10:06

## 2022-11-07 RX ADMIN — PROPOFOL 20 MG: 10 INJECTION, EMULSION INTRAVENOUS at 10:10

## 2022-11-07 RX ADMIN — PROPOFOL 20 MG: 10 INJECTION, EMULSION INTRAVENOUS at 10:08

## 2022-11-07 RX ADMIN — PROPOFOL 20 MG: 10 INJECTION, EMULSION INTRAVENOUS at 10:07

## 2022-11-07 RX ADMIN — PROPOFOL 20 MG: 10 INJECTION, EMULSION INTRAVENOUS at 10:16

## 2022-11-07 RX ADMIN — PROPOFOL 30 MG: 10 INJECTION, EMULSION INTRAVENOUS at 10:05

## 2022-11-07 RX ADMIN — SODIUM CHLORIDE: 0.9 INJECTION, SOLUTION INTRAVENOUS at 10:00

## 2022-11-07 RX ADMIN — PROPOFOL 100 MG: 10 INJECTION, EMULSION INTRAVENOUS at 10:04

## 2022-11-07 RX ADMIN — PROPOFOL 20 MG: 10 INJECTION, EMULSION INTRAVENOUS at 10:12

## 2022-11-07 RX ADMIN — PROPOFOL 20 MG: 10 INJECTION, EMULSION INTRAVENOUS at 10:13

## 2022-11-07 RX ADMIN — LIDOCAINE HYDROCHLORIDE 50 MG: 10 INJECTION, SOLUTION EPIDURAL; INFILTRATION; INTRACAUDAL; PERINEURAL at 10:04

## 2022-11-07 NOTE — ANESTHESIA PREPROCEDURE EVALUATION
Procedure:  COLONOSCOPY    Relevant Problems   ANESTHESIA (within normal limits)      CARDIO   (+) Hypertension      ENDO (within normal limits)      GI/HEPATIC   (+) Esophageal reflux      /RENAL (within normal limits)      HEMATOLOGY (within normal limits)      NEURO/PSYCH (within normal limits)      PULMONARY (within normal limits)      Other   (+) Hematochezia   (+) Obesity (BMI 30-39  9)      Lab Results   Component Value Date    WBC 5 0 08/06/2022    HGB 14 9 08/06/2022    HCT 46 4 08/06/2022    MCV 91 5 08/06/2022     08/06/2022     Lab Results   Component Value Date    SODIUM 140 08/06/2022    K 4 7 08/06/2022     08/06/2022    CO2 30 08/06/2022    BUN 12 08/06/2022    CREATININE 1 05 08/06/2022    GLUC 96 08/06/2022    CALCIUM 9 5 08/06/2022     No results found for: INR, PROTIME  No results found for: HGBA1C       Physical Exam    Airway    Mallampati score: II  TM Distance: >3 FB  Neck ROM: full     Dental   No notable dental hx     Cardiovascular  Cardiovascular exam normal    Pulmonary  Pulmonary exam normal     Other Findings        Anesthesia Plan  ASA Score- 2     Anesthesia Type- IV sedation with anesthesia with ASA Monitors  Additional Monitors:   Airway Plan:           Plan Factors-Exercise tolerance (METS): >4 METS  Chart reviewed  Existing labs reviewed  Patient summary reviewed  Induction- intravenous  Postoperative Plan-     Informed Consent- Anesthetic plan and risks discussed with patient  I personally reviewed this patient with the CRNA  Discussed and agreed on the Anesthesia Plan with the CRNA  Meg Treviño

## 2022-11-07 NOTE — ANESTHESIA PREPROCEDURE EVALUATION
Procedure:  PRE-OP ONLY    Relevant Problems   CARDIO   (+) Hypertension      GI/HEPATIC   (+) Esophageal reflux      Lab Results   Component Value Date    WBC 5 0 08/06/2022    HGB 14 9 08/06/2022    HCT 46 4 08/06/2022    MCV 91 5 08/06/2022     08/06/2022     Lab Results   Component Value Date    SODIUM 140 08/06/2022    K 4 7 08/06/2022     08/06/2022    CO2 30 08/06/2022    BUN 12 08/06/2022    CREATININE 1 05 08/06/2022    GLUC 96 08/06/2022    CALCIUM 9 5 08/06/2022     No results found for: INR, PROTIME  No results found for: HGBA1C            Anesthesia Plan  ASA Score- 2     Anesthesia Type- IV sedation with anesthesia with ASA Monitors  Additional Monitors:   Airway Plan:           Plan Factors-    Chart reviewed  Existing labs reviewed  Patient summary reviewed  Induction- intravenous      Postoperative Plan-     Informed Consent-

## 2022-11-07 NOTE — ANESTHESIA POSTPROCEDURE EVALUATION
Post-Op Assessment Note    CV Status:  Stable  Pain Score: 0    Pain management: adequate     Mental Status:  Sleepy   Hydration Status:  Euvolemic   PONV Controlled:  None   Airway Patency:  Patent      Post Op Vitals Reviewed: Yes      Staff: CRNA         No complications documented      BP   104/59   Temp   97 9F   Pulse  61   Resp   16   SpO2   96%

## 2022-11-07 NOTE — H&P
History and Physical   Colon and Rectal Surgery   Valley Bernheim Brosious 46 y o  male MRN: [id number]  Unit/Bed#:  Encounter: [id number]  11/07/22   @[facility]    No chief complaint on file  History of Present Illness   HPI:  Irlanda Buckner is a 46 y o  male who presents for screening colonoscopy      Historical Information   Past Medical History:   Diagnosis Date   • Acute meniscal tear of left knee    • Arthritis    • Carpal tunnel syndrome, unspecified upper limb    • Hypercholesterolemia    • Hypertension    • Morbid obesity ([address] )      Past Surgical History:   Procedure Laterality Date   • ARTHROSCOPY KNEE     • QUADRICEPS REPAIR         Meds/Allergies     (Not in a hospital admission)        Current Outpatient Medications:   •  valsartan-hydrochlorothiazide (DIOVAN-HCT) 80-12 5 MG per tablet, TAKE 1 TABLET BY MOUTH EVERY DAY, Disp: 90 tablet, Rfl: 0    No Known Allergies      Social History   Social History     Substance and Sexual Activity   Alcohol Use No     Social History     Substance and Sexual Activity   Drug Use No     Social History     Tobacco Use   Smoking Status Never Smoker   Smokeless Tobacco Never Used         Family History:   Family History   Problem Relation Age of Onset   • Atrial fibrillation Mother         CARDIAC PACEMAKER   • Parkinsonism Mother    • Bleeding Disorder Father         ACTIVE INTERNAL BLEEDING   • Liver disease Father         LIVER DAMAGE         Objective     Current Vitals:      No intake or output data in the 24 hours ending 11/07/22 0939    Physical Exam:  General:  Resting comfortably in bed   Eyes:Sclera anicteric  ENT: Trachea midline  Pulm:  Symmetric chest raise  No respiratory Distress  CV:  Regular on monitor  Abdomen:  Soft NT ND  Extremities:  No clubbing/ cyanosis/ edema    Lab Results: I have personally reviewed pertinent lab results  Imaging: I have personally reviewed pertinent reports          ASSESSMENT:  Irlanda Buckner is a 46 y o  male who presents for outpatient colonoscopy  PLAN:  For colonoscopy    Risks/ Benefits reviewed to include but not limited to anesthesia, bleeding, missed lesions, and colonoscopic perforation requiring surgery

## 2022-12-06 ENCOUNTER — OFFICE VISIT (OUTPATIENT)
Dept: URGENT CARE | Facility: CLINIC | Age: 52
End: 2022-12-06

## 2022-12-06 VITALS
HEART RATE: 68 BPM | OXYGEN SATURATION: 99 % | TEMPERATURE: 100.1 F | SYSTOLIC BLOOD PRESSURE: 140 MMHG | DIASTOLIC BLOOD PRESSURE: 82 MMHG | RESPIRATION RATE: 16 BRPM

## 2022-12-06 DIAGNOSIS — R05.1 ACUTE COUGH: Primary | ICD-10-CM

## 2022-12-06 RX ORDER — AZELASTINE 1 MG/ML
1 SPRAY, METERED NASAL 2 TIMES DAILY
Qty: 30 ML | Refills: 0 | Status: SHIPPED | OUTPATIENT
Start: 2022-12-06

## 2022-12-06 RX ORDER — BENZONATATE 200 MG/1
200 CAPSULE ORAL 3 TIMES DAILY PRN
Qty: 20 CAPSULE | Refills: 0 | Status: SHIPPED | OUTPATIENT
Start: 2022-12-06

## 2022-12-06 NOTE — PATIENT INSTRUCTIONS
No signs of bacterial infection on exam today  Symptoms present less than 24 hours  Normal exam today  Lungs are clear  COVID & Flu testing collected today  OTC medications & supportive care as directed  Follow up with PCP in 3-5 days  Proceed to ER if symptoms worsen

## 2022-12-06 NOTE — PROGRESS NOTES
[facility]        NAME: Mason Menjivar is a 46 y o  male  : 1970    MRN: [id number]  DATE: 2022  TIME: 6:37 PM    Assessment and Plan   Acute cough [R05 1]  1  Acute cough  Cov/Flu-Collected at [facility] or [facility]    benzonatate (TESSALON) 200 MG capsule    azelastine (ASTELIN) 0 1 % nasal spray            Patient Instructions     COVID & Flu testing collected  F/u on results  No signs of bacterial infection on exam today  Symptom present <24 hr   Tessalon PRN  Astelin  OTC meds & supportive care as directed  Follow up with PCP in 2-3 days  Proceed to ER if symptoms worsen  Chief Complaint     Chief Complaint   Patient presents with   • Cough     Pt reports cough since yesterday w/ slight sore throat that has since resolved  No home COVID tests  History of Present Illness     49-year-old male presents with complaint of cough x <1 day  Denies CP, SOB, sinus pressure, congestion, rhinorrhea, headache, rash and NVD  Denies sick contacts  Works from home  No OTC meds taken  States his throat was slightly sore yesterday evening but resolved  States the cough might be from the gas heat in his house  Vaccinated against COVID and flu  Review of Systems   Review of Systems   Constitutional: Negative for chills, fatigue and fever  HENT: Negative for congestion, ear discharge, ear pain, facial swelling, rhinorrhea, sinus pressure, sinus pain, sore throat and trouble swallowing  Eyes: Negative for photophobia, pain and visual disturbance  Respiratory: Positive for cough  Negative for chest tightness, shortness of breath and wheezing  Cardiovascular: Negative for chest pain, palpitations and leg swelling  Gastrointestinal: Negative for abdominal pain, diarrhea, nausea and vomiting  Genitourinary: Negative for dysuria, flank pain and hematuria  Musculoskeletal: Negative for arthralgias, back pain, myalgias and neck pain     Skin: Negative for pallor and wound    Neurological: Negative for dizziness, syncope, weakness, numbness and headaches  Psychiatric/Behavioral: Negative for confusion and sleep disturbance  All other systems reviewed and are negative  Current Medications       Current Outpatient Medications:   •  azelastine (ASTELIN) 0 1 % nasal spray, 1 spray into each nostril 2 (two) times a day Use in each nostril as directed, Disp: 30 mL, Rfl: 0  •  benzonatate (TESSALON) 200 MG capsule, Take 1 capsule (200 mg total) by mouth 3 (three) times a day as needed for cough, Disp: 20 capsule, Rfl: 0  •  valsartan-hydrochlorothiazide (DIOVAN-HCT) 80-12 5 MG per tablet, TAKE 1 TABLET BY MOUTH EVERY DAY, Disp: 90 tablet, Rfl: 0    Current Allergies     Allergies as of 12/06/2022   • (No Known Allergies)            The following portions of the patient's history were reviewed and updated as appropriate: allergies, current medications, past family history, past medical history, past social history, past surgical history and problem list      Past Medical History:   Diagnosis Date   • Acute meniscal tear of left knee    • Arthritis    • Carpal tunnel syndrome, unspecified upper limb    • Hypercholesterolemia    • Hypertension    • Morbid [address] [address] )        Past Surgical History:   Procedure Laterality Date   • ARTHROSCOPY KNEE     • QUADRICEPS REPAIR         Family History   Problem Relation Age of Onset   • Atrial fibrillation Mother         CARDIAC PACEMAKER   • Parkinsonism Mother    • Bleeding Disorder Father         ACTIVE INTERNAL BLEEDING   • Liver disease Father         LIVER DAMAGE         Medications have been verified  Objective   /82   Pulse 68   Temp 100 1 °F (37 8 °C)   Resp 16   SpO2 99%   No LMP for male patient  Physical Exam     Physical Exam  Vitals reviewed  Constitutional:       General: He is not in acute distress  Appearance: He is normal weight  He is not ill-appearing or toxic-appearing     HENT: Head: Normocephalic  Right Ear: Tympanic membrane normal  No middle ear effusion  Tympanic membrane is not erythematous or bulging  Left Ear: Tympanic membrane normal   No middle ear effusion  Tympanic membrane is not erythematous or bulging  Nose: Nose normal       Right Sinus: No maxillary sinus tenderness or frontal sinus tenderness  Left Sinus: No maxillary sinus tenderness or frontal sinus tenderness  Mouth/Throat:      Mouth: Mucous membranes are moist       Pharynx: Oropharynx is clear  Uvula midline  No oropharyngeal exudate, posterior oropharyngeal erythema or uvula swelling  Tonsils: No tonsillar exudate or tonsillar abscesses  Comments:   No erythema, swelling, exudate or PND  Eyes:      Extraocular Movements: Extraocular movements intact  Conjunctiva/sclera: Conjunctivae normal       Pupils: Pupils are equal, round, and reactive to light  Cardiovascular:      Rate and Rhythm: Normal rate and regular rhythm  Pulses: Normal pulses  Heart sounds: Normal heart sounds  Pulmonary:      Effort: Pulmonary effort is normal  No tachypnea, accessory muscle usage or respiratory distress  Breath sounds: Normal breath sounds and air entry  No decreased air movement  No decreased breath sounds, wheezing, rhonchi or rales  Comments:   CTAB-no wheezing or stridor  Respirations nonlabored  No conversational dyspnea   99 on room air  No cough on exam  Abdominal:      General: Bowel sounds are normal       Palpations: Abdomen is soft  Tenderness: There is no abdominal tenderness  Musculoskeletal:         General: Normal range of motion  Cervical back: Normal range of motion and neck supple  Lymphadenopathy:      Cervical: No cervical adenopathy  Skin:     General: Skin is warm and dry  Capillary Refill: Capillary refill takes less than 2 seconds  Neurological:      General: No focal deficit present  Mental Status: He is alert  Cranial Nerves: Cranial nerves are intact  No cranial nerve deficit  Sensory: Sensation is intact  Motor: Motor function is intact  Coordination: Coordination is intact  Deep Tendon Reflexes: Reflexes are normal and symmetric

## 2022-12-07 LAB
FLUAV RNA RESP QL NAA+PROBE: NEGATIVE
FLUBV RNA RESP QL NAA+PROBE: NEGATIVE
SARS-COV-2 RNA RESP QL NAA+PROBE: POSITIVE

## 2022-12-15 DIAGNOSIS — I10 ESSENTIAL HYPERTENSION: ICD-10-CM

## 2022-12-15 RX ORDER — VALSARTAN AND HYDROCHLOROTHIAZIDE 80; 12.5 MG/1; MG/1
TABLET, FILM COATED ORAL
Qty: 90 TABLET | Refills: 0 | Status: SHIPPED | OUTPATIENT
Start: 2022-12-15

## 2023-02-14 ENCOUNTER — RA CDI HCC (OUTPATIENT)
Dept: OTHER | Facility: HOSPITAL | Age: 53
End: 2023-02-14

## 2023-02-14 NOTE — PROGRESS NOTES
[facility]  coding opportunities       Chart reviewed, no opportunity found: CHART REVIEWED, NO OPPORTUNITY FOUND        Patients Insurance        Commercial Insurance: [employer]

## 2023-02-20 ENCOUNTER — APPOINTMENT (OUTPATIENT)
Dept: RADIOLOGY | Facility: CLINIC | Age: 53
End: 2023-02-20

## 2023-02-20 ENCOUNTER — OFFICE VISIT (OUTPATIENT)
Dept: FAMILY MEDICINE CLINIC | Facility: CLINIC | Age: 53
End: 2023-02-20

## 2023-02-20 VITALS
OXYGEN SATURATION: 97 % | HEIGHT: 73 IN | BODY MASS INDEX: 37.12 KG/M2 | TEMPERATURE: 98 F | WEIGHT: 280.13 LBS | SYSTOLIC BLOOD PRESSURE: 130 MMHG | HEART RATE: 75 BPM | DIASTOLIC BLOOD PRESSURE: 80 MMHG | RESPIRATION RATE: 18 BRPM

## 2023-02-20 DIAGNOSIS — R07.9 CHEST PAIN, UNSPECIFIED TYPE: Primary | ICD-10-CM

## 2023-02-20 DIAGNOSIS — R07.9 CHEST PAIN, UNSPECIFIED TYPE: ICD-10-CM

## 2023-02-20 DIAGNOSIS — I10 ESSENTIAL HYPERTENSION: ICD-10-CM

## 2023-02-20 RX ORDER — VALSARTAN AND HYDROCHLOROTHIAZIDE 80; 12.5 MG/1; MG/1
1 TABLET, FILM COATED ORAL DAILY
Qty: 90 TABLET | Refills: 3 | Status: SHIPPED | OUTPATIENT
Start: 2023-02-20

## 2023-02-20 NOTE — ASSESSMENT & PLAN NOTE
Chest pain  Etiology not established at this time  Patient will obtain chest x-ray for further evaluation    We will make further recommendations pending results of test

## 2023-02-20 NOTE — PROGRESS NOTES
FAMILY PRACTICE OFFICE VISIT       NAME: Bob Martínez  AGE: 45 y o  SEX: male       : 1970        MRN: [id number]    DATE: 2023  TIME: 8:13 AM    Assessment and Plan     Problem List Items Addressed This Visit        Cardiovascular and Mediastinum    Essential hypertension     Hypertension  Patient blood pressure is stable at this time and he will continue current regimen of medications  He will continue with his stationary bicycle use and attempts to lose weight once again         Relevant Medications    valsartan-hydrochlorothiazide (DIOVAN-HCT) 80-12 5 MG per tablet       Other    Chest pain - Primary     Chest pain  Etiology not established at this time  Patient will obtain chest x-ray for further evaluation  We will make further recommendations pending results of test          Relevant Orders    XR chest pa & lateral           Chief Complaint     Chief Complaint   Patient presents with   • Follow-up     6 month        History of Present Illness     Patient in the office to review chronic medical condition  He denies any recent illness but did contract COVID-19 infection in 2022  He states symptoms are mild and only lasted a few days  Patient still sees orthopedist for therapeutic injections of left knee  He has gained 20+ pounds due to inactivity however he recently started riding his stationary bicycle once again  Patient also reports 6-month history of chronic left upper quadrant abdominal/rib discomfort  He states symptoms are there most of the time however vary in intensity  He has not associated any triggers to exacerbate symptoms  He denies any increase with activities such as riding a bicycle  At times he states it is difficult to turn in bed when he is sleeping due to discomfort waking him up  He denies any changes in bowel movements, constipation or nausea  Review of Systems   Review of Systems   Constitutional: Negative  HENT: Negative      Eyes: Negative  Respiratory: Negative  Cardiovascular: Positive for chest pain  Negative for palpitations and leg swelling  Gastrointestinal: Negative  Endocrine: Negative  Genitourinary: Negative  Musculoskeletal: Positive for arthralgias and gait problem  Skin: Negative  Allergic/Immunologic: Negative  Hematological: Negative  Psychiatric/Behavioral: Negative  Active Problem List     Patient Active Problem List   Diagnosis   • Hypertension   • Esophageal reflux   • Onychomycosis   • Bursitis of heel, right   • Lesion of mouth   • Rupture of right quadriceps tendon   • Obesity (BMI 30-39  9)   • Chronic pain of both knees   • Hematochezia   • Chest pain   • Essential hypertension       Past Medical History:  Past Medical History:   Diagnosis Date   • Acute meniscal tear of left knee    • Arthritis    • Carpal tunnel syndrome, unspecified upper limb    • Hypercholesterolemia    • Hypertension    • Morbid obesity ([facility] [address] )        Past Surgical History:  Past Surgical History:   Procedure Laterality Date   • ARTHROSCOPY KNEE     • QUADRICEPS REPAIR         Family History:  Family History   Problem Relation Age of Onset   • Atrial fibrillation Mother         CARDIAC PACEMAKER   • Parkinsonism Mother    • Bleeding Disorder Father         ACTIVE INTERNAL BLEEDING   • Liver disease Father         LIVER DAMAGE       Social History:  Social History     Socioeconomic History   • Marital status: Single     Spouse name: Not on file   • Number of children: Not on file   • Years of education: Not on file   • Highest education level: Not on file   Occupational History   • Not on file   Tobacco Use   • Smoking status: Never   • Smokeless tobacco: Never   Vaping Use   • Vaping Use: Never used   Substance and Sexual Activity   • Alcohol use: No   • Drug use: No   • Sexual activity: Yes   Other Topics Concern   • Not on file   Social History Narrative   • Not on file     Social Determinants of Health Financial Resource Strain: Not on file   Food Insecurity: Not on file   Transportation Needs: Not on file   Physical Activity: Not on file   Stress: Not on file   Social Connections: Not on file   Intimate Partner Violence: Not on file   Housing Stability: Not on file       Objective     Vitals:    02/20/23 0732   BP: 130/80   Pulse: 75   Resp: 18   Temp: 98 °F (36 7 °C)   SpO2: 97%     Wt Readings from Last 3 Encounters:   02/20/23 127 kg (280 lb 2 oz)   08/18/22 119 kg (261 lb 6 oz)   06/03/22 119 kg (263 lb)       Physical Exam  Constitutional:       General: He is not in acute distress  Appearance: Normal appearance  He is not ill-appearing  HENT:      Head: Normocephalic and atraumatic  Eyes:      General:         Right eye: No discharge  Left eye: No discharge  Conjunctiva/sclera: Conjunctivae normal       Pupils: Pupils are equal, round, and reactive to light  Neck:      Vascular: No carotid bruit  Cardiovascular:      Rate and Rhythm: Normal rate and regular rhythm  Heart sounds: Normal heart sounds  No murmur heard  Pulmonary:      Effort: Pulmonary effort is normal       Breath sounds: Normal breath sounds  No wheezing, rhonchi or rales  Abdominal:      General: Abdomen is flat  Bowel sounds are normal  There is no distension  Palpations: Abdomen is soft  Tenderness: There is no abdominal tenderness  There is no guarding or rebound  Musculoskeletal:      Right lower leg: No edema  Left lower leg: No edema  Comments: Negative chest wall tenderness to palpation  Lymphadenopathy:      Cervical: No cervical adenopathy  Skin:     Findings: No rash  Neurological:      General: No focal deficit present  Mental Status: He is alert and oriented to person, place, and time  Cranial Nerves: No cranial nerve deficit  Psychiatric:         Mood and Affect: Mood normal          Behavior: Behavior normal          Thought Content:  Thought content normal          Judgment: Judgment normal          Pertinent Laboratory/Diagnostic Studies:  Lab Results   Component Value Date    GLUCOSE 84 10/17/2013    BUN 12 08/06/2022    CREATININE 1 05 08/06/2022    CALCIUM 9 5 08/06/2022     02/18/2017    K 4 7 08/06/2022    CO2 30 08/06/2022     08/06/2022     Lab Results   Component Value Date    ALT 16 08/06/2022    AST 19 08/06/2022    ALKPHOS 62 08/06/2022    BILITOT 0 6 02/18/2017       Lab Results   Component Value Date    WBC 5 0 08/06/2022    HGB 14 9 08/06/2022    HCT 46 4 08/06/2022    MCV 91 5 08/06/2022     08/06/2022       Lab Results   Component Value Date    TSH 2 29 08/06/2022       Lab Results   Component Value Date    CHOL 175 10/17/2013     Lab Results   Component Value Date    TRIG 90 08/06/2022     Lab Results   Component Value Date    HDL 46 08/06/2022     Lab Results   Component Value Date    LDLCALC 109 (H) 08/06/2022     No results found for: HGBA1C    Results for orders placed or performed in visit on 12/06/22   Cov/Flu-Collected at   [facility]    Specimen: Nose; Nares   Result Value Ref Range    SARS-CoV-2 Positive (A) Negative    INFLUENZA A PCR Negative Negative    INFLUENZA B PCR Negative Negative       Orders Placed This Encounter   Procedures   • XR chest pa & lateral       ALLERGIES:  No Known Allergies    Current Medications     Current Outpatient Medications   Medication Sig Dispense Refill   • valsartan-hydrochlorothiazide (DIOVAN-HCT) 80-12 5 MG per tablet Take 1 tablet by mouth daily 90 tablet 3     No current facility-administered medications for this visit           Health Maintenance     Health Maintenance   Topic Date Due   • Hepatitis C Screening  Never done   • HIV Screening  Never done   • DTaP,Tdap,and Td Vaccines (1 - Tdap) Never done   • BMI: Followup Plan  03/08/2022   • COVID-19 Vaccine (5 - Booster for Moderna series) 01/09/2023   • Influenza Vaccine (1) 06/30/2023 (Originally 9/1/2022)   • Annual Physical  08/18/2023   • Depression Screening  12/06/2023   • BMI: Adult  02/20/2024   • Colorectal Cancer Screening  11/04/2032   • Pneumococcal Vaccine: Pediatrics (0 to 5 Years) and At-Risk Patients (6 to 59 Years)  Aged Out   • HIB Vaccine  Aged Out   • IPV Vaccine  Aged Out   • Hepatitis A Vaccine  Aged Out   • Meningococcal ACWY Vaccine  Aged Out   • HPV Vaccine  Aged [employer] History   Administered Date(s) Administered   • COVID-19 MODERNA VACC 0 5 ML IM 01/21/2021, 02/19/2021, 11/11/2021, 05/27/2022   • COVID-19 Moderna Vac BIVALENT 12 Yr+ IM (BOOSTER ONLY) 0 5 ML 11/14/2022   • INFLUENZA 10/23/2013, 10/01/2018   • Influenza, injectable, quadrivalent, preservative free 0 5 mL 09/19/2020   • Influenza, recombinant, quadrivalent,injectable, preservative free 11/05/2019, 10/09/2021   • Influenza, seasonal, injectable 10/04/2016   • Influenza, seasonal, injectable, preservative free 10/01/2018   • Zoster Vaccine Recombinant 04/26/2022, 04/73/0450       Marthe Krabbe, MD

## 2023-02-20 NOTE — ASSESSMENT & PLAN NOTE
Hypertension  Patient blood pressure is stable at this time and he will continue current regimen of medications    He will continue with his stationary bicycle use and attempts to lose weight once again

## 2023-02-23 ENCOUNTER — TELEPHONE (OUTPATIENT)
Dept: FAMILY MEDICINE CLINIC | Facility: CLINIC | Age: 53
End: 2023-02-23

## 2023-02-23 NOTE — TELEPHONE ENCOUNTER
----- Message from Yvette Cole MD sent at 6/80/2301  6:38 AM EST -----  Please speak to patient directly  Chest x-ray was read as normal however if symptoms persist over the next 2 weeks without improvement I would consider obtaining an abdominal ultrasound for further evaluation    If patient would like to proceed he may call the office and we will place order in [facility] and he may call for appointment

## 2023-04-07 ENCOUNTER — TELEPHONE (OUTPATIENT)
Dept: FAMILY MEDICINE CLINIC | Facility: CLINIC | Age: 53
End: 2023-04-07

## 2023-04-07 NOTE — TELEPHONE ENCOUNTER
Good afternoon Magdalena Goldberg is getting knee surgery on June 1 and he needs a ekg can we do in the office ?  Thank Gildardo Lara

## 2023-05-11 ENCOUNTER — OFFICE VISIT (OUTPATIENT)
Dept: FAMILY MEDICINE CLINIC | Facility: CLINIC | Age: 53
End: 2023-05-11

## 2023-05-11 VITALS
SYSTOLIC BLOOD PRESSURE: 121 MMHG | RESPIRATION RATE: 18 BRPM | BODY MASS INDEX: 31.3 KG/M2 | TEMPERATURE: 98 F | DIASTOLIC BLOOD PRESSURE: 70 MMHG | HEART RATE: 55 BPM | WEIGHT: 270.5 LBS | OXYGEN SATURATION: 98 % | HEIGHT: 78 IN

## 2023-05-11 DIAGNOSIS — Z01.818 PREOPERATIVE EVALUATION OF A MEDICAL CONDITION TO RULE OUT SURGICAL CONTRAINDICATIONS (TAR REQUIRED): Primary | ICD-10-CM

## 2023-05-11 DIAGNOSIS — I10 ESSENTIAL HYPERTENSION: ICD-10-CM

## 2023-05-11 NOTE — ASSESSMENT & PLAN NOTE
Preoperative consultation  Overall the patient appears to be in stable health and is medically cleared to have upcoming left total knee replacement surgery as described

## 2023-05-11 NOTE — PROGRESS NOTES
FAMILY PRACTICE OFFICE VISIT       NAME: Bob Martínez  AGE: 45 y o  SEX: male       : 1970        MRN: [id number]    DATE: 2023  TIME: 11:16 AM    Assessment and Plan     Problem List Items Addressed This Visit        Cardiovascular and Mediastinum    Essential hypertension - Primary     Hypertension  The patient's blood pressure is stable at this time and he will continue current regimen of medications            Other    Preoperative evaluation of a medical condition to rule out surgical contraindications (TAR required)     Preoperative consultation  Overall the patient appears to be in stable health and is medically cleared to have upcoming left total knee replacement surgery as described  Chief Complaint     Chief Complaint   Patient presents with   • Pre-op Exam     Surgery on  2023 Adductor canal black         History of Present Illness     Patient in the office for preoperative consultation  He is scheduled to have left total knee replacement surgery on 2023  Procedure is scheduled to be performed by [facility] orthopedist Dr Ely  Patient denies any recent illness  I reviewed the patient's preadmission blood work which appears stable  Patient has been able to ride an exercise stationary bicycle for 1 hour/day without any chest pain or shortness of breath  Review of Systems   Review of Systems   Constitutional: Negative  HENT: Negative  Eyes: Negative  Respiratory: Negative  Cardiovascular: Negative  Gastrointestinal: Negative  Endocrine: Negative  Genitourinary: Negative  Musculoskeletal: Positive for arthralgias and gait problem  Allergic/Immunologic: Negative  Hematological: Negative  Psychiatric/Behavioral: Negative          Active Problem List     Patient Active Problem List   Diagnosis   • Hypertension   • Esophageal reflux   • Onychomycosis   • Bursitis of heel, right   • Lesion of mouth   • Rupture of right quadriceps tendon   • Obesity (BMI 30-39  9)   • Chronic pain of both knees   • Hematochezia   • Chest pain   • Essential hypertension   • Preoperative evaluation of a medical condition to rule out surgical contraindications (TAR required)       Past Medical History:  Past Medical History:   Diagnosis Date   • Acute meniscal tear of left knee    • Arthritis    • Carpal tunnel syndrome, unspecified upper limb    • Hypercholesterolemia    • Hypertension    • Morbid obesity ([address] )        Past Surgical History:  Past Surgical History:   Procedure Laterality Date   • ARTHROSCOPY KNEE     • QUADRICEPS REPAIR         Family History:  Family History   Problem Relation Age of Onset   • Atrial fibrillation Mother         CARDIAC PACEMAKER   • Parkinsonism Mother    • Bleeding Disorder Father         ACTIVE INTERNAL BLEEDING   • Liver disease Father         LIVER DAMAGE       Social History:  Social History     Socioeconomic History   • Marital status: Single     Spouse name: Not on file   • Number of children: Not on file   • Years of education: Not on file   • Highest education level: Not on file   Occupational History   • Not on file   Tobacco Use   • Smoking status: Never   • Smokeless tobacco: Never   Vaping Use   • Vaping Use: Never used   Substance and Sexual Activity   • Alcohol use: No   • Drug use: No   • Sexual activity: Yes   Other Topics Concern   • Not on file   Social History Narrative   • Not on file     Social Determinants of Health     Financial Resource Strain: Not on file   Food Insecurity: Not on file   Transportation Needs: Not on file   Physical Activity: Not on file   Stress: Not on file   Social Connections: Not on file   Intimate Partner Violence: Not on file   Housing Stability: Not on file       Objective     Vitals:    05/11/23 1043   BP: 121/70   Pulse: 55   Resp: 18   Temp: 98 °F (36 7 °C)   SpO2: 98%     Wt Readings from Last 3 Encounters:   05/11/23 123 kg (270 lb 8 oz) 02/20/23 127 kg (280 lb 2 oz)   08/18/22 119 kg (261 lb 6 oz)       Physical Exam  Constitutional:       General: He is not in acute distress  Appearance: Normal appearance  He is not ill-appearing  HENT:      Head: Normocephalic and atraumatic  Right Ear: Tympanic membrane, ear canal and external ear normal  There is no impacted cerumen  Left Ear: Tympanic membrane, ear canal and external ear normal  There is no impacted cerumen  Eyes:      General:         Right eye: No discharge  Left eye: No discharge  Extraocular Movements: Extraocular movements intact  Conjunctiva/sclera: Conjunctivae normal       Pupils: Pupils are equal, round, and reactive to light  Neck:      Vascular: No carotid bruit  Cardiovascular:      Rate and Rhythm: Normal rate and regular rhythm  Heart sounds: Normal heart sounds  No murmur heard  Pulmonary:      Effort: Pulmonary effort is normal       Breath sounds: Normal breath sounds  No wheezing, rhonchi or rales  Abdominal:      General: Abdomen is flat  Bowel sounds are normal  There is no distension  Palpations: Abdomen is soft  Tenderness: There is no abdominal tenderness  There is no guarding or rebound  Musculoskeletal:      Right lower leg: No edema  Left lower leg: No edema  Lymphadenopathy:      Cervical: No cervical adenopathy  Skin:     Findings: No rash  Neurological:      General: No focal deficit present  Mental Status: He is alert and oriented to person, place, and time  Cranial Nerves: No cranial nerve deficit  Psychiatric:         Mood and Affect: Mood normal          Behavior: Behavior normal          Thought Content: Thought content normal          Judgment: Judgment normal      EKG showed normal sinus rhythm at 61 bpm, horizontal axis, negative acute ischemic changes  Questionable left ventricular hypertrophy      Pertinent Laboratory/Diagnostic Studies:  Lab Results   Component Value Date    GLUCOSE 84 10/17/2013    BUN 12 08/06/2022    CREATININE 1 05 08/06/2022    CALCIUM 9 5 08/06/2022     02/18/2017    K 4 7 08/06/2022    CO2 30 08/06/2022     08/06/2022     Lab Results   Component Value Date    ALT 16 08/06/2022    AST 19 08/06/2022    ALKPHOS 62 08/06/2022    BILITOT 0 6 02/18/2017       Lab Results   Component Value Date    WBC 5 0 08/06/2022    HGB 14 9 08/06/2022    HCT 46 4 08/06/2022    MCV 91 5 08/06/2022     08/06/2022       Lab Results   Component Value Date    TSH 2 29 08/06/2022       Lab Results   Component Value Date    CHOL 175 10/17/2013     Lab Results   Component Value Date    TRIG 90 08/06/2022     Lab Results   Component Value Date    HDL 46 08/06/2022     Lab Results   Component Value Date    LDLCALC 109 (H) 08/06/2022     No results found for: HGBA1C    Results for orders placed or performed in visit on 12/06/22   Cov/Flu-Collected at   [facility]    Specimen: Nose; Nares   Result Value Ref Range    SARS-CoV-2 Positive (A) Negative    INFLUENZA A PCR Negative Negative    INFLUENZA B PCR Negative Negative       No orders of the defined types were placed in this encounter  ALLERGIES:  No Known Allergies    Current Medications     Current Outpatient Medications   Medication Sig Dispense Refill   • valsartan-hydrochlorothiazide (DIOVAN-HCT) 80-12 5 MG per tablet Take 1 tablet by mouth daily 90 tablet 3     No current facility-administered medications for this visit           Health Maintenance     Health Maintenance   Topic Date Due   • Hepatitis C Screening  Never done   • HIV Screening  Never done   • DTaP,Tdap,and Td Vaccines (1 - Tdap) Never done   • BMI: Followup Plan  03/08/2022   • Annual Physical  08/18/2023   • Influenza Vaccine (Season Ended) 09/01/2023   • Depression Screening  12/06/2023   • BMI: Adult  05/11/2024   • Colorectal Cancer Screening  11/04/2032   • COVID-19 Vaccine  Completed   • Pneumococcal Vaccine: Pediatrics (0 to 5 Years) and At-Risk Patients (6 to 59 Years)  Aged Out   • HIB Vaccine  Aged Out   • IPV Vaccine  Aged Out   • Hepatitis A Vaccine  Aged Out   • Meningococcal ACWY Vaccine  Aged Out   • HPV Vaccine  Aged [employer] History   Administered Date(s) Administered   • COVID-19 MODERNA VACC 0 5 ML IM 01/21/2021, 02/19/2021, 11/11/2021, 05/27/2022   • COVID-19 Moderna Vac BIVALENT 12 Yr+ IM (BOOSTER ONLY) 0 5 ML 11/14/2022   • INFLUENZA 10/23/2013, 10/01/2018   • Influenza, injectable, quadrivalent, preservative free 0 5 mL 09/19/2020   • Influenza, recombinant, quadrivalent,injectable, preservative free 11/05/2019, 10/09/2021   • Influenza, seasonal, injectable 10/04/2016   • Influenza, seasonal, injectable, preservative free 10/01/2018   • Zoster Vaccine Recombinant 04/26/2022, 58/53/7514       Alaina Mcguire MD

## 2023-05-25 ENCOUNTER — EVALUATION (OUTPATIENT)
Dept: PHYSICAL THERAPY | Facility: CLINIC | Age: 53
End: 2023-05-25

## 2023-05-25 DIAGNOSIS — M25.562 ACUTE PAIN OF LEFT KNEE: Primary | ICD-10-CM

## 2023-05-25 NOTE — PROGRESS NOTES
PT Evaluation     Today's date: 2023  Patient name: Abena Ospina  : 1970  MRN: [id number]  Referring provider: Ousmane Ramos MD  Dx:   Encounter Diagnosis     ICD-10-CM    1  Acute pain of left knee  M25 [id number]                      Assessment  Assessment details: Patient is a 45 y o  male who presents to outpatient PT pre-op TKA with pain, decreased rom, decreased strength and decreased functional mobility  He will benefit from skilled PT to address these deficits in order to achieve his goals and maximize his functional mobility  He has been provided with an initial hep and is scheduled for his first post-op f/u on 23                Goals  Short Term Goals: Independent performance of initial hep  Decrease pain 2 points on VAS      Long Term Goals: Independent performance of comprehensive hep  Work performance is returned to max level of function  Performance of IADL's is returned to max level of function  Performance in recreational activities is improved to max level of function  Able to walk community distances with no AD  Able to climb stairs with reciprocal gait pattern and single rail  Return to bowling    Plan  Planned therapy interventions: manual therapy, IADL retraining, strengthening, stretching, therapeutic activities, therapeutic exercise, therapeutic training, transfer training, home exercise program and gait training  Frequency: 2x week  Duration in weeks: 8        Subjective Evaluation    History of Present Illness  Mechanism of injury: Pt present pre-op L TKA  Reports that he has been having pain for years but that it has been increasing  Reports that he has pain with stair climbing, walking community distance and is unable to squats to the floor  Reports regular disturbances at night from pain  Reports that he works from home and plans to return ASAP  Reports that he would like to return to bowling     Pain  Current pain ratin  At worst pain ratin    Patient Goals  Patient goals for therapy: decreased edema, increased strength, independence with ADLs/IADLs, return to sport/leisure activities, decreased pain and increased motion          Objective  Tender to Palpation:     ROM   Flexion:   Extension:    Strength:   Flexion:  /5   Extension:  /5    Ambulates with min hip/knee flexion during swing  Unable to achieve TKE during stance  Mod edema noted             Precautions: hx of R quad tendon repair, L TKA      Manuals             prom                                                    Neuro Re-Ed                                                                                                        Ther Ex             Heel slides             Quad set             slr             Prone TKE             laq             Hs curls             Cone taps             Hr/tr             gastroc stretch             Step ups                                                                                           Ther Activity             bike                          Gait Training             prn                          Modalities             Cp with extension hang

## 2023-05-25 NOTE — LETTER
May 25, 2023    Adiel Chavarria MD  [address]  [address]  [address] [address]    Patient: Esequiel Cabrera   YOB: 1970   Date of Visit: 2023     Encounter Diagnosis     ICD-10-CM    1  Acute pain of left knee  M25 562           Dear Dr López Ricardo: Thank you for your recent referral of Esequiel Cabrera  Please review the attached evaluation summary from Bob's recent visit  Please verify that you agree with the plan of care by signing the attached order  If you have any questions or concerns, please do not hesitate to call  I sincerely appreciate the opportunity to share in the care of one of your patients and hope to have another opportunity to work with you in the near future  Sincerely,    Sarai Vivas, PT      Referring Provider:      I certify that I have read the below Plan of Care and certify the need for these services furnished under this plan of treatment while under my care  Adiel Chavarria MD  [facility] [address] [address]  Via Fax: [phone]          PT Evaluation     Today's date: 2023  Patient name: Esequiel Cabrera  : 1970  MRN: [id number]  Referring provider: Rob Molina MD  Dx:   Encounter Diagnosis     ICD-10-CM    1  Acute pain of left knee  M25 562                      Assessment  Assessment details: Patient is a 45 y o  male who presents to outpatient PT pre-op TKA with pain, decreased rom, decreased strength and decreased functional mobility  He will benefit from skilled PT to address these deficits in order to achieve his goals and maximize his functional mobility  He has been provided with an initial hep and is scheduled for his first post-op f/u on 23                Goals  Short Term Goals: Independent performance of initial hep  Decrease pain 2 points on VAS      Long Term Goals:   Independent performance of comprehensive hep  Work performance is returned to max level of function  Performance of IADL's is returned to max level of function  Performance in recreational activities is improved to max level of function  Able to walk community distances with no AD  Able to climb stairs with reciprocal gait pattern and single rail  Return to bowling    Plan  Planned therapy interventions: manual therapy, IADL retraining, strengthening, stretching, therapeutic activities, therapeutic exercise, therapeutic training, transfer training, home exercise program and gait training  Frequency: 2x week  Duration in weeks: 8        Subjective Evaluation    History of Present Illness  Mechanism of injury: Pt present pre-op L TKA  Reports that he has been having pain for years but that it has been increasing  Reports that he has pain with stair climbing, walking community distance and is unable to squats to the floor  Reports regular disturbances at night from pain  Reports that he works from home and plans to return ASAP  Reports that he would like to return to bowling     Pain  Current pain ratin  At worst pain ratin    Patient Goals  Patient goals for therapy: decreased edema, increased strength, independence with ADLs/IADLs, return to sport/leisure activities, decreased pain and increased motion          Objective  Tender to Palpation:     ROM   Flexion:   Extension:    Strength:   Flexion:  /5   Extension:  /5    Ambulates with min hip/knee flexion during swing  Unable to achieve TKE during stance  Mod edema noted            Precautions: hx of R quad tendon repair, L TKA      Manuals             prom                                                    Neuro Re-Ed                                                                                                        Ther Ex             Heel slides             Quad set             slr             Prone TKE             laq             Hs curls             Cone taps             Hr/tr             gastroc stretch             Step ups                                                                                           Ther Activity             bike                          Gait Training             prn                          Modalities             Cp with extension hang

## 2023-06-05 ENCOUNTER — OFFICE VISIT (OUTPATIENT)
Dept: PHYSICAL THERAPY | Facility: CLINIC | Age: 53
End: 2023-06-05
Payer: COMMERCIAL

## 2023-06-05 DIAGNOSIS — M25.562 ACUTE PAIN OF LEFT KNEE: Primary | ICD-10-CM

## 2023-06-05 PROCEDURE — 97140 MANUAL THERAPY 1/> REGIONS: CPT | Performed by: PHYSICAL THERAPIST

## 2023-06-05 PROCEDURE — 97164 PT RE-EVAL EST PLAN CARE: CPT | Performed by: PHYSICAL THERAPIST

## 2023-06-05 NOTE — LETTER
2023    Liana Vega MD  [address]  [address]  [place] [address]    Patient: Jenny Yao   YOB: 1970   Date of Visit: 2023     Encounter Diagnosis     ICD-10-CM    1  Acute pain of left knee  M25 562           Dear Dr Mat Khan: Thank you for your recent referral of Jenny Yao  Please review the attached evaluation summary from Bob's recent visit  Please verify that you agree with the plan of care by signing the attached order  If you have any questions or concerns, please do not hesitate to call  I sincerely appreciate the opportunity to share in the care of one of your patients and hope to have another opportunity to work with you in the near future  Sincerely,    Magdi Figueroa, PT      Referring Provider:      I certify that I have read the below Plan of Care and certify the need for these services furnished under this plan of treatment while under my care  Liana Vega MD  [facility] [address] [address]  Via Fax: [phone]          PT Evaluation     Today's date: 2023  Patient name: Jenny Yao  : 1970  MRN: [id number]  Referring provider: Sandra Denson MD  Dx:   Encounter Diagnosis     ICD-10-CM    1  Acute pain of left knee  M25 562                      Assessment  Assessment details: Patient is a 45 y o  male who presents to outpatient PT post-op TKA with pain, decreased rom, decreased strength and decreased functional mobility  He will benefit from skilled PT to address these deficits in order to achieve his goals and maximize his functional mobility  Goals  Short Term Goals: Independent performance of initial hep  Decrease pain 2 points on VAS      Long Term Goals:   Independent performance of comprehensive hep  Work performance is returned to max level of function  Performance of IADL's is returned to max level of function  Performance in recreational activities is improved to max level of function  Able to walk community distances with no AD  Able to climb stairs with reciprocal gait pattern and single rail  Return to [facility]    Plan  Planned therapy interventions: manual therapy, IADL retraining, strengthening, stretching, therapeutic activities, therapeutic exercise, therapeutic training, transfer training, home exercise program and gait training  Frequency: 2x week  Duration in weeks: 8        Subjective Evaluation    History of Present Illness  Mechanism of injury: Pt present post-op L TKA 23  Reports that he is comfortable ambulating with RW and has been regularly with pain medication  Is limited in all IADL's currently  Report compliance with [employer] bike  Reports that he works from home and plans to return ASAP  Reports that he would like to return to [facility]  Pain  Current pain ratin  At worst pain ratin    Patient Goals  Patient goals for therapy: decreased edema, increased strength, independence with ADLs/IADLs, return to sport/leisure activities, decreased pain and increased motion          Objective      ROM   Flexion: 47   Extension: -8    Strength:   Flexion:  3-/5   Extension: 3- /5    Poor quad set, unable to initiate SLR  Ambulates with min hip/knee flexion during swing  Mod edema noted  Neg howman's   Incision site is clean and closed with post-op bandage, pt was instructed to remove this in 5 days  No obvious signs of infection              Precautions: hx of R quad tendon repair, L TKA      Manuals             prom kl                                                   Neuro Re-Ed                                                                                                        Ther Ex             Heel slides             Quad set             slr             Prone TKE             laq             Hs curls             Cone taps             Hr/tr             gastroc stretch Step ups                                                                                           Ther Activity             bike                          Gait Training             prn With ext hang x10'                         Modalities             Cp with extension hang

## 2023-06-05 NOTE — PROGRESS NOTES
PT Evaluation     Today's date: 2023  Patient name: Kelvin Anaya  : 1970  MRN: [id number]  Referring provider: Jonel West MD  Dx:   Encounter Diagnosis     ICD-10-CM    1  Acute pain of left knee  M25 562                      Assessment  Assessment details: Patient is a 45 y o  male who presents to outpatient PT post-op TKA with pain, decreased rom, decreased strength and decreased functional mobility  He will benefit from skilled PT to address these deficits in order to achieve his goals and maximize his functional mobility  Goals  Short Term Goals: Independent performance of initial hep  Decrease pain 2 points on VAS      Long Term Goals: Independent performance of comprehensive hep  Work performance is returned to max level of function  Performance of IADL's is returned to max level of function  Performance in recreational activities is improved to max level of function  Able to walk community distances with no AD  Able to climb stairs with reciprocal gait pattern and single rail  Return to bowling    Plan  Planned therapy interventions: manual therapy, IADL retraining, strengthening, stretching, therapeutic activities, therapeutic exercise, therapeutic training, transfer training, home exercise program and gait training  Frequency: 2x week  Duration in weeks: 8        Subjective Evaluation    History of Present Illness  Mechanism of injury: Pt present post-op L TKA 23  Reports that he is comfortable ambulating with RW and has been regularly with pain medication  Is limited in all IADL's currently  Report compliance with [employer] bike  Reports that he works from home and plans to return ASAP  Reports that he would like to return to bowling     Pain  Current pain ratin  At worst pain ratin    Patient Goals  Patient goals for therapy: decreased edema, increased strength, independence with ADLs/IADLs, return to sport/leisure activities, decreased pain and increased motion          Objective      ROM   Flexion: 47   Extension: -8    Strength:   Flexion:  3-/5   Extension: 3- /5    Poor quad set, unable to initiate SLR  Ambulates with min hip/knee flexion during swing  Mod edema noted  Neg howman's   Incision site is clean and closed with post-op bandage, pt was instructed to remove this in 5 days  No obvious signs of infection               Precautions: hx of R quad tendon repair, L TKA      Manuals 6/5            prom kl                                                   Neuro Re-Ed                                                                                                        Ther Ex             Heel slides             Quad set             slr             Prone TKE             laq             Hs curls             Cone taps             Hr/tr             gastroc stretch             Step ups                                                                                           Ther Activity             bike                          Gait Training             prn With ext hang x10'                         Modalities             Cp with extension hang

## 2023-06-07 ENCOUNTER — OFFICE VISIT (OUTPATIENT)
Dept: PHYSICAL THERAPY | Facility: CLINIC | Age: 53
End: 2023-06-07
Payer: COMMERCIAL

## 2023-06-07 DIAGNOSIS — M25.562 ACUTE PAIN OF LEFT KNEE: Primary | ICD-10-CM

## 2023-06-07 PROCEDURE — 97110 THERAPEUTIC EXERCISES: CPT

## 2023-06-07 PROCEDURE — 97140 MANUAL THERAPY 1/> REGIONS: CPT

## 2023-06-07 NOTE — PROGRESS NOTES
"Daily Note     Today's date: 2023  Patient name: Garfield Navarro  : 1970  MRN: [id number]  Referring provider: Vista Osgood, MD  Dx:   Encounter Diagnosis     ICD-10-CM    1  Acute pain of left knee  M25 562           Start Time: 931  Stop Time:   Total time in clinic (min): 42 minutes      Subjective: Patient reports his left knee feels very swollen  Left knee pain rated 4/10  Objective: See treatment diary below  Assessment: Treatment is tolerated well  Patient would benefit from continued PT to restore left knee ROM and strength  Plan: Continue treatment as per PT plan of care         Precautions: hx of R quad tendon repair, L TKA      Manuals            prom kl JLW                                                  Neuro Re-Ed                                                                                                        Ther Ex             Heel slides  5\"x10           Quad set  5\"x10           slr  min assist  10           Prone TKE             laq  10           Hs curls  10           Cone taps  20 ea           Hr/tr  20 ea           gastroc stretch   30\"x3           Step ups                                                                                           Ther Activity             bike                          Gait Training             prn With ext hang x10'                         Modalities             CP with extension hang                               " Female

## 2023-06-12 ENCOUNTER — OFFICE VISIT (OUTPATIENT)
Dept: PHYSICAL THERAPY | Facility: CLINIC | Age: 53
End: 2023-06-12
Payer: COMMERCIAL

## 2023-06-12 DIAGNOSIS — M25.562 ACUTE PAIN OF LEFT KNEE: Primary | ICD-10-CM

## 2023-06-12 PROCEDURE — 97110 THERAPEUTIC EXERCISES: CPT

## 2023-06-12 PROCEDURE — 97140 MANUAL THERAPY 1/> REGIONS: CPT

## 2023-06-12 NOTE — PROGRESS NOTES
"Daily Note     Today's date: 2023  Patient name: Ian Westfall  : 1970  MRN: [id number]  Referring provider: Randi Watkins MD  Dx:   Encounter Diagnosis     ICD-10-CM    1  Acute pain of left knee  M25 562           Start Time: 930  Stop Time: 1010  Total time in clinic (min): 40 minutes      Subjective: Patient reports his left knee is feeling okay  Patient reports he continues to use ice often  Objective: See treatment diary below  Assessment: Progression of therapeutic exercise program is tolerated well  Patient's ability to perform supine slr flexion improved  Patient would benefit from continued PT to restore left knee ROM and strength  Plan: Continue treatment as per PT plan of care         Precautions: hx of R quad tendon repair, L TKA      Manuals           prom kl JLW JLW                                                 Neuro Re-Ed                                                                                                        Ther Ex             Heel slides  5\"x10 5\"x20          Quad set  5\"x10 5\"x10          slr  min assist  10 CG  10          Prone TKE             laq  10 30          Hs curls  10 20          Cone taps  20 ea 20 ea          Hr/tr  20 ea 20 ea          gastroc stretch   30\"x3  30\"x3          Step ups    4\"   2x10                                                                                        Ther Activity             bike                          Gait Training             prn With ext hang x10'                         Modalities             CP with extension hang                               "

## 2023-06-14 ENCOUNTER — OFFICE VISIT (OUTPATIENT)
Dept: PHYSICAL THERAPY | Facility: CLINIC | Age: 53
End: 2023-06-14
Payer: COMMERCIAL

## 2023-06-14 DIAGNOSIS — M25.562 ACUTE PAIN OF LEFT KNEE: Primary | ICD-10-CM

## 2023-06-14 PROCEDURE — 97140 MANUAL THERAPY 1/> REGIONS: CPT

## 2023-06-14 PROCEDURE — 97110 THERAPEUTIC EXERCISES: CPT

## 2023-06-14 NOTE — PROGRESS NOTES
"Daily Note     Today's date: 2023  Patient name: Naima Arriola  : 1970  MRN: [id number]  Referring provider: Panchito Jara MD  Dx:   Encounter Diagnosis     ICD-10-CM    1  Acute pain of left knee  M25 562           Start Time: 930  Stop Time: 55  Total time in clinic (min): 44 minutes      Subjective: Patient reports his left knee is feeling stiff  Objective: See treatment diary below  Assessment: Fatigue noted with progression of therapeutic exercise program   Left knee flexion ROM significantly limited therefore manual stretching should be performed both supine and seated  Patient would benefit from continued PT to restore left knee ROM and strength  Plan: Continue treatment as per PT plan of care         Precautions: hx of R quad tendon repair, L TKA      Manuals          prom kl JLW JLW JLW                                                Neuro Re-Ed                                                                                                        Ther Ex             Heel slides  5\"x10 5\"x20 5\"x20         Quad set  5\"x10 5\"x10 5\"x10         slr  min assist  10 CG  10 15         Prone TKE             laq  10 30 30         Hs curls  10 20 20         Cone taps  20 ea 20 ea          Hr/tr  20 ea 20 ea 30 ea         gastroc stretch   30\"x3  30\"x3  30\"x3         step ups    4\"   2x10  6\"   15         bike     ROM   5'         standing slr x3     20 ea                                                             Ther Activity             bike                          Gait Training             prn With ext hang x10'                         Modalities             CP with extension hang                               "

## 2023-06-19 ENCOUNTER — OFFICE VISIT (OUTPATIENT)
Dept: PHYSICAL THERAPY | Facility: CLINIC | Age: 53
End: 2023-06-19
Payer: COMMERCIAL

## 2023-06-19 DIAGNOSIS — M25.562 ACUTE PAIN OF LEFT KNEE: Primary | ICD-10-CM

## 2023-06-19 PROCEDURE — 97110 THERAPEUTIC EXERCISES: CPT

## 2023-06-19 PROCEDURE — 97140 MANUAL THERAPY 1/> REGIONS: CPT

## 2023-06-19 NOTE — PROGRESS NOTES
"Daily Note     Today's date: 2023  Patient name: Trini Ayala  : 1970  MRN: [id number]  Referring provider: Aidan Fernandez MD  Dx:   Encounter Diagnosis     ICD-10-CM    1  Acute pain of left knee  M25 562                      Subjective: \"Not too bad, stiff a little bit  \"    Objective: See treatment diary below    Assessment: Performed exercise program w/o difficulty or discomfort  Able to maicol PROM, flex measured 98* today  Discussed how to properly elevate his leg, and in self massage of quad and ITB  Will monitor  Plan: Cont per POC       Precautions: hx of R quad tendon repair, L TKA      Manuals         prom kl JLW JLW JLW MO                                               Neuro Re-Ed                                                                                                        Ther Ex             Heel slides  5\"x10 5\"x20 5\"x20 5\"x20        Quad set  5\"x10 5\"x10 5\"x10 5\"x10        slr  min assist  10 CG  10 15 20        Prone TKE             laq  10 30 30 30        Hs curls  10 20 20 20        Cone taps  20 ea 20 ea          Hr/tr  20 ea 20 ea 30 ea 30 ea        gastroc stretch   30\"x3  30\"x3  30\"x3 30\"x3        step ups    4\"   2x10  6\"   15 6\" 20        bike     ROM   5' ROM  5'        standing slr x3     20 ea 20 ea                                                            Ther Activity             bike                          Gait Training             prn With ext hang x10'                         Modalities             CP with extension hang                               "

## 2023-06-22 ENCOUNTER — OFFICE VISIT (OUTPATIENT)
Dept: PHYSICAL THERAPY | Facility: CLINIC | Age: 53
End: 2023-06-22
Payer: COMMERCIAL

## 2023-06-22 DIAGNOSIS — M25.562 ACUTE PAIN OF LEFT KNEE: Primary | ICD-10-CM

## 2023-06-22 PROCEDURE — 97112 NEUROMUSCULAR REEDUCATION: CPT

## 2023-06-22 PROCEDURE — 97110 THERAPEUTIC EXERCISES: CPT

## 2023-06-22 PROCEDURE — 97140 MANUAL THERAPY 1/> REGIONS: CPT

## 2023-06-22 NOTE — PROGRESS NOTES
"Daily Note     Today's date: 2023  Patient name: Zahra Weaver  : 1970  MRN: [id number]  Referring provider: Milagro Moss MD  Dx: No diagnosis found  Subjective: Pt notes feeling stiff most of the time, the follow up with surgeon yesterday with steri-strips removed and the surgeon notes it is progressing well but needs to work on straightening the leg  Objective: See treatment diary below      Assessment: Tolerated treatment well  Pt was able to achieve greater ROM after PROM but continues to lack full extension/flexion  Pt tolerated increased weight during LAQ, standing SLR, and Single leg heel raises  Patient would benefit from continued PT to address weakness and ROM deficits and continue progression of knee ROM  Plan: Progress treatment as tolerated         Precautions: hx of R quad tendon repair, L TKA      Manuals        prom kl JLW JLW JLW MO CB                                              Neuro Re-Ed                                                                                                        Ther Ex             Heel slides  5\"x10 5\"x20 5\"x20 5\"x20 5\"x20       Quad set  5\"x10 5\"x10 5\"x10 5\"x10 5\"x10       slr  min assist  10 CG  10 15 20 x20       Prone TKE      with towel underthigh, combined with HS curls x30       laq  10 30 30 30 x30 1#       Hs curls  10 20 20 20        Cone taps  20 ea 20 ea          Hr/tr  20 ea 20 ea 30 ea 30 ea LLE x30 HR       gastroc stretch   30\"x3  30\"x3  30\"x3 30\"x3 30\"x3       step ups    4\"   2x10  6\"   15 6\" 20 6\" x20 Fwd       bike     ROM   5' ROM  5' ROM 5'       standing slr x3     20 ea 20 ea x20 ea 1#                                                           Ther Activity             bike                          Gait Training             prn With ext hang x10'                         Modalities             CP with extension hang                               "

## 2023-06-26 ENCOUNTER — OFFICE VISIT (OUTPATIENT)
Dept: PHYSICAL THERAPY | Facility: CLINIC | Age: 53
End: 2023-06-26
Payer: COMMERCIAL

## 2023-06-26 DIAGNOSIS — M25.562 ACUTE PAIN OF LEFT KNEE: Primary | ICD-10-CM

## 2023-06-26 PROCEDURE — 97140 MANUAL THERAPY 1/> REGIONS: CPT

## 2023-06-26 PROCEDURE — 97530 THERAPEUTIC ACTIVITIES: CPT

## 2023-06-26 PROCEDURE — 97110 THERAPEUTIC EXERCISES: CPT

## 2023-06-26 NOTE — PROGRESS NOTES
"Daily Note     Today's date: 2023  Patient name: Emanuel Becerra  : 1970  MRN: [id number]  Referring provider: Bruce Lynn MD  Dx:   Encounter Diagnosis     ICD-10-CM    1  Acute pain of left knee  M25 562           Start Time: 1100  Stop Time: 1148  Total time in clinic (min): 48 minutes      Subjective: Patient reports his left knee is stiff and sometime aches  Patient states, \"I can drive now  \"      Objective: See treatment diary below  Assessment: Fatigue noted with progression of therapeutic exercise program   Patient able to pedal full revolutions on the recumbent bike  Patient has good tolerance to more aggressive manual stretching  Left knee ROM and strength improving  Plan: Continue treatment as per PT plan of care         Precautions: hx of R quad tendon repair, L TKA      Manuals       prom kl JLW JLW JLW MO CB JLW                                             Neuro Re-Ed                                                                                                        Ther Ex             Heel slides  5\"x10 5\"x20 5\"x20 5\"x20 5\"x20 5\"x20      Quad set  5\"x10 5\"x10 5\"x10 5\"x10 5\"x10       slr  min assist  10 CG  10 15 20 x20       Prone TKE      with towel under thigh, combined with HS curls x30 5\"x20      laq  10 30 30 30 x30 1# 2 5#  20      Hs curls  10 20 20 20  20      Cone taps  20 ea 20 ea          Hr/tr  20 ea 20 ea 30 ea 30 ea LLE x30 HR hr  30      gastroc stretch   30\"x3  30\"x3  30\"x3 30\"x3 30\"x3  30\"x3      step ups    4\"   2x10  6\"   15 6\" 20 6\" x20 Fwd  8\"   30      bike     ROM   5' ROM  5' ROM 5'        standing slr x3     20 ea 20 ea x20 ea 1#  2#   20 ea                                                          Ther Activity             bike       8'      Sit to stand       10                                Gait Training             prn With ext hang x10'                         Modalities             CP with extension " hang

## 2023-06-29 ENCOUNTER — OFFICE VISIT (OUTPATIENT)
Dept: PHYSICAL THERAPY | Facility: CLINIC | Age: 53
End: 2023-06-29
Payer: COMMERCIAL

## 2023-06-29 DIAGNOSIS — M25.562 ACUTE PAIN OF LEFT KNEE: Primary | ICD-10-CM

## 2023-06-29 PROCEDURE — 97110 THERAPEUTIC EXERCISES: CPT

## 2023-06-29 PROCEDURE — 97140 MANUAL THERAPY 1/> REGIONS: CPT

## 2023-06-29 PROCEDURE — 97530 THERAPEUTIC ACTIVITIES: CPT

## 2023-06-29 NOTE — PROGRESS NOTES
"Daily Note     Today's date: 2023  Patient name: Merry Matamoros  : 1970  MRN: [id number]  Referring provider: Vanessa Vitale MD  Dx:   Encounter Diagnosis     ICD-10-CM    1  Acute pain of left knee  M25 562                        Subjective: Patient reports he is pain free upon presentation but has some soreness in his L knee from last session  Objective: See treatment diary below  Assessment: Pt tolerated treatment well today with no increased symptoms  Knee ROM is improving with a tight end feel still present, progress made compared to last week  He was able to tolerate a good amount of over pressure with PROM  Progress towards his long term goals was made today  Plan: Continue treatment as per PT plan of care         Precautions: hx of R quad tendon repair, L TKA      Manuals      prom kl JLW JLW JLW MO CB JLW MM                                            Neuro Re-Ed                                                                                                        Ther Ex             Heel slides  5\"x10 5\"x20 5\"x20 5\"x20 5\"x20 5\"x20 5\"x20     Quad set  5\"x10 5\"x10 5\"x10 5\"x10 5\"x10       slr  min assist  10 CG  10 15 20 x20       Prone TKE      with towel under thigh, combined with HS curls x30 5\"x20 TKE prone 5\"x20     laq  10 30 30 30 x30 1# 2 5#  20 3# 20     Hs curls  10 20 20 20  20 2# 20     Cone taps  20 ea 20 ea          Hr/tr  20 ea 20 ea 30 ea 30 ea LLE x30 HR hr  30 Hr 30     gastroc stretch   30\"x3  30\"x3  30\"x3 30\"x3 30\"x3  30\"x3 30\"x3     step ups    4\"   2x10  6\"   15 6\" 20 6\" x20 Fwd  8\"   30 8\" 30     bike     ROM   5' ROM  5' ROM 5'        standing slr x3     20 ea 20 ea x20 ea 1#  2#   20 ea 2# 20 ea                                                         Ther Activity             bike       8' 8'     Sit to stand       10 10                               Gait Training             prn With ext hang x10'              " Modalities             CP with extension hang

## 2023-07-03 ENCOUNTER — OFFICE VISIT (OUTPATIENT)
Dept: PHYSICAL THERAPY | Facility: CLINIC | Age: 53
End: 2023-07-03
Payer: COMMERCIAL

## 2023-07-03 DIAGNOSIS — M25.562 ACUTE PAIN OF LEFT KNEE: Primary | ICD-10-CM

## 2023-07-03 PROCEDURE — 97112 NEUROMUSCULAR REEDUCATION: CPT

## 2023-07-03 PROCEDURE — 97140 MANUAL THERAPY 1/> REGIONS: CPT

## 2023-07-03 PROCEDURE — 97110 THERAPEUTIC EXERCISES: CPT

## 2023-07-03 PROCEDURE — 97530 THERAPEUTIC ACTIVITIES: CPT

## 2023-07-03 NOTE — PROGRESS NOTES
Daily Note     Today's date: 7/3/2023  Patient name: Shyla Trevizo  : 1970  MRN: [id number]  Referring provider: Josph Dakin, MD  Dx:   Encounter Diagnosis     ICD-10-CM    1. Acute pain of left knee  M25.562                        Subjective: Patient reports knee is feeling stiff but overall doing well. Objective: See treatment diary below. Assessment: Pt tolerated treatment well today with no increased symptoms. Good tolerance to additional strengthening activities today. He was able to tolerate a good amount of over pressure with PROM. Pain is well managed. Plan: Continue treatment as per PT plan of care.        Precautions: hx of R quad tendon repair, L TKA      Manuals 6/5 6/7 6/12 6/14 6/19 6/22 6/26 6/29 7/3    prom kl JLW JLW JLW MO CB JLW MM ksg                                           Neuro Re-Ed                                                                                                        Ther Ex             Heel slides  5"x10 5"x20 5"x20 5"x20 5"x20 5"x20 5"x20 5" x20    Quad set  5"x10 5"x10 5"x10 5"x10 5"x10       slr  min assist  10 CG  10 15 20 x20       Prone TKE      with towel under thigh, combined with HS curls x30 5"x20 TKE prone 5"x20 TKE prone 5"x20    laq  10 30 30 30 x30 1# 2.5#  20 3# 20 3# x20    Hs curls  10 20 20 20  20 2# 20 3# x20    Cone taps  20 ea 20 ea          Hr/tr  20 ea 20 ea 30 ea 30 ea LLE x30 HR hr  30 Hr 30 HR x30    gastroc stretch   30"x3  30"x3  30"x3 30"x3 30"x3  30"x3 30"x3 30" x3    step ups    4"   2x10  6"   15 6" 20 6" x20 Fwd  8"   30 8" 30 8" x30    bike     ROM   5' ROM  5' ROM 5'        standing slr x3     20 ea 20 ea x20 ea 1#  2#   20 ea 2# 20 ea 3# x20 ea    Leg Press         90# 3x10 B/L  40# 3x10 S/L                                           Ther Activity             bike       8' 8' 8'    Sit to stand       10 10 2x10                              Gait Training             prn With ext hang x10' Modalities             CP with extension hang

## 2023-07-06 ENCOUNTER — OFFICE VISIT (OUTPATIENT)
Dept: PHYSICAL THERAPY | Facility: CLINIC | Age: 53
End: 2023-07-06
Payer: COMMERCIAL

## 2023-07-06 DIAGNOSIS — M25.562 ACUTE PAIN OF LEFT KNEE: Primary | ICD-10-CM

## 2023-07-06 PROCEDURE — 97110 THERAPEUTIC EXERCISES: CPT | Performed by: PHYSICAL THERAPIST

## 2023-07-06 PROCEDURE — 97140 MANUAL THERAPY 1/> REGIONS: CPT | Performed by: PHYSICAL THERAPIST

## 2023-07-06 PROCEDURE — 97530 THERAPEUTIC ACTIVITIES: CPT | Performed by: PHYSICAL THERAPIST

## 2023-07-06 NOTE — PROGRESS NOTES
Daily Note     Today's date: 2023  Patient name: Kiesha Duarte  : 1970  MRN: [id number]  Referring provider: Alayna Rand MD  Dx:   Encounter Diagnosis     ICD-10-CM    1. Acute pain of left knee  M25.562                      Subjective: Pt reports his knee still feels stiff, when walking. Objective: See treatment diary below      Assessment: Pt still shows stiffness with walking initially at beginning of visit. Added gait with cones for visual cues to bend knee as well as verbal cues to decrease circumduction. Pt measuring at 110 degrees of passive flexion. Added quad stretch for tight band feeling, educated to add at home as well. Strength is progressing very well. Plan: Continue per plan of care.       Precautions: hx of R quad tendon repair, L TKA      Manuals 6/5 6/7 6/12 6/14 6/19 6/22 6/26 6/29 7/3 7/6   prom kl JLW JLW JLW MO CB JLW MM ksg                                           Neuro Re-Ed                                                                                                        Ther Ex             Heel slides  5"x10 5"x20 5"x20 5"x20 5"x20 5"x20 5"x20 5" x20    Quad set  5"x10 5"x10 5"x10 5"x10 5"x10       slr  min assist  10 CG  10 15 20 x20       Prone TKE      with towel under thigh, combined with HS curls x30 5"x20 TKE prone 5"x20 TKE prone 5"x20    laq  10 30 30 30 x30 1# 2.5#  20 3# 20 3# x20 30x4#   Hs curls  10 20 20 20  20 2# 20 3# x20 4#x20   Cone taps  20 ea 20 ea          Hr/tr  20 ea 20 ea 30 ea 30 ea LLE x30 HR hr  30 Hr 30 HR x30 HR 30x   gastroc stretch   30"x3  30"x3  30"x3 30"x3 30"x3  30"x3 30"x3 30" x3    step ups    4"   2x10  6"   15 6" 20 6" x20 Fwd  8"   30 8" 30 8" x30 30x 8'' step overs   bike     ROM   5' ROM  5' ROM 5'        standing slr x3     20 ea 20 ea x20 ea 1#  2#   20 ea 2# 20 ea 3# x20 ea    Leg Press         90# 3x10 B/L  40# 3x10 S/L 90# 3x10 B/L  40# 3x10 S/L   Lateral step downs          2x10 6''   Quad stretch 5x30''                Ther Activity             bike       8' 8' 8' 8'   Sit to stand       10 10 2x10 2x10 foam under Rt   Gait with cones          5 laps                Gait Training             prn With ext hang x10'                         Modalities             CP with extension hang

## 2023-07-10 ENCOUNTER — OFFICE VISIT (OUTPATIENT)
Dept: PHYSICAL THERAPY | Facility: CLINIC | Age: 53
End: 2023-07-10
Payer: COMMERCIAL

## 2023-07-10 DIAGNOSIS — M25.562 ACUTE PAIN OF LEFT KNEE: Primary | ICD-10-CM

## 2023-07-10 PROCEDURE — 97110 THERAPEUTIC EXERCISES: CPT | Performed by: PHYSICAL THERAPIST

## 2023-07-10 PROCEDURE — 97140 MANUAL THERAPY 1/> REGIONS: CPT | Performed by: PHYSICAL THERAPIST

## 2023-07-10 PROCEDURE — 97530 THERAPEUTIC ACTIVITIES: CPT | Performed by: PHYSICAL THERAPIST

## 2023-07-10 NOTE — PROGRESS NOTES
Daily Note     Today's date: 7/10/2023  Patient name: Natasha Worthy  : 1970  MRN: [id number]  Referring provider: Zoraida Quezada MD  Dx:   Encounter Diagnosis     ICD-10-CM    1. Acute pain of left knee  M25.562                      Subjective: Pt repots that his knee continues to feel achy but otherwise feels he is improving. .      Objective: See treatment diary below      Assessment: rom remains limited but is improved with manual tx. Progressed therex as listed, pt requires occasional railing assistance to maintain balance during SLS. Continue to progress as maicol NV. Plan: Continue per plan of care.       Precautions: hx of R quad tendon repair, L TKA      Manuals 6/5 6/7 6/12 6/14 6/19 6/22 6/26 6/29 7/3 7/6 7/10   prom kl JLW JLW JLW MO CB JLW MM ksg FH kl                                             Neuro Re-Ed                                                                                                                Ther Ex              Heel slides  5"x10 5"x20 5"x20 5"x20 5"x20 5"x20 5"x20 5" x20     Quad set  5"x10 5"x10 5"x10 5"x10 5"x10        slr  min assist  10 CG  10 15 20 x20        Prone TKE      with towel under thigh, combined with HS curls x30 5"x20 TKE prone 5"x20 TKE prone 5"x20  5"x20   laq  10 30 30 30 x30 1# 2.5#  20 3# 20 3# x20 30x4#    Hs curls  10 20 20 20  20 2# 20 3# x20 4#x20    Cone taps  20 ea 20 ea           Hr/tr  20 ea 20 ea 30 ea 30 ea LLE x30 HR hr  30 Hr 30 HR x30 HR 30x x30   gastroc stretch   30"x3  30"x3  30"x3 30"x3 30"x3  30"x3 30"x3 30" x3     step ups    4"   2x10  6"   15 6" 20 6" x20 Fwd  8"   30 8" 30 8" x30 30x 8'' step overs 8"x30 fwd/lat   bike     ROM   5' ROM  5' ROM 5'         standing slr x3     20 ea 20 ea x20 ea 1#  2#   20 ea 2# 20 ea 3# x20 ea     Leg Press         90# 3x10 B/L  40# 3x10 S/L 90# 3x10 B/L  40# 3x10 S/L 90# B/L  40# single x30ea   Lateral step downs          2x10 6'' 6"x20   Quad stretch          5x30'' 30"  x4   lunges x20   sls           30"x5 on airex                               lunges           x20   Ther Activity              bike       8' 8' 8' 8' 8'   Sit to stand       10 10 2x10 2x10 foam under Rt    Gait with cones          5 laps                  Gait Training              prn With ext hang x10'                           Modalities              CP with extension hang

## 2023-07-13 ENCOUNTER — OFFICE VISIT (OUTPATIENT)
Dept: PHYSICAL THERAPY | Facility: CLINIC | Age: 53
End: 2023-07-13
Payer: COMMERCIAL

## 2023-07-13 DIAGNOSIS — M25.562 ACUTE PAIN OF LEFT KNEE: Primary | ICD-10-CM

## 2023-07-13 PROCEDURE — 97140 MANUAL THERAPY 1/> REGIONS: CPT

## 2023-07-13 PROCEDURE — 97110 THERAPEUTIC EXERCISES: CPT

## 2023-07-13 PROCEDURE — 97112 NEUROMUSCULAR REEDUCATION: CPT

## 2023-07-13 NOTE — PROGRESS NOTES
Daily Note     Today's date: 2023  Patient name: Sabine Meza  : 1970  MRN: [id number]  Referring provider: Patrick Carmona MD  Dx:   Encounter Diagnosis     ICD-10-CM    1. Acute pain of left knee  M25.562                      Subjective: Pt repots knee stiffness is improving. Sleeping better at night. Objective: See treatment diary below      Assessment: . Progressed therex as listed. Improved stance time on left LE during dynamic balance activities with improved balance. Able to increase leg press resistance with good tolerance. Continue to progress as maicol NV. Plan: Continue per plan of care.       Precautions: hx of R quad tendon repair, L TKA      Manuals 7/13 6/7 6/12 6/14 6/19 6/22 6/26 6/29 7/3 7/6 7/10   prom ksg JLW JLW JLW MO CB JLW MM ksg FH kl                                             Neuro Re-Ed                                                                                                                Ther Ex              Heel slides  5"x10 5"x20 5"x20 5"x20 5"x20 5"x20 5"x20 5" x20     Quad set  5"x10 5"x10 5"x10 5"x10 5"x10        slr  min assist  10 CG  10 15 20 x20        Prone TKE 5" x20     with towel under thigh, combined with HS curls x30 5"x20 TKE prone 5"x20 TKE prone 5"x20  5"x20   laq 4# x30 10 30 30 30 x30 1# 2.5#  20 3# 20 3# x20 30x4#    Hs curls 4# x30 10 20 20 20  20 2# 20 3# x20 4#x20    Cone taps  20 ea 20 ea           Hr/tr x30 20 ea 20 ea 30 ea 30 ea LLE x30 HR hr  30 Hr 30 HR x30 HR 30x x30   gastroc stretch   30"x3  30"x3  30"x3 30"x3 30"x3  30"x3 30"x3 30" x3     step ups 8" x30 fwd/lat   4"   2x10  6"   15 6" 20 6" x20 Fwd  8"   30 8" 30 8" x30 30x 8'' step overs 8"x30 fwd/lat   bike     ROM   5' ROM  5' ROM 5'         standing slr x3     20 ea 20 ea x20 ea 1#  2#   20 ea 2# 20 ea 3# x20 ea     Leg Press 100# B/L  50# S/L x30 ea        90# 3x10 B/L  40# 3x10 S/L 90# 3x10 B/L  40# 3x10 S/L 90# B/L  40# single x30ea   Lateral step downs 6" x20         2x10 6'' 6"x20   Quad stretch 30"x4         5x30'' 30"  x4   lunges x20          x20   sls 30" x5 on airex          30"x5 on airex                               lunges           x20   Ther Activity              bike 10'      8' 8' 8' 8' 8'   Sit to stand 2x10 foam      10 10 2x10 2x10 foam under Rt    Gait with cones          5 laps    Cone taps, step overs, lateral step overs 4 laps each             Gait Training              prn                            Modalities              CP with extension hang

## 2023-07-17 ENCOUNTER — OFFICE VISIT (OUTPATIENT)
Dept: PHYSICAL THERAPY | Facility: CLINIC | Age: 53
End: 2023-07-17
Payer: COMMERCIAL

## 2023-07-17 DIAGNOSIS — M25.562 ACUTE PAIN OF LEFT KNEE: Primary | ICD-10-CM

## 2023-07-17 PROCEDURE — 97110 THERAPEUTIC EXERCISES: CPT

## 2023-07-17 PROCEDURE — 97140 MANUAL THERAPY 1/> REGIONS: CPT

## 2023-07-17 PROCEDURE — 97530 THERAPEUTIC ACTIVITIES: CPT

## 2023-07-17 NOTE — PROGRESS NOTES
Daily Note     Today's date: 2023  Patient name: Marylu Gonzales  : 1970  MRN: [id number]  Referring provider: Fanny Sanford MD  Dx:   Encounter Diagnosis     ICD-10-CM    1. Acute pain of left knee  M25.562           Start Time: 1215  Stop Time: 1259  Total time in clinic (min): 44 minutes      Subjective: Patient reports his left knee is feeling good. Objective: See treatment diary below. Assessment: Treatment is tolerated well. Left knee strength and ROM improving. Patient may remove compression stocking - will monitor for swelling. Plan: Continue treatment as per PT plan of care.        Precautions: hx of R quad tendon repair, L TKA      Manuals 7/13 7/17  6/14 6/19 6/22 6/26 6/29 7/3 7/6 7/10   prom ksg JLW  JLW MO CB JLW MM ksg  kl                                             Neuro Re-Ed                                                                                                                Ther Ex              Heel slides    5"x20 5"x20 5"x20 5"x20 5"x20 5" x20     Quad set    5"x10 5"x10 5"x10        slr  20  15 20 x20        Prone TKE 5" x20     with towel under thigh, combined with HS curls x30 5"x20 TKE prone 5"x20 TKE prone 5"x20  5"x20   laq 4# x30 4#  30  30 30 x30 1# 2.5#  20 3# 20 3# x20 30x4#    Hs curls 4# x30   20 20  20 2# 20 3# x20 4#x20    Cone taps              Hr/tr x30   30 ea 30 ea LLE x30 HR hr  30 Hr 30 HR x30 HR 30x x30   gastroc stretch      30"x3 30"x3 30"x3  30"x3 30"x3 30" x3     step ups 8" x30 fwd/lat  8"   30  fwd/lat   6"   15 6" 20 6" x20 Fwd  8"   30 8" 30 8" x30 30x 8'' step overs 8"x30 fwd/lat   bike     ROM   5' ROM  5' ROM 5'         standing slr x3     20 ea 20 ea x20 ea 1#  2#   20 ea 2# 20 ea 3# x20 ea     Leg Press 100# B/L  50# S/L x30 ea 100# B/L  50# S/L 30 ea       90# 3x10 B/L  40# 3x10 S/L 90# 3x10 B/L  40# 3x10 S/L 90# B/L  40# single x30ea   Lateral step downs 6" x20  6"   20        2x10 6'' 6"x20   Quad stretch 30"x4 30"x4        5x30'' 30"  x4   lunges x20  20 ea         x20   sls 30" x5 on airex  30"x3         30"x5 on airex                                          x20   Ther Activity              bike 10' 8'     8' 8' 8' 8' 8'   Sit to stand 2x10 foam no foam  20     10 10 2x10 2x10 foam under Rt    Gait with cones          5 laps    Cone taps, step overs, lateral step overs 4 laps each                           Gait Training              prn                            Modalities              CP with extension hang

## 2023-07-20 ENCOUNTER — OFFICE VISIT (OUTPATIENT)
Dept: PHYSICAL THERAPY | Facility: CLINIC | Age: 53
End: 2023-07-20
Payer: COMMERCIAL

## 2023-07-20 DIAGNOSIS — M25.562 ACUTE PAIN OF LEFT KNEE: Primary | ICD-10-CM

## 2023-07-20 PROCEDURE — 97110 THERAPEUTIC EXERCISES: CPT | Performed by: PHYSICAL THERAPIST

## 2023-07-20 PROCEDURE — 97140 MANUAL THERAPY 1/> REGIONS: CPT | Performed by: PHYSICAL THERAPIST

## 2023-07-20 PROCEDURE — 97530 THERAPEUTIC ACTIVITIES: CPT | Performed by: PHYSICAL THERAPIST

## 2023-07-20 NOTE — PROGRESS NOTES
Daily Note     Today's date: 2023  Patient name: Paula Narayanan  : 1970  MRN: [id number]  Referring provider: Hernan Trotter MD  Dx:   Encounter Diagnosis     ICD-10-CM    1. Acute pain of left knee  M25.562                        Subjective: Patient reports that his knee continues to feels stiff but is otherwise improving. Reports no sig increase in his swelling since removing his compression garment. Objective: See treatment diary below. Assessment: progressed therex as listed with no complaints other then fatigue. Continue to progress as maicol NV. Plan: Continue treatment as per PT plan of care.        Precautions: hx of R quad tendon repair, L TKA      Manuals 7/13 7/17 7/20 6/14 6/19 6/22 6/26 6/29 7/3 7/6 7/10   prom ksg JLW kl JLW MO CB JLW MM ksg FH kl                                             Neuro Re-Ed                                                                                                                Ther Ex              Heel slides    5"x20 5"x20 5"x20 5"x20 5"x20 5" x20     Quad set    5"x10 5"x10 5"x10        slr  20  15 20 x20        Prone TKE 5" x20     with towel under thigh, combined with HS curls x30 5"x20 TKE prone 5"x20 TKE prone 5"x20  5"x20   laq 4# x30 4#  30 5#x30 30 30 x30 1# 2.5#  20 3# 20 3# x20 30x4#    Hs curls 4# x30  5#x30 20 20  20 2# 20 3# x20 4#x20    Cone taps              Hr/tr x30   30 ea 30 ea LLE x30 HR hr  30 Hr 30 HR x30 HR 30x x30   gastroc stretch      30"x3 30"x3 30"x3  30"x3 30"x3 30" x3     step ups 8" x30 fwd/lat  8"   30  fwd/lat 8"x30ea  6"   15 6" 20 6" x20 Fwd  8"   30 8" 30 8" x30 30x 8'' step overs 8"x30 fwd/lat   bike     ROM   5' ROM  5' ROM 5'         standing slr x3     20 ea 20 ea x20 ea 1#  2#   20 ea 2# 20 ea 3# x20 ea     Leg Press 100# B/L  50# S/L x30 ea 100# B/L  50# S/L 30 ea 100# B/L 50# SL x30ea      90# 3x10 B/L  40# 3x10 S/L 90# 3x10 B/L  40# 3x10 S/L 90# B/L  40# single x30ea   Lateral step downs 6" x20  6"   20 6"x20       2x10 6'' 6"x20   Quad stretch 30"x4  30"x4        5x30'' 30"  x4   lunges x20  20 ea x30 8#        x20   sls 30" x5 on airex  30"x3 30"x3        30"x5 on airex                                          x20   Ther Activity              bike 10' 8'     8' 8' 8' 8' 8'   Sit to stand 2x10 foam no foam  20     10 10 2x10 2x10 foam under Rt    Gait with cones          5 laps    Cone taps, step overs, lateral step overs 4 laps each                           Gait Training              prn                            Modalities              CP with extension hang

## 2023-07-24 ENCOUNTER — OFFICE VISIT (OUTPATIENT)
Dept: PHYSICAL THERAPY | Facility: CLINIC | Age: 53
End: 2023-07-24
Payer: COMMERCIAL

## 2023-07-24 DIAGNOSIS — M25.562 ACUTE PAIN OF LEFT KNEE: Primary | ICD-10-CM

## 2023-07-24 PROCEDURE — 97530 THERAPEUTIC ACTIVITIES: CPT

## 2023-07-24 PROCEDURE — 97110 THERAPEUTIC EXERCISES: CPT

## 2023-07-24 PROCEDURE — 97140 MANUAL THERAPY 1/> REGIONS: CPT

## 2023-07-24 NOTE — PROGRESS NOTES
Daily Note     Today's date: 2023  Patient name: Spike Romero  : 1970  MRN: [id number]  Referring provider: Marcelo Scales MD  Dx:   Encounter Diagnosis     ICD-10-CM    1. Acute pain of left knee  M25.562           Start Time: 1145  Stop Time: 1228  Total time in clinic (min): 43 minutes       Subjective: Patient reports he did a lot of walking at the [facility] over the weekend and his left knee felt good. Objective: See treatment diary below. Assessment: Patient is doing well with therapeutic exercise program.  Left knee ROM and strength improving. Plan: Continue treatment as per PT plan of care.        Precautions: hx of R quad tendon repair, L TKA      Manuals 7/13 7/17 7/20 7/24  6/22 6/26 6/29 7/3 7/6 7/10   prom ksg JLW kl JLW  CB JLW MM ksg FH kl                                             Neuro Re-Ed                                                                                                                Ther Ex              Heel slides      5"x20 5"x20 5"x20 5" x20     Quad set      5"x10        slr  20    x20        Prone TKE 5" x20     with towel under thigh, combined with HS curls x30 5"x20 TKE prone 5"x20 TKE prone 5"x20  5"x20   laq 4# x30 4#  30 5#x30 5#  30  x30 1# 2.5#  20 3# 20 3# x20 30x4#    Hs curls 4# x30  5#x30 5#  30   20 2# 20 3# x20 4#x20    Cone taps              Hr/tr x30     LLE x30 HR hr  30 Hr 30 HR x30 HR 30x x30   gastroc stretch       30"x3  30"x3 30"x3 30" x3     step ups 8" x30 fwd/lat  8"   30  fwd/lat 8"x30ea 8"   30  fwd/lat  6" x20 Fwd  8"   30 8" 30 8" x30 30x 8'' step overs 8"x30 fwd/lat   bike      ROM 5'         standing slr x3      x20 ea 1#  2#   20 ea 2# 20 ea 3# x20 ea     Leg Press 100# B/L  50# S/L x30 ea 100# B/L  50# S/L 30 ea 100# B/L 50# SL x30ea 100# B/L  50# S/L 30 ea     90# 3x10 B/L  40# 3x10 S/L 90# 3x10 B/L  40# 3x10 S/L 90# B/L  40# single x30ea   Lateral step downs 6" x20  6"   20 6"x20       2x10 6'' 6"x20 Quad stretch 30"x4  30"x4   30"x4      5x30'' 30"  x4   lunges x20  20 ea x30 8#  8# dbs   20 on L       x20   sls 30" x5 on airex  30"x3 30"x3  airex   30"x3       30"x5 on airex                                          x20   Ther Activity              bike 10' 8'  10'   8' 8' 8' 8' 8'   Sit to stand 2x10 foam no foam  20  no foam  20   10 10 2x10 2x10 foam under Rt    Gait with cones          5 laps    Cone taps, step overs, lateral step overs 4 laps each                           Gait Training              prn                            Modalities              CP with extension hang

## 2023-07-27 ENCOUNTER — OFFICE VISIT (OUTPATIENT)
Dept: PHYSICAL THERAPY | Facility: CLINIC | Age: 53
End: 2023-07-27
Payer: COMMERCIAL

## 2023-07-27 DIAGNOSIS — M25.562 ACUTE PAIN OF LEFT KNEE: Primary | ICD-10-CM

## 2023-07-27 PROCEDURE — 97140 MANUAL THERAPY 1/> REGIONS: CPT | Performed by: PHYSICAL THERAPIST

## 2023-07-27 PROCEDURE — 97110 THERAPEUTIC EXERCISES: CPT | Performed by: PHYSICAL THERAPIST

## 2023-07-27 PROCEDURE — 97112 NEUROMUSCULAR REEDUCATION: CPT | Performed by: PHYSICAL THERAPIST

## 2023-07-27 NOTE — PROGRESS NOTES
Daily Note     Today's date: 2023  Patient name: Tom Head  : 1970  MRN: [id number]  Referring provider: Miles Cervantes MD  Dx:   Encounter Diagnosis     ICD-10-CM    1. Acute pain of left knee  M25.562                         Subjective: Pt reports that he has been doing more sitting since returning to work and his knee feels more stiff and swollen, reports that he has resumed wearing his compression garment to address this. Objective: See treatment diary below. Assessment: increased edema noted but no sig impact on rom detected. Encourages pt to continue to wear compression garment    Plan: Continue treatment as per PT plan of care.        Precautions: hx of R quad tendon repair, L TKA      Manuals 7/13 7/17 7/20 7/24 7/27 6/22 6/26 6/29 7/3 7/6 7/10   prom ksg JLW kl JLW kl CB JLW MM ksg FH kl                                             Neuro Re-Ed                                                                                                                Ther Ex              Heel slides      5"x20 5"x20 5"x20 5" x20     Quad set      5"x10        slr  20    x20        Prone TKE 5" x20     with towel under thigh, combined with HS curls x30 5"x20 TKE prone 5"x20 TKE prone 5"x20  5"x20   laq 4# x30 4#  30 5#x30 5#  30 5#x30 x30 1# 2.5#  20 3# 20 3# x20 30x4#    Hs curls 4# x30  5#x30 5#  30 5#x30  20 2# 20 3# x20 4#x20    Cone taps              Hr/tr x30     LLE x30 HR hr  30 Hr 30 HR x30 HR 30x x30   gastroc stretch       30"x3  30"x3 30"x3 30" x3     step ups 8" x30 fwd/lat  8"   30  fwd/lat 8"x30ea 8"   30  fwd/lat bosu fwd/latx30 6" x20 Fwd  8"   30 8" 30 8" x30 30x 8'' step overs 8"x30 fwd/lat   bike      ROM 5'         standing slr x3      x20 ea 1#  2#   20 ea 2# 20 ea 3# x20 ea     Leg Press 100# B/L  50# S/L x30 ea 100# B/L  50# S/L 30 ea 100# B/L 50# SL x30ea 100# B/L  50# S/L 30 ea 100#B/L 50# single leg x30ea    90# 3x10 B/L  40# 3x10 S/L 90# 3x10 B/L  40# 3x10 S/L 90# B/L  40# single x30ea   Lateral step downs 6" x20  6"   20 6"x20       2x10 6'' 6"x20   Quad stretch 30"x4  30"x4   30"x4      5x30'' 30"  x4   lunges x20  20 ea x30 8#  8# dbs   20 on L 8#x20      x20   sls 30" x5 on airex  30"x3 30"x3  airex   30"x3 30"x3      30"x5 on airex                                          x20   Ther Activity              bike 10' 8'  10' 8'  8' 8' 8' 8' 8'   Sit to stand 2x10 foam no foam  20  no foam  20   10 10 2x10 2x10 foam under Rt    Gait with cones          5 laps    Cone taps, step overs, lateral step overs 4 laps each                           Gait Training              prn                            Modalities              CP with extension hang

## 2023-07-31 ENCOUNTER — OFFICE VISIT (OUTPATIENT)
Dept: PHYSICAL THERAPY | Facility: CLINIC | Age: 53
End: 2023-07-31
Payer: COMMERCIAL

## 2023-07-31 DIAGNOSIS — M25.562 ACUTE PAIN OF LEFT KNEE: Primary | ICD-10-CM

## 2023-07-31 PROCEDURE — 97530 THERAPEUTIC ACTIVITIES: CPT | Performed by: PHYSICAL THERAPIST

## 2023-07-31 PROCEDURE — 97140 MANUAL THERAPY 1/> REGIONS: CPT | Performed by: PHYSICAL THERAPIST

## 2023-07-31 PROCEDURE — 97110 THERAPEUTIC EXERCISES: CPT | Performed by: PHYSICAL THERAPIST

## 2023-07-31 NOTE — PROGRESS NOTES
Daily Note     Today's date: 2023  Patient name: Brayden Montemayor  : 1970  MRN: [id number]  Referring provider: Zina Nichols MD  Dx:   Encounter Diagnosis     ICD-10-CM    1. Acute pain of left knee  M25.562                         Subjective: Pt reports that he continues to have swelling and is using his compression garment. Objective: See treatment diary below. Assessment: rom continues to improve. Pt has f/u with ortho later this week. If continued progress consider DC to hep next week. Plan: Continue treatment as per PT plan of care.        Precautions: hx of R quad tendon repair, L TKA      Manuals 7/13 7/17 7/20 7/24 7/27 7/31 6/26 6/29 7/3 7/6 7/10   prom ksg JLW kl JLW kl kl JLW MM ksg FH kl                                             Neuro Re-Ed                                                                                                                Ther Ex              Heel slides      5"x20 5"x20 5"x20 5" x20     Quad set              slr  20            Prone TKE 5" x20      5"x20 TKE prone 5"x20 TKE prone 5"x20  5"x20   laq 4# x30 4#  30 5#x30 5#  30 5#x30 5#x30 2.5#  20 3# 20 3# x20 30x4#    Hs curls 4# x30  5#x30 5#  30 5#x30 5#x30 20 2# 20 3# x20 4#x20    Cone taps              Hr/tr x30      hr  30 Hr 30 HR x30 HR 30x x30   gastroc stretch         30"x3 30"x3 30" x3     step ups 8" x30 fwd/lat  8"   30  fwd/lat 8"x30ea 8"   30  fwd/lat bosu fwd/latx30 bosu x30ea  8"   30 8" 30 8" x30 30x 8'' step overs 8"x30 fwd/lat   bike               standing slr x3        2#   20 ea 2# 20 ea 3# x20 ea     Leg Press 100# B/L  50# S/L x30 ea 100# B/L  50# S/L 30 ea 100# B/L 50# SL x30ea 100# B/L  50# S/L 30 ea 100#B/L 50# single leg x30ea 100/50# x30ea   90# 3x10 B/L  40# 3x10 S/L 90# 3x10 B/L  40# 3x10 S/L 90# B/L  40# single x30ea   Lateral step downs 6" x20  6"   20 6"x20       2x10 6'' 6"x20   Quad stretch 30"x4  30"x4   30"x4      5x30'' 30"  x4   lunges x20  20 ea x30 8#  8# dbs   20 on L 8#x20 10#x20     x20   sls 30" x5 on airex  30"x3 30"x3  airex   30"x3 30"x3 30"x3 on airex     30"x5 on airex                                          x20   Ther Activity              bike 10' 8'  10' 8' 8' 8' 8' 8' 8' 8'   Sit to stand 2x10 foam no foam  20  no foam  20   10 10 2x10 2x10 foam under Rt    Gait with cones          5 laps    Cone taps, step overs, lateral step overs 4 laps each                           Gait Training              prn                            Modalities              CP with extension hang

## 2023-08-03 NOTE — PROGRESS NOTES
Pt reports that he had a f/u with [facility], and that they are pleased with his progress and can be DC'd from PT.

## 2023-08-24 ENCOUNTER — OFFICE VISIT (OUTPATIENT)
Dept: FAMILY MEDICINE CLINIC | Facility: CLINIC | Age: 53
End: 2023-08-24
Payer: COMMERCIAL

## 2023-08-24 VITALS
SYSTOLIC BLOOD PRESSURE: 122 MMHG | HEART RATE: 67 BPM | WEIGHT: 273.5 LBS | OXYGEN SATURATION: 97 % | RESPIRATION RATE: 18 BRPM | HEIGHT: 78 IN | DIASTOLIC BLOOD PRESSURE: 78 MMHG | TEMPERATURE: 97.8 F | BODY MASS INDEX: 31.64 KG/M2

## 2023-08-24 DIAGNOSIS — I10 ESSENTIAL HYPERTENSION: Primary | ICD-10-CM

## 2023-08-24 DIAGNOSIS — Z96.652 STATUS POST TOTAL LEFT KNEE REPLACEMENT: ICD-10-CM

## 2023-08-24 DIAGNOSIS — Z00.00 PERIODIC HEALTH ASSESSMENT, GENERAL SCREENING, ADULT: ICD-10-CM

## 2023-08-24 DIAGNOSIS — R35.1 NOCTURIA: ICD-10-CM

## 2023-08-24 PROBLEM — Z01.818 PREOPERATIVE EVALUATION OF A MEDICAL CONDITION TO RULE OUT SURGICAL CONTRAINDICATIONS (TAR REQUIRED): Status: RESOLVED | Noted: 2023-05-11 | Resolved: 2023-08-24

## 2023-08-24 PROCEDURE — 99396 PREV VISIT EST AGE 40-64: CPT | Performed by: FAMILY MEDICINE

## 2023-08-24 RX ORDER — AMOXICILLIN 500 MG/1
TABLET, FILM COATED ORAL
COMMUNITY
Start: 2023-06-21

## 2023-08-24 RX ORDER — METHOCARBAMOL 750 MG/1
750 TABLET, FILM COATED ORAL EVERY 8 HOURS PRN
COMMUNITY
Start: 2023-06-21

## 2023-08-24 NOTE — PROGRESS NOTES
FAMILY PRACTICE OFFICE VISIT       NAME: Bob Martínez  AGE: 45 y.o. SEX: male       : 1970        MRN: [id number]    DATE: 2023  TIME: 4:50 PM    Assessment and Plan     Problem List Items Addressed This Visit        Cardiovascular and Mediastinum    Essential hypertension - Primary     Hypertension. The patient's blood pressure is stable at this time and he will continue current regimen of medications         Relevant Orders    Lipid panel       Other    Status post total left knee replacement     Left knee replacement surgery. Patient continues to recover from recent knee replacement surgery. He will continue to perform his home exercises as per his physical therapy department. He has follow-up with orthopedist in 2023         Periodic health assessment, general screening, adult     Well adult. Overall the patient appears to be in stable health. He will obtain blood work as ordered for further evaluation. We will make further recommendations pending results of test.  His colonoscopy is up-to-date. Relevant Orders    PSA, Total Screen   Other Visit Diagnoses     Nocturia                  Chief Complaint     Chief Complaint   Patient presents with   • Well Check     PHYSICAL        History of Present Illness     Patient in the office for annual wellness exam.  He recently underwent left total knee replacement surgery this past summer. He continues to have some intermittent discomfort and swelling of left knee. He does wear a thigh-high compression stocking. He is able to ride a stationary bicycle once again but has not returned to bowling as of yet due to the discomfort in the knee. He denies any recent illness. His colonoscopy is up-to-date. I reviewed his most recent blood test results      Review of Systems   Review of Systems   Constitutional: Negative. HENT: Negative. Eyes: Negative. Respiratory: Negative. Cardiovascular: Negative. Gastrointestinal: Negative. Genitourinary: Negative. Musculoskeletal: Positive for arthralgias and gait problem. Skin: Negative. Psychiatric/Behavioral: Negative. Active Problem List     Patient Active Problem List   Diagnosis   • Hypertension   • Esophageal reflux   • Onychomycosis   • Bursitis of heel, right   • Lesion of mouth   • Rupture of right quadriceps tendon   • Obesity (BMI 30-39. 9)   • Chronic pain of both knees   • Hematochezia   • Chest pain   • Essential hypertension   • Status post total left knee replacement   • Periodic health assessment, general screening, adult       Past Medical History:  Past Medical History:   Diagnosis Date   • Acute meniscal tear of left knee    • Arthritis    • Carpal tunnel syndrome, unspecified upper limb    • Hypercholesterolemia    • Hypertension    • Morbid obesity ([address])        Past Surgical History:  Past Surgical History:   Procedure Laterality Date   • ARTHROSCOPY KNEE     • QUADRICEPS REPAIR         Family History:  Family History   Problem Relation Age of Onset   • Atrial fibrillation Mother         CARDIAC PACEMAKER   • Parkinsonism Mother    • Bleeding Disorder Father         ACTIVE INTERNAL BLEEDING   • Liver disease Father         LIVER DAMAGE       Social History:  Social History     Socioeconomic History   • Marital status: Single     Spouse name: Not on file   • Number of children: Not on file   • Years of education: Not on file   • Highest education level: Not on file   Occupational History   • Not on file   Tobacco Use   • Smoking status: Never   • Smokeless tobacco: Never   Vaping Use   • Vaping Use: Never used   Substance and Sexual Activity   • Alcohol use: No   • Drug use: No   • Sexual activity: Yes   Other Topics Concern   • Not on file   Social History Narrative   • Not on file     Social Determinants of Health     Financial Resource Strain: Not on file   Food Insecurity: Not on file   Transportation Needs: Not on file Physical Activity: Not on file   Stress: Not on file   Social Connections: Not on file   Intimate Partner Violence: Not on file   Housing Stability: Not on file       Objective     Vitals:    08/24/23 1548   BP: 122/78   Pulse: 67   Resp: 18   Temp: 97.8 °F (36.6 °C)   SpO2: 97%     Wt Readings from Last 3 Encounters:   08/24/23 124 kg (273 lb 8 oz)   05/11/23 123 kg (270 lb 8 oz)   02/20/23 127 kg (280 lb 2 oz)       Physical Exam  Constitutional:       General: He is not in acute distress. Appearance: Normal appearance. He is not ill-appearing. HENT:      Head: Normocephalic and atraumatic. Eyes:      General:         Right eye: No discharge. Left eye: No discharge. Conjunctiva/sclera: Conjunctivae normal.      Pupils: Pupils are equal, round, and reactive to light. Neck:      Vascular: No carotid bruit. Cardiovascular:      Rate and Rhythm: Normal rate and regular rhythm. Heart sounds: Normal heart sounds. No murmur heard. Pulmonary:      Effort: Pulmonary effort is normal.      Breath sounds: Normal breath sounds. No wheezing, rhonchi or rales. Abdominal:      General: Abdomen is flat. Bowel sounds are normal. There is no distension. Palpations: Abdomen is soft. Tenderness: There is no abdominal tenderness. There is no guarding or rebound. Musculoskeletal:         General: Swelling present. Right lower leg: No edema. Left lower leg: No edema. Comments: Left knee joint appears swollen compared to right normal range of motion. Lymphadenopathy:      Cervical: No cervical adenopathy. Skin:     Findings: No rash. Neurological:      General: No focal deficit present. Mental Status: He is alert and oriented to person, place, and time. Cranial Nerves: No cranial nerve deficit. Psychiatric:         Mood and Affect: Mood normal.         Behavior: Behavior normal.         Thought Content:  Thought content normal.         Judgment: Judgment normal.         Pertinent Laboratory/Diagnostic Studies:  Lab Results   Component Value Date    GLUCOSE 84 10/17/2013    BUN 12 08/06/2022    CREATININE 1.05 08/06/2022    CALCIUM 9.5 08/06/2022     02/18/2017    K 4.7 08/06/2022    CO2 30 08/06/2022     08/06/2022     Lab Results   Component Value Date    ALT 16 08/06/2022    AST 19 08/06/2022    ALKPHOS 62 08/06/2022    BILITOT 0.6 02/18/2017       Lab Results   Component Value Date    WBC 5.0 08/06/2022    HGB 14.9 08/06/2022    HCT 46.4 08/06/2022    MCV 91.5 08/06/2022     08/06/2022       Lab Results   Component Value Date    TSH 2.29 08/06/2022       Lab Results   Component Value Date    CHOL 175 10/17/2013     Lab Results   Component Value Date    TRIG 90 08/06/2022     Lab Results   Component Value Date    HDL 46 08/06/2022     Lab Results   Component Value Date    LDLCALC 109 (H) 08/06/2022     No results found for: "HGBA1C"    Results for orders placed or performed in visit on 12/06/22   Cov/Flu-Collected at [facility] or [facility]    Specimen: Nose; Nares   Result Value Ref Range    SARS-CoV-2 Positive (A) Negative    INFLUENZA A PCR Negative Negative    INFLUENZA B PCR Negative Negative       Orders Placed This Encounter   Procedures   • PSA, Total Screen   • Lipid panel       ALLERGIES:  No Known Allergies    Current Medications     Current Outpatient Medications   Medication Sig Dispense Refill   • amoxicillin (AMOXIL) 500 MG tablet TAKE 4 TABLETS BY MOUTH 1 HOUR PRIOR TO DENTAL APPOINTMENT     • methocarbamol (ROBAXIN) 750 mg tablet Take 750 mg by mouth every 8 (eight) hours as needed     • valsartan-hydrochlorothiazide (DIOVAN-HCT) 80-12.5 MG per tablet Take 1 tablet by mouth daily 90 tablet 3     No current facility-administered medications for this visit.          Health Maintenance     Health Maintenance   Topic Date Due   • Hepatitis C Screening  Never done   • HIV Screening  Never done   • DTaP,Tdap,and Td Vaccines (1 - Tdap) Never done   • BMI: Followup Plan  03/08/2022   • Annual Physical  08/18/2023   • Influenza Vaccine (1) 09/01/2023   • BMI: Adult  05/11/2024   • Depression Screening  08/24/2024   • Colorectal Cancer Screening  11/04/2032   • COVID-19 Vaccine  Completed   • Pneumococcal Vaccine: Pediatrics (0 to 5 Years) and At-Risk Patients (6 to 59 Years)  Aged Out   • HIB Vaccine  Aged Out   • IPV Vaccine  Aged Out   • Hepatitis A Vaccine  Aged Out   • Meningococcal ACWY Vaccine  Aged Out   • HPV Vaccine  Aged [employer] History   Administered Date(s) Administered   • COVID-19 MODERNA VACC 0.5 ML IM 01/21/2021, 02/19/2021, 11/11/2021, 05/27/2022   • COVID-19 Moderna Vac BIVALENT 12 Yr+ IM (BOOSTER ONLY) 0.5 ML 11/14/2022   • INFLUENZA 10/23/2013, 10/01/2018   • Influenza, injectable, quadrivalent, preservative free 0.5 mL 09/19/2020   • Influenza, recombinant, quadrivalent,injectable, preservative free 11/05/2019, 10/09/2021   • Influenza, seasonal, injectable 10/04/2016   • Influenza, seasonal, injectable, preservative free 10/01/2018   • Zoster Vaccine Recombinant 04/26/2022, 90/49/1097       Clarene Duverney, MD

## 2023-10-23 PROBLEM — Z00.00 PERIODIC HEALTH ASSESSMENT, GENERAL SCREENING, ADULT: Status: RESOLVED | Noted: 2023-08-24 | Resolved: 2023-10-23

## 2024-01-12 ENCOUNTER — OFFICE VISIT (OUTPATIENT)
Dept: FAMILY MEDICINE CLINIC | Facility: CLINIC | Age: 54
End: 2024-01-12
Payer: COMMERCIAL

## 2024-01-12 VITALS
WEIGHT: 278.6 LBS | HEART RATE: 72 BPM | OXYGEN SATURATION: 98 % | SYSTOLIC BLOOD PRESSURE: 134 MMHG | TEMPERATURE: 97.8 F | HEIGHT: 73 IN | RESPIRATION RATE: 18 BRPM | DIASTOLIC BLOOD PRESSURE: 72 MMHG | BODY MASS INDEX: 36.92 KG/M2

## 2024-01-12 DIAGNOSIS — B35.9 TINEA: ICD-10-CM

## 2024-01-12 DIAGNOSIS — U07.1 COVID-19 VIRUS INFECTION: Primary | ICD-10-CM

## 2024-01-12 LAB
SARS-COV-2 AG UPPER RESP QL IA: POSITIVE
VALID CONTROL: ABNORMAL

## 2024-01-12 PROCEDURE — 99213 OFFICE O/P EST LOW 20 MIN: CPT | Performed by: FAMILY MEDICINE

## 2024-01-12 PROCEDURE — 87811 SARS-COV-2 COVID19 W/OPTIC: CPT | Performed by: FAMILY MEDICINE

## 2024-01-12 RX ORDER — CLOTRIMAZOLE AND BETAMETHASONE DIPROPIONATE 10; .64 MG/G; MG/G
CREAM TOPICAL 2 TIMES DAILY
Qty: 45 G | Refills: 0 | Status: SHIPPED | OUTPATIENT
Start: 2024-01-12

## 2024-01-14 PROBLEM — U07.1 COVID-19 VIRUS INFECTION: Status: ACTIVE | Noted: 2024-01-14

## 2024-01-14 PROBLEM — B35.9 TINEA: Status: ACTIVE | Noted: 2024-01-14

## 2024-01-14 NOTE — ASSESSMENT & PLAN NOTE
Tinea. Pt was given Lotrisone cream to apply twice daily to rash. He will call if sx's persist after 2 weeks use

## 2024-01-14 NOTE — ASSESSMENT & PLAN NOTE
Covid 19 infection. Pt declined Paxlovid. He will use OTC cough med, tylenol, and stay hydrated. He will call if any new sx's develop.

## 2024-01-14 NOTE — PROGRESS NOTES
FAMILY PRACTICE OFFICE VISIT       NAME: Bob Martínez  AGE: 53 y.o. SEX: male       : 1970        MRN: [id number]    DATE: 2024  TIME: 11:59 AM    Assessment and Plan     Problem List Items Addressed This Visit       COVID-19 virus infection - Primary     Covid 19 infection. Pt declined Paxlovid. He will use OTC cough med, tylenol, and stay hydrated. He will call if any new sx's develop.         Relevant Orders    POCT Rapid Covid Ag (Completed)    Tinea     Tinea. Pt was given Lotrisone cream to apply twice daily to rash. He will call if sx's persist after 2 weeks use         Relevant Medications    clotrimazole-betamethasone (LOTRISONE) 1-0.05 % cream           Chief Complaint     Chief Complaint   Patient presents with    Cold Like Symptoms     Congestion and sore throat  Runny nose, cough   Patient states its been for 2 days        History of Present Illness     Pt states he attended a football game recently prior to developing sx's of fatigue, sore throat, mild cough, and myalgia. He denies any CP,SOB,fevers.    Pt also has rash on L knee which is mildly pruritic.        Review of Systems   Review of Systems   Constitutional:  Positive for fatigue. Negative for fever.   HENT:  Positive for congestion and sore throat.    Respiratory:  Positive for cough.    Cardiovascular: Negative.    Gastrointestinal: Negative.    Musculoskeletal:  Positive for myalgias.   Skin:  Positive for rash.       Active Problem List     Patient Active Problem List   Diagnosis    Hypertension    Esophageal reflux    Onychomycosis    Bursitis of heel, right    Lesion of mouth    Rupture of right quadriceps tendon    Obesity (BMI 30-39.9)    Chronic pain of both knees    Hematochezia    Chest pain    Essential hypertension    Status post total left knee replacement    COVID-19 virus infection    Tinea       Past Medical History:  Past Medical History:   Diagnosis Date    Acute meniscal tear of left knee     Arthritis      Carpal tunnel syndrome, unspecified upper limb     Hypercholesterolemia     Hypertension     Morbid obesity (HCC)        Past Surgical History:  Past Surgical History:   Procedure Laterality Date    ARTHROSCOPY KNEE      QUADRICEPS REPAIR         Family History:  Family History   Problem Relation Age of Onset    Atrial fibrillation Mother         CARDIAC PACEMAKER    Parkinsonism Mother     Bleeding Disorder Father         ACTIVE INTERNAL BLEEDING    Liver disease Father         LIVER DAMAGE       Social History:  Social History     Socioeconomic History    Marital status: Single     Spouse name: Not on file    Number of children: Not on file    Years of education: Not on file    Highest education level: Not on file   Occupational History    Not on file   Tobacco Use    Smoking status: Never    Smokeless tobacco: Never   Vaping Use    Vaping status: Never Used   Substance and Sexual Activity    Alcohol use: No    Drug use: No    Sexual activity: Yes   Other Topics Concern    Not on file   Social History Narrative    Not on file     Social Determinants of Health     Financial Resource Strain: Low Risk  (6/1/2023)    Received from [facility]    Overall Financial Resource Strain (CARDIA)     Difficulty of Paying Living Expenses: Not hard at all   Food Insecurity: No Food Insecurity (6/1/2023)    Received from [facility]    Hunger Vital Sign     Worried About Running Out of Food in the Last Year: Never true     Ran Out of Food in the Last Year: Never true   Transportation Needs: No Transportation Needs (6/1/2023)    Received from [facility]    PRAPARE - Transportation     Lack of Transportation (Medical): No     Lack of Transportation (Non-Medical): No   Physical Activity: Not on file   Stress: Not on file   Social Connections: Not on file   Intimate Partner Violence: Not At Risk (6/1/2023)    Received from [facility]    Humiliation, Afraid,  Rape, and Kick questionnaire     Fear of Current or Ex-Partner: No     Emotionally Abused: No     Physically Abused: No     Sexually Abused: No   Housing Stability: Low Risk  (6/1/2023)    Received from [facility]    Housing Stability Vital Sign     Unable to Pay for Housing in the Last Year: No     Number of Places Lived in the Last Year: 1     Unstable Housing in the Last Year: No       Objective     Vitals:    01/12/24 1528   BP: 134/72   Pulse: 72   Resp: 18   Temp: 97.8 °F (36.6 °C)   SpO2: 98%     Wt Readings from Last 3 Encounters:   01/12/24 126 kg (278 lb 9.6 oz)   08/24/23 124 kg (273 lb 8 oz)   05/11/23 123 kg (270 lb 8 oz)       Physical Exam  Constitutional:       General: He is not in acute distress.     Appearance: Normal appearance. He is not ill-appearing.   HENT:      Head: Normocephalic and atraumatic.   Eyes:      General:         Right eye: No discharge.         Left eye: No discharge.      Conjunctiva/sclera: Conjunctivae normal.      Pupils: Pupils are equal, round, and reactive to light.   Neck:      Vascular: No carotid bruit.   Cardiovascular:      Rate and Rhythm: Normal rate and regular rhythm.      Heart sounds: Normal heart sounds. No murmur heard.  Pulmonary:      Effort: Pulmonary effort is normal.      Breath sounds: Normal breath sounds. No wheezing, rhonchi or rales.   Musculoskeletal:      Right lower leg: No edema.      Left lower leg: No edema.   Lymphadenopathy:      Cervical: No cervical adenopathy.   Skin:     Findings: Rash present.      Comments: Pt with a few scattered annular pink papules with pink border.   Neurological:      General: No focal deficit present.      Mental Status: He is alert and oriented to person, place, and time.      Cranial Nerves: No cranial nerve deficit.   Psychiatric:         Mood and Affect: Mood normal.         Behavior: Behavior normal.         Thought Content: Thought content normal.         Judgment: Judgment normal.  "        Pertinent Laboratory/Diagnostic Studies:  Lab Results   Component Value Date    GLUCOSE 84 10/17/2013    BUN 12 08/06/2022    CREATININE 1.05 08/06/2022    CALCIUM 9.5 08/06/2022     02/18/2017    K 4.7 08/06/2022    CO2 30 08/06/2022     08/06/2022     Lab Results   Component Value Date    ALT 16 08/06/2022    AST 19 08/06/2022    ALKPHOS 62 08/06/2022    BILITOT 0.6 02/18/2017       Lab Results   Component Value Date    WBC 5.0 08/06/2022    HGB 14.9 08/06/2022    HCT 46.4 08/06/2022    MCV 91.5 08/06/2022     08/06/2022       Lab Results   Component Value Date    TSH 2.29 08/06/2022       Lab Results   Component Value Date    CHOL 175 10/17/2013     Lab Results   Component Value Date    TRIG 90 08/06/2022     Lab Results   Component Value Date    HDL 46 08/06/2022     Lab Results   Component Value Date    LDLCALC 109 (H) 08/06/2022     No results found for: \"HGBA1C\"    Results for orders placed or performed in visit on 01/12/24   POCT Rapid Covid Ag   Result Value Ref Range    POCT SARS-CoV-2 Ag Positive (A) Negative    VALID CONTROL Valid        Orders Placed This Encounter   Procedures    POCT Rapid Covid Ag       ALLERGIES:  No Known Allergies    Current Medications     Current Outpatient Medications   Medication Sig Dispense Refill    clotrimazole-betamethasone (LOTRISONE) 1-0.05 % cream Apply topically 2 (two) times a day 45 g 0    valsartan-hydrochlorothiazide (DIOVAN-HCT) 80-12.5 MG per tablet Take 1 tablet by mouth daily 90 tablet 3    amoxicillin (AMOXIL) 500 MG tablet TAKE 4 TABLETS BY MOUTH 1 HOUR PRIOR TO DENTAL APPOINTMENT (Patient not taking: Reported on 1/12/2024)       No current facility-administered medications for this visit.         Health Maintenance     Health Maintenance   Topic Date Due    Hepatitis C Screening  Never done    HIV Screening  Never done    DTaP,Tdap,and Td Vaccines (1 - Tdap) Never done    Influenza Vaccine (1) 09/01/2023    COVID-19 Vaccine (6 - " 2023-24 season) 09/01/2023    Annual Physical  08/24/2024    Depression Screening  01/12/2025    Colorectal Cancer Screening  11/04/2032    Zoster Vaccine  Completed    Pneumococcal Vaccine: Pediatrics (0 to 5 Years) and At-Risk Patients (6 to 64 Years)  Aged Out    HIB Vaccine  Aged Out    IPV Vaccine  Aged Out    Hepatitis A Vaccine  Aged Out    Meningococcal ACWY Vaccine  Aged Out    HPV Vaccine  Aged Out     Immunization History   Administered Date(s) Administered    COVID-19 MODERNA VACC 0.5 ML IM 01/21/2021, 02/19/2021, 11/11/2021, 05/27/2022    COVID-19 Moderna Vac BIVALENT 12 Yr+ IM 0.5 ML 11/14/2022    INFLUENZA 10/23/2013, 10/01/2018    Influenza, injectable, quadrivalent, preservative free 0.5 mL 09/19/2020    Influenza, recombinant, quadrivalent,injectable, preservative free 11/05/2019, 10/09/2021    Influenza, seasonal, injectable 10/04/2016    Influenza, seasonal, injectable, preservative free 10/01/2018    Zoster Vaccine Recombinant 04/26/2022, 08/18/2022       Ward Muñiz MD

## 2024-02-25 LAB
CHOLEST SERPL-MCNC: 177 MG/DL
CHOLEST/HDLC SERPL: 4.3 (CALC)
HDLC SERPL-MCNC: 41 MG/DL
LDLC SERPL CALC-MCNC: 120 MG/DL (CALC)
NONHDLC SERPL-MCNC: 136 MG/DL (CALC)
PSA SERPL-MCNC: 0.82 NG/ML
TRIGL SERPL-MCNC: 72 MG/DL

## 2024-03-11 ENCOUNTER — OFFICE VISIT (OUTPATIENT)
Dept: FAMILY MEDICINE CLINIC | Facility: CLINIC | Age: 54
End: 2024-03-11
Payer: COMMERCIAL

## 2024-03-11 VITALS
TEMPERATURE: 98.4 F | RESPIRATION RATE: 18 BRPM | HEART RATE: 68 BPM | WEIGHT: 283.6 LBS | BODY MASS INDEX: 37.59 KG/M2 | SYSTOLIC BLOOD PRESSURE: 138 MMHG | HEIGHT: 73 IN | OXYGEN SATURATION: 98 % | DIASTOLIC BLOOD PRESSURE: 74 MMHG

## 2024-03-11 DIAGNOSIS — I10 PRIMARY HYPERTENSION: Primary | ICD-10-CM

## 2024-03-11 DIAGNOSIS — R10.9 ABDOMINAL PAIN, UNSPECIFIED ABDOMINAL LOCATION: ICD-10-CM

## 2024-03-11 DIAGNOSIS — Z96.652 STATUS POST TOTAL LEFT KNEE REPLACEMENT: ICD-10-CM

## 2024-03-11 PROCEDURE — 99214 OFFICE O/P EST MOD 30 MIN: CPT | Performed by: FAMILY MEDICINE

## 2024-03-11 RX ORDER — AMOXICILLIN 500 MG/1
TABLET, FILM COATED ORAL
Qty: 8 TABLET | Refills: 3 | Status: SHIPPED | OUTPATIENT
Start: 2024-03-11 | End: 2024-03-29

## 2024-03-11 NOTE — ASSESSMENT & PLAN NOTE
Status post left total knee replacement surgery.  Patient was encouraged to restart using his stationary bicycle to try and improve knee movement to prevent stiffness and any discomfort.  Patient will follow-up with orthopedist as scheduled

## 2024-03-11 NOTE — ASSESSMENT & PLAN NOTE
Hypertension.  The patient's blood pressure is stable at this time and he will continue current regimen of medications.  Patient was encouraged to try and lose weight to better control his blood pressure.

## 2024-03-11 NOTE — PROGRESS NOTES
FAMILY PRACTICE OFFICE VISIT       NAME: Bob Martínez  AGE: 53 y.o. SEX: male       : 1970        MRN: [id number]    DATE: 3/11/2024  TIME: 4:56 PM    Assessment and Plan     Problem List Items Addressed This Visit       Hypertension - Primary     Hypertension.  The patient's blood pressure is stable at this time and he will continue current regimen of medications.  Patient was encouraged to try and lose weight to better control his blood pressure.         Status post total left knee replacement     Status post left total knee replacement surgery.  Patient was encouraged to restart using his stationary bicycle to try and improve knee movement to prevent stiffness and any discomfort.  Patient will follow-up with orthopedist as scheduled         Relevant Medications    amoxicillin (AMOXIL) 500 MG tablet    Abdominal pain     Abdominal discomfort.  Patient with a mild discomfort for over 12 months.  He does not identify any specific triggers.  He was given a prescription to obtain abdominal ultrasound for further evaluation.  We will make further recommendations pending results of test.         Relevant Orders    US abdomen complete           Chief Complaint     Chief Complaint   Patient presents with    Follow-up     6 months       History of Present Illness     Patient in the office to review chronic medical condition.  He denies any recent illness.  He states for the past 12 months patient has been having chronic intermittent left upper quadrant abdominal discomfort.  Symptoms are described as a mild ache which may be present almost on a daily basis but resolves spontaneously after few minutes without any intervention.  Patient denies any nausea or vomiting, melena or hematochezia.  He has gained 10 pounds in the last 6 months due to decreased activities.    Patient continues to have some left knee swelling after total knee replacement surgery 9 months ago.  Patient does work from home and sits at a  [employer] throughout the day while working.  He does experience some knee stiffness upon ambulating but is able to bowl several games at a time once a week without much discomfort in his knee.        Review of Systems   Review of Systems   Constitutional: Negative.    HENT: Negative.     Respiratory: Negative.     Cardiovascular: Negative.    Gastrointestinal:  Positive for abdominal pain.        As per HPI     Genitourinary: Negative.    Musculoskeletal:         As per HPI   Skin: Negative.    Neurological: Negative.    Psychiatric/Behavioral: Negative.         Active Problem List     Patient Active Problem List   Diagnosis    Hypertension    Esophageal reflux    Onychomycosis    Bursitis of heel, right    Lesion of mouth    Rupture of right quadriceps tendon    Obesity (BMI 30-39.9)    Chronic pain of both knees    Hematochezia    Chest pain    Essential hypertension    Status post total left knee replacement    COVID-19 virus infection    Tinea    Abdominal pain       Past Medical History:  Past Medical History:   Diagnosis Date    Acute meniscal tear of left knee     Arthritis     Carpal tunnel syndrome, unspecified upper limb     Hypercholesterolemia     Hypertension     Morbid obesity (HCC)        Past Surgical History:  Past Surgical History:   Procedure Laterality Date    ARTHROSCOPY KNEE      QUADRICEPS REPAIR         Family History:  Family History   Problem Relation Age of Onset    Atrial fibrillation Mother         CARDIAC PACEMAKER    Parkinsonism Mother     Bleeding Disorder Father         ACTIVE INTERNAL BLEEDING    Liver disease Father         LIVER DAMAGE       Social History:  Social History     Socioeconomic History    Marital status: Single     Spouse name: Not on file    Number of children: Not on file    Years of education: Not on file    Highest education level: Not on file   Occupational History    Not on file   Tobacco Use    Smoking status: Never    Smokeless tobacco: Never   Vaping Use     Vaping status: Never Used   Substance and Sexual Activity    Alcohol use: No    Drug use: No    Sexual activity: Yes   Other Topics Concern    Not on file   Social History Narrative    Not on file     Social Determinants of Health     Financial Resource Strain: Low Risk  (6/1/2023)    Received from [facility]    Overall Financial Resource Strain (CARDIA)     Difficulty of Paying Living Expenses: Not hard at all   Food Insecurity: No Food Insecurity (6/1/2023)    Received from [facility]    Hunger Vital Sign     Worried About Running Out of Food in the Last Year: Never true     Ran Out of Food in the Last Year: Never true   Transportation Needs: No Transportation Needs (6/1/2023)    Received from [facility]    PRAPARE - Transportation     Lack of Transportation (Medical): No     Lack of Transportation (Non-Medical): No   Physical Activity: Not on file   Stress: Not on file   Social Connections: Not on file   Intimate Partner Violence: Not At Risk (6/1/2023)    Received from [facility]    Humiliation, Afraid, Rape, and Kick questionnaire     Fear of Current or Ex-Partner: No     Emotionally Abused: No     Physically Abused: No     Sexually Abused: No   Housing Stability: Low Risk  (6/1/2023)    Received from [facility]    Housing Stability Vital Sign     Unable to Pay for Housing in the Last Year: No     Number of Places Lived in the Last Year: 1     Unstable Housing in the Last Year: No       Objective     Vitals:    03/11/24 1553   BP: 138/74   Pulse: 68   Resp: 18   Temp: 98.4 °F (36.9 °C)   SpO2: 98%     Wt Readings from Last 3 Encounters:   03/11/24 129 kg (283 lb 9.6 oz)   01/12/24 126 kg (278 lb 9.6 oz)   08/24/23 124 kg (273 lb 8 oz)       Physical Exam  Constitutional:       General: He is not in acute distress.     Appearance: Normal appearance. He is not ill-appearing.   HENT:      Head: Normocephalic and atraumatic.    Eyes:      General:         Right eye: No discharge.         Left eye: No discharge.      Extraocular Movements: Extraocular movements intact.      Conjunctiva/sclera: Conjunctivae normal.      Pupils: Pupils are equal, round, and reactive to light.   Neck:      Vascular: No carotid bruit.   Cardiovascular:      Rate and Rhythm: Normal rate and regular rhythm.      Heart sounds: Normal heart sounds. No murmur heard.  Pulmonary:      Effort: Pulmonary effort is normal.      Breath sounds: Normal breath sounds. No wheezing, rhonchi or rales.   Abdominal:      General: Abdomen is flat. Bowel sounds are normal. There is no distension.      Palpations: Abdomen is soft.      Tenderness: There is no abdominal tenderness. There is no guarding or rebound.   Musculoskeletal:      Right lower leg: No edema.      Left lower leg: No edema.   Lymphadenopathy:      Cervical: No cervical adenopathy.   Skin:     Findings: No rash.   Neurological:      General: No focal deficit present.      Mental Status: He is alert and oriented to person, place, and time.      Cranial Nerves: No cranial nerve deficit.   Psychiatric:         Mood and Affect: Mood normal.         Behavior: Behavior normal.         Thought Content: Thought content normal.         Judgment: Judgment normal.         Pertinent Laboratory/Diagnostic Studies:  Lab Results   Component Value Date    GLUCOSE 84 10/17/2013    BUN 13 06/02/2023    CREATININE 0.97 06/02/2023    CALCIUM 9.0 06/02/2023     02/18/2017    K 4.4 06/02/2023    CO2 21 06/02/2023     06/02/2023     Lab Results   Component Value Date    ALT 20 05/06/2023    AST 21 05/06/2023    ALKPHOS 53 05/06/2023    BILITOT 0.6 02/18/2017       Lab Results   Component Value Date    WBC 5.0 08/06/2022    HGB 14.9 08/06/2022    HCT 46.4 08/06/2022    MCV 91.5 08/06/2022     08/06/2022       Lab Results   Component Value Date    TSH 2.29 08/06/2022       Lab Results   Component Value Date    CHOL  "175 10/17/2013     Lab Results   Component Value Date    TRIG 72 02/24/2024     Lab Results   Component Value Date    HDL 41 02/24/2024     Lab Results   Component Value Date    LDLCALC 120 (H) 02/24/2024     No results found for: \"HGBA1C\"    Results for orders placed or performed in visit on 02/24/24   Lipid panel   Result Value Ref Range    Total Cholesterol 177 <200 mg/dL    HDL 41 > OR = 40 mg/dL    Triglycerides 72 <150 mg/dL    LDL Calculated 120 (H) mg/dL (calc)    Chol HDLC Ratio 4.3 <5.0 (calc)    Non-HDL Cholesterol 136 (H) <130 mg/dL (calc)   PSA, Total Screen   Result Value Ref Range    Prostate Specific Antigen Total 0.82 < OR = 4.00 ng/mL       Orders Placed This Encounter   Procedures    US abdomen complete       ALLERGIES:  No Known Allergies    Current Medications     Current Outpatient Medications   Medication Sig Dispense Refill    amoxicillin (AMOXIL) 500 MG tablet Take 4 tabs one prior to dental visits 8 tablet 3    clotrimazole-betamethasone (LOTRISONE) 1-0.05 % cream Apply topically 2 (two) times a day 45 g 0    valsartan-hydrochlorothiazide (DIOVAN-HCT) 80-12.5 MG per tablet Take 1 tablet by mouth daily 90 tablet 3     No current facility-administered medications for this visit.         Health Maintenance     Health Maintenance   Topic Date Due    Hepatitis C Screening  Never done    HIV Screening  Never done    DTaP,Tdap,and Td Vaccines (1 - Tdap) Never done    Influenza Vaccine (1) 09/01/2023    COVID-19 Vaccine (6 - 2023-24 season) 09/01/2023    Annual Physical  08/24/2024    Depression Screening  01/12/2025    Colorectal Cancer Screening  11/04/2032    Zoster Vaccine  Completed    Pneumococcal Vaccine: Pediatrics (0 to 5 Years) and At-Risk Patients (6 to 64 Years)  Aged Out    HIB Vaccine  Aged Out    IPV Vaccine  Aged Out    Hepatitis A Vaccine  Aged Out    Meningococcal ACWY Vaccine  Aged Out    HPV Vaccine  Aged Out     Immunization History   Administered Date(s) Administered    " COVID-19 MODERNA VACC 0.5 ML IM 01/21/2021, 02/19/2021, 11/11/2021, 05/27/2022    COVID-19 Moderna Vac BIVALENT 12 Yr+ IM 0.5 ML 11/14/2022    INFLUENZA 10/23/2013, 10/01/2018    Influenza, injectable, quadrivalent, preservative free 0.5 mL 09/19/2020    Influenza, recombinant, quadrivalent,injectable, preservative free 11/05/2019, 10/09/2021    Influenza, seasonal, injectable 10/04/2016    Influenza, seasonal, injectable, preservative free 10/01/2018    Zoster Vaccine Recombinant 04/26/2022, 08/18/2022       Ward Muñiz MD    I spent 30 minutes with this patient of which greater than 50% was spent counseling or reviewing chart

## 2024-03-11 NOTE — ASSESSMENT & PLAN NOTE
Abdominal discomfort.  Patient with a mild discomfort for over 12 months.  He does not identify any specific triggers.  He was given a prescription to obtain abdominal ultrasound for further evaluation.  We will make further recommendations pending results of test.

## 2024-03-13 ENCOUNTER — HOSPITAL ENCOUNTER (OUTPATIENT)
Dept: RADIOLOGY | Facility: HOSPITAL | Age: 54
Discharge: HOME | End: 2024-03-13
Payer: COMMERCIAL

## 2024-03-13 DIAGNOSIS — I10 ESSENTIAL HYPERTENSION: ICD-10-CM

## 2024-03-13 DIAGNOSIS — R10.9 ABDOMINAL PAIN, UNSPECIFIED ABDOMINAL LOCATION: ICD-10-CM

## 2024-03-13 PROCEDURE — 76700 US EXAM ABDOM COMPLETE: CPT

## 2024-03-13 RX ORDER — VALSARTAN AND HYDROCHLOROTHIAZIDE 80; 12.5 MG/1; MG/1
1 TABLET, FILM COATED ORAL DAILY
Qty: 90 TABLET | Refills: 3 | Status: SHIPPED | OUTPATIENT
Start: 2024-03-13

## 2024-03-21 ENCOUNTER — TELEPHONE (OUTPATIENT)
Dept: FAMILY MEDICINE CLINIC | Facility: CLINIC | Age: 54
End: 2024-03-21

## 2024-03-21 DIAGNOSIS — E88.89 STEATOSIS (HCC): Primary | ICD-10-CM

## 2024-03-21 NOTE — TELEPHONE ENCOUNTER
----- Message from Ward Muñiz MD sent at 3/21/2024  6:17 AM EDT -----  Recent ultrasound does show some mild liver enlargement with fatty deposits in the liver.  Although this probably would not be the cause of his abdominal symptoms he should discuss his symptoms and have consultation with . [facility] for further evaluation and treatment options for fatty liver.  I will place referral in epic and he may call for appointment.  Please provide phone number

## 2024-08-20 ENCOUNTER — RA CDI HCC (OUTPATIENT)
Dept: OTHER | Facility: HOSPITAL | Age: 54
End: 2024-08-20

## 2024-08-26 ENCOUNTER — OFFICE VISIT (OUTPATIENT)
Dept: FAMILY MEDICINE CLINIC | Facility: CLINIC | Age: 54
End: 2024-08-26
Payer: COMMERCIAL

## 2024-08-26 VITALS
WEIGHT: 285.5 LBS | BODY MASS INDEX: 37.84 KG/M2 | DIASTOLIC BLOOD PRESSURE: 80 MMHG | HEART RATE: 67 BPM | SYSTOLIC BLOOD PRESSURE: 120 MMHG | TEMPERATURE: 97.5 F | HEIGHT: 73 IN | OXYGEN SATURATION: 97 % | RESPIRATION RATE: 17 BRPM

## 2024-08-26 DIAGNOSIS — Z96.652 STATUS POST TOTAL LEFT KNEE REPLACEMENT: ICD-10-CM

## 2024-08-26 DIAGNOSIS — I10 PRIMARY HYPERTENSION: ICD-10-CM

## 2024-08-26 DIAGNOSIS — E66.01 CLASS 2 SEVERE OBESITY WITH SERIOUS COMORBIDITY AND BODY MASS INDEX (BMI) OF 37.0 TO 37.9 IN ADULT, UNSPECIFIED OBESITY TYPE (HCC): Primary | ICD-10-CM

## 2024-08-26 DIAGNOSIS — E66.9 OBESITY (BMI 30-39.9): ICD-10-CM

## 2024-08-26 DIAGNOSIS — Z00.00 WELL ADULT EXAM: ICD-10-CM

## 2024-08-26 DIAGNOSIS — E88.89 STEATOSIS (HCC): ICD-10-CM

## 2024-08-26 PROBLEM — M25.562 CHRONIC PAIN OF BOTH KNEES: Status: RESOLVED | Noted: 2020-05-22 | Resolved: 2024-08-26

## 2024-08-26 PROBLEM — R10.9 ABDOMINAL PAIN: Status: RESOLVED | Noted: 2024-03-11 | Resolved: 2024-08-26

## 2024-08-26 PROBLEM — M25.561 CHRONIC PAIN OF BOTH KNEES: Status: RESOLVED | Noted: 2020-05-22 | Resolved: 2024-08-26

## 2024-08-26 PROBLEM — R07.9 CHEST PAIN: Status: RESOLVED | Noted: 2023-02-20 | Resolved: 2024-08-26

## 2024-08-26 PROBLEM — G89.29 CHRONIC PAIN OF BOTH KNEES: Status: RESOLVED | Noted: 2020-05-22 | Resolved: 2024-08-26

## 2024-08-26 PROBLEM — K92.1 HEMATOCHEZIA: Status: RESOLVED | Noted: 2022-06-03 | Resolved: 2024-08-26

## 2024-08-26 PROCEDURE — 99396 PREV VISIT EST AGE 40-64: CPT | Performed by: FAMILY MEDICINE

## 2024-08-26 PROCEDURE — 99213 OFFICE O/P EST LOW 20 MIN: CPT | Performed by: FAMILY MEDICINE

## 2024-08-26 NOTE — PROGRESS NOTES
FAMILY PRACTICE OFFICE VISIT       NAME: Bob Martínez  AGE: 53 y.o. SEX: male       : 1970        MRN: [id number]    DATE: 2024  TIME: 6:16 AM    Assessment and Plan     Problem List Items Addressed This Visit       Hypertension     Hypertension.  Patient with borderline blood pressure readings.  He was advised to try and exercise on a stationary bicycle once again and we will initiate Wegovy to try and lose weight to assist with blood pressure control         Relevant Medications    Semaglutide-Weight Management (WEGOVY) 0.25 MG/0.5ML    Other Relevant Orders    CBC    Comprehensive metabolic panel    Lipid panel    TSH, 3rd generation    PSA, Total Screen    Obesity (BMI 30-39.9)    Status post total left knee replacement    Relevant Medications    Semaglutide-Weight Management (WEGOVY) 0.25 MG/0.5ML    Other Relevant Orders    CBC    Comprehensive metabolic panel    Lipid panel    TSH, 3rd generation    PSA, Total Screen    Steatosis (HCC)    Class 2 severe obesity with serious comorbidity and body mass index (BMI) of 37.0 to 37.9 in adult (HCC) - Primary     Obesity.  I had a long discussion with the patient regarding the use of Wegovy.  New prescription was sent to pharmacy to use as directed.  Patient will call within 4 weeks if tolerating so that we may titrate medication accordingly.  Patient has a history of hypertension and steatosis with limited exercise potential.  I feel it is medically necessary for patient to be able to lose weight to improve these values         Relevant Medications    Semaglutide-Weight Management (WEGOVY) 0.25 MG/0.5ML    Other Relevant Orders    CBC    Comprehensive metabolic panel    Lipid panel    TSH, 3rd generation    PSA, Total Screen    Well adult exam     Well adult.  Overall the patient appears to be in stable health.  He will obtain blood work as ordered for further evaluation.  We will make further recommendations pending results of test.                 Chief Complaint     Chief Complaint   Patient presents with    Well Check     PHYSICAL       History of Present Illness     Patient in the office for annual wellness exam.  Patient denies any recent illness.  He has gained some weight due to inactivity is.  He has been over 1 year since his left total knee replacement surgery however he continues to experience some discomfort and swelling.  He is a non-smoker.  His colonoscopy is up-to-date until 2032.  Patient would like to consider semaglutide injections to lose weight since he is limited with his exercise ability and has a history of steatosis.        Review of Systems   Review of Systems   Constitutional:  Positive for unexpected weight change.   HENT: Negative.     Respiratory: Negative.     Cardiovascular: Negative.    Gastrointestinal: Negative.    Genitourinary: Negative.    Musculoskeletal:  Positive for arthralgias.   Neurological: Negative.    Psychiatric/Behavioral: Negative.         Active Problem List     Patient Active Problem List   Diagnosis    Hypertension    Esophageal reflux    Onychomycosis    Bursitis of heel, right    Lesion of mouth    Rupture of right quadriceps tendon    Obesity (BMI 30-39.9)    Essential hypertension    Status post total left knee replacement    COVID-19 virus infection    Tinea    Steatosis (HCC)    Class 2 severe obesity with serious comorbidity and body mass index (BMI) of 37.0 to 37.9 in adult (HCC)    Well adult exam       Past Medical History:  Past Medical History:   Diagnosis Date    Acute meniscal tear of left knee     Arthritis     Carpal tunnel syndrome, unspecified upper limb     Hypercholesterolemia     Hypertension     Morbid obesity (HCC)        Past Surgical History:  Past Surgical History:   Procedure Laterality Date    ARTHROSCOPY KNEE      QUADRICEPS REPAIR         Family History:  Family History   Problem Relation Age of Onset    Atrial fibrillation Mother         CARDIAC PACEMAKER     Parkinsonism Mother     Bleeding Disorder Father         ACTIVE INTERNAL BLEEDING    Liver disease Father         LIVER DAMAGE       Social History:  Social History     Socioeconomic History    Marital status: Single     Spouse name: Not on file    Number of children: Not on file    Years of education: Not on file    Highest education level: Not on file   Occupational History    Not on file   Tobacco Use    Smoking status: Never    Smokeless tobacco: Never   Vaping Use    Vaping status: Never Used   Substance and Sexual Activity    Alcohol use: No    Drug use: No    Sexual activity: Yes   Other Topics Concern    Not on file   Social History Narrative    Not on file     Social Determinants of Health     Financial Resource Strain: Low Risk  (6/1/2023)    [facility]    Overall Financial Resource Strain (CARDIA)     Difficulty of Paying Living Expenses: Not hard at all   Food Insecurity: No Food Insecurity (8/19/2024)    Hunger Vital Sign     Worried About Running Out of Food in the Last Year: Never true     Ran Out of Food in the Last Year: Never true   Transportation Needs: No Transportation Needs (8/19/2024)    PRAPARE - Transportation     Lack of Transportation (Medical): No     Lack of Transportation (Non-Medical): No   Physical Activity: Not on file   Stress: Not on file   Social Connections: Not on file   Intimate Partner Violence: Not At Risk (6/1/2023)    [facility]    Humiliation, Afraid, Rape, and Kick questionnaire     Fear of Current or Ex-Partner: No     Emotionally Abused: No     Physically Abused: No     Sexually Abused: No   Housing Stability: Unknown (8/19/2024)    Housing Stability Vital Sign     Unable to Pay for Housing in the Last Year: No     Number of Times Moved in the Last Year: Not on file     Homeless in the Last Year: No       Objective     Vitals:    08/26/24 1556    BP: 120/80   Pulse: 67   Resp: 17   Temp: 97.5 °F (36.4 °C)   SpO2: 97%     Wt Readings from Last 3 Encounters:   08/26/24 130 kg (285 lb 8 oz)   03/11/24 129 kg (283 lb 9.6 oz)   01/12/24 126 kg (278 lb 9.6 oz)       Physical Exam  Constitutional:       General: He is not in acute distress.     Appearance: Normal appearance. He is not ill-appearing.   HENT:      Head: Normocephalic and atraumatic.   Eyes:      General:         Right eye: No discharge.         Left eye: No discharge.      Extraocular Movements: Extraocular movements intact.      Conjunctiva/sclera: Conjunctivae normal.      Pupils: Pupils are equal, round, and reactive to light.   Neck:      Vascular: No carotid bruit.   Cardiovascular:      Rate and Rhythm: Normal rate and regular rhythm.      Heart sounds: Normal heart sounds. No murmur heard.  Pulmonary:      Effort: Pulmonary effort is normal.      Breath sounds: Normal breath sounds. No wheezing, rhonchi or rales.   Abdominal:      General: Abdomen is flat. Bowel sounds are normal. There is no distension.      Palpations: Abdomen is soft.      Tenderness: There is no abdominal tenderness. There is no guarding or rebound.   Musculoskeletal:      Right lower leg: No edema.      Left lower leg: No edema.   Lymphadenopathy:      Cervical: No cervical adenopathy.   Skin:     Findings: No rash.   Neurological:      General: No focal deficit present.      Mental Status: He is alert and oriented to person, place, and time.      Cranial Nerves: No cranial nerve deficit.   Psychiatric:         Mood and Affect: Mood normal.         Behavior: Behavior normal.         Thought Content: Thought content normal.         Judgment: Judgment normal.         Pertinent Laboratory/Diagnostic Studies:  Lab Results   Component Value Date    GLUCOSE 84 10/17/2013    BUN 13 06/02/2023    CREATININE 0.97 06/02/2023    CALCIUM 9.0 06/02/2023     02/18/2017    K 4.4 06/02/2023    CO2 21 06/02/2023      "06/02/2023     Lab Results   Component Value Date    ALT 20 05/06/2023    AST 21 05/06/2023    ALKPHOS 53 05/06/2023    BILITOT 0.6 02/18/2017       Lab Results   Component Value Date    WBC 5.0 08/06/2022    HGB 14.1 06/02/2023    HCT 43.3 06/02/2023    MCV 91.5 08/06/2022     08/06/2022       Lab Results   Component Value Date    TSH 2.29 08/06/2022       Lab Results   Component Value Date    CHOL 175 10/17/2013     Lab Results   Component Value Date    TRIG 72 02/24/2024     Lab Results   Component Value Date    HDL 41 02/24/2024     Lab Results   Component Value Date    LDLCALC 120 (H) 02/24/2024     No results found for: \"HGBA1C\"    Results for orders placed or performed in visit on 02/24/24   Lipid panel   Result Value Ref Range    Total Cholesterol 177 <200 mg/dL    HDL 41 > OR = 40 mg/dL    Triglycerides 72 <150 mg/dL    LDL Calculated 120 (H) mg/dL (calc)    Chol HDLC Ratio 4.3 <5.0 (calc)    Non-HDL Cholesterol 136 (H) <130 mg/dL (calc)   PSA, Total Screen   Result Value Ref Range    Prostate Specific Antigen Total 0.82 < OR = 4.00 ng/mL       Orders Placed This Encounter   Procedures    CBC    Comprehensive metabolic panel    Lipid panel    TSH, 3rd generation    PSA, Total Screen       ALLERGIES:  No Known Allergies    Current Medications     Current Outpatient Medications   Medication Sig Dispense Refill    Semaglutide-Weight Management (WEGOVY) 0.25 MG/0.5ML Inject 0.5 mL (0.25 mg total) under the skin once a week 2 mL 0    valsartan-hydrochlorothiazide (DIOVAN-HCT) 80-12.5 MG per tablet TAKE 1 TABLET BY MOUTH EVERY DAY 90 tablet 3     No current facility-administered medications for this visit.         Health Maintenance     Health Maintenance   Topic Date Due    Hepatitis C Screening  Never done    HIV Screening  Never done    DTaP,Tdap,and Td Vaccines (1 - Tdap) Never done    COVID-19 Vaccine (6 - 2023-24 season) 09/01/2023    Annual Physical  08/24/2024    Influenza Vaccine (1) 09/01/2024    " Depression Screening  08/26/2025    RSV Vaccine Age 60+ Years (1 - 1-dose 60+ series) 12/11/2028    Colorectal Cancer Screening  11/04/2032    Zoster Vaccine  Completed    RSV Vaccine age 0-20 Months  Aged Out    Pneumococcal Vaccine: Pediatrics (0 to 5 Years) and At-Risk Patients (6 to 64 Years)  Aged Out    HIB Vaccine  Aged Out    IPV Vaccine  Aged Out    Hepatitis A Vaccine  Aged Out    Meningococcal ACWY Vaccine  Aged Out    HPV Vaccine  Aged Out     Immunization History   Administered Date(s) Administered    COVID-19 MODERNA VACC 0.5 ML IM 01/21/2021, 02/19/2021, 11/11/2021, 05/27/2022    COVID-19 Moderna Vac BIVALENT 12 Yr+ IM 0.5 ML 11/14/2022    INFLUENZA 10/23/2013, 10/01/2018    Influenza, injectable, quadrivalent, preservative free 0.5 mL 09/19/2020    Influenza, recombinant, quadrivalent,injectable, preservative free 11/05/2019, 10/09/2021    Influenza, seasonal, injectable 10/04/2016    Influenza, seasonal, injectable, preservative free 10/01/2018    Zoster Vaccine Recombinant 04/26/2022, 08/18/2022       Ward Muñiz MD

## 2024-08-26 NOTE — PATIENT INSTRUCTIONS
Medicare Preventive Visit Patient Instructions  Thank you for completing your Welcome to Medicare Visit or Medicare Annual Wellness Visit today. Your next wellness visit will be due in one year (8/27/2025).  The screening/preventive services that you may require over the next 5-10 years are detailed below. Some tests may not apply to you based off risk factors and/or age. Screening tests ordered at today's visit but not completed yet may show as past due. Also, please note that scanned in results may not display below.  Preventive Screenings:  Service Recommendations Previous Testing/Comments   Colorectal Cancer Screening  Colonoscopy    Fecal Occult Blood Test (FOBT)/Fecal Immunochemical Test (FIT)  Fecal DNA/Cologuard Test  Flexible Sigmoidoscopy Age: 45-75 years old   Colonoscopy: every 10 years (May be performed more frequently if at higher risk)  OR  FOBT/FIT: every 1 year  OR  Cologuard: every 3 years  OR  Sigmoidoscopy: every 5 years  Screening may be recommended earlier than age 45 if at higher risk for colorectal cancer. Also, an individualized decision between you and your healthcare provider will decide whether screening between the ages of 76-85 would be appropriate. Colonoscopy: 11/07/2022  FOBT/FIT: Not on file  Cologuard: Not on file  Sigmoidoscopy: Not on file    Screening Current     Prostate Cancer Screening Individualized decision between patient and health care provider in men between ages of 55-69   Medicare will cover every 12 months beginning on the day after your 50th birthday PSA: 0.82 ng/mL     Screening Current     Hepatitis C Screening Once for adults born between 1945 and 1965  More frequently in patients at high risk for Hepatitis C Hep C Antibody: Not on file        Diabetes Screening 1-2 times per year if you're at risk for diabetes or have pre-diabetes Fasting glucose: No results in last 5 years (No results in last 5 years)  A1C: No results in last 5 years (No results in last 5  years)      Cholesterol Screening Once every 5 years if you don't have a lipid disorder. May order more often based on risk factors. Lipid panel: 02/24/2024  Screening Current      Other Preventive Screenings Covered by Medicare:  Abdominal Aortic Aneurysm (AAA) Screening: covered once if your at risk. You're considered to be at risk if you have a family history of AAA or a male between the age of 65-75 who smoking at least 100 cigarettes in your lifetime.  Lung Cancer Screening: covers low dose CT scan once per year if you meet all of the following conditions: (1) Age 55-77; (2) No signs or symptoms of lung cancer; (3) Current smoker or have quit smoking within the last 15 years; (4) You have a tobacco smoking history of at least 20 pack years (packs per day x number of years you smoked); (5) You get a written order from a healthcare provider.  Glaucoma Screening: covered annually if you're considered high risk: (1) You have diabetes OR (2) Family history of glaucoma OR (3)  aged 50 and older OR (4)  American aged 65 and older  Osteoporosis Screening: covered every 2 years if you meet one of the following conditions: (1) Have a vertebral abnormality; (2) On glucocorticoid therapy for more than 3 months; (3) Have primary hyperparathyroidism; (4) On osteoporosis medications and need to assess response to drug therapy.  HIV Screening: covered annually if you're between the age of 15-65. Also covered annually if you are younger than 15 and older than 65 with risk factors for HIV infection. For pregnant patients, it is covered up to 3 times per pregnancy.    Immunizations:  Immunization Recommendations   Influenza Vaccine Annual influenza vaccination during flu season is recommended for all persons aged >= 6 months who do not have contraindications   Pneumococcal Vaccine   * Pneumococcal conjugate vaccine = PCV13 (Prevnar 13), PCV15 (Vaxneuvance), PCV20 (Prevnar 20)  * Pneumococcal polysaccharide  vaccine = PPSV23 (Pneumovax) Adults 19-65 yo with certain risk factors or if 65+ yo  If never received any pneumonia vaccine: recommend Prevnar 20 (PCV20)  Give PCV20 if previously received 1 dose of PCV13 or PPSV23   Hepatitis B Vaccine 3 dose series if at intermediate or high risk (ex: diabetes, end stage renal disease, liver disease)   Respiratory syncytial virus (RSV) Vaccine - COVERED BY MEDICARE PART D  * RSVPreF3 (Arexvy) CDC recommends that adults 60 years of age and older may receive a single dose of RSV vaccine using shared clinical decision-making (SCDM)   Tetanus (Td) Vaccine - COST NOT COVERED BY MEDICARE PART B Following completion of primary series, a booster dose should be given every 10 years to maintain immunity against tetanus. Td may also be given as tetanus wound prophylaxis.   Tdap Vaccine - COST NOT COVERED BY MEDICARE PART B Recommended at least once for all adults. For pregnant patients, recommended with each pregnancy.   Shingles Vaccine (Shingrix) - COST NOT COVERED BY MEDICARE PART B  2 shot series recommended in those 19 years and older who have or will have weakened immune systems or those 50 years and older     Health Maintenance Due:      Topic Date Due   • Hepatitis C Screening  Never done   • HIV Screening  Never done   • Colorectal Cancer Screening  11/04/2032     Immunizations Due:      Topic Date Due   • DTaP,Tdap,and Td Vaccines (1 - Tdap) Never done   • COVID-19 Vaccine (6 - 2023-24 season) 09/01/2023   • Influenza Vaccine (1) 09/01/2024     Advance Directives   What are advance directives?  Advance directives are legal documents that state your wishes and plans for medical care. These plans are made ahead of time in case you lose your ability to make decisions for yourself. Advance directives can apply to any medical decision, such as the treatments you want, and if you want to donate organs.   What are the types of advance directives?  There are many types of advance  directives, and each state has rules about how to use them. You may choose a combination of any of the following:  Living will:  This is a written record of the treatment you want. You can also choose which treatments you do not want, which to limit, and which to stop at a certain time. This includes surgery, medicine, IV fluid, and tube feedings.   Durable power of  for healthcare (DPAHC):  This is a written record that states who you want to make healthcare choices for you when you are unable to make them for yourself. This person, called a proxy, is usually a family member or a friend. You may choose more than 1 proxy.  Do not resuscitate (DNR) order:  A DNR order is used in case your heart stops beating or you stop breathing. It is a request not to have certain forms of treatment, such as CPR. A DNR order may be included in other types of advance directives.  Medical directive:  This covers the care that you want if you are in a coma, near death, or unable to make decisions for yourself. You can list the treatments you want for each condition. Treatment may include pain medicine, surgery, blood transfusions, dialysis, IV or tube feedings, and a ventilator (breathing machine).  Values history:  This document has questions about your views, beliefs, and how you feel and think about life. This information can help others choose the care that you would choose.  Why are advance directives important?  An advance directive helps you control your care. Although spoken wishes may be used, it is better to have your wishes written down. Spoken wishes can be misunderstood, or not followed. Treatments may be given even if you do not want them. An advance directive may make it easier for your family to make difficult choices about your care.   Weight Management   Why it is important to manage your weight:  Being overweight increases your risk of health conditions such as heart disease, high blood pressure, type 2  diabetes, and certain types of cancer. It can also increase your risk for osteoarthritis, sleep apnea, and other respiratory problems. Aim for a slow, steady weight loss. Even a small amount of weight loss can lower your risk of health problems.  How to lose weight safely:  A safe and healthy way to lose weight is to eat fewer calories and get regular exercise. You can lose up about 1 pound a week by decreasing the number of calories you eat by 500 calories each day.   Healthy meal plan for weight management:  A healthy meal plan includes a variety of foods, contains fewer calories, and helps you stay healthy. A healthy meal plan includes the following:  Eat whole-grain foods more often.  A healthy meal plan should contain fiber. Fiber is the part of grains, fruits, and vegetables that is not broken down by your body. Whole-grain foods are healthy and provide extra fiber in your diet. Some examples of whole-grain foods are whole-wheat breads and pastas, oatmeal, brown rice, and bulgur.  Eat a variety of vegetables every day.  Include dark, leafy greens such as spinach, kale, ramon greens, and mustard greens. Eat yellow and orange vegetables such as carrots, sweet potatoes, and winter squash.   Eat a variety of fruits every day.  Choose fresh or canned fruit (canned in its own juice or light syrup) instead of juice. Fruit juice has very little or no fiber.  Eat low-fat dairy foods.  Drink fat-free (skim) milk or 1% milk. Eat fat-free yogurt and low-fat cottage cheese. Try low-fat cheeses such as mozzarella and other reduced-fat cheeses.  Choose meat and other protein foods that are low in fat.  Choose beans or other legumes such as split peas or lentils. Choose fish, skinless poultry (chicken or turkey), or lean cuts of red meat (beef or pork). Before you cook meat or poultry, cut off any visible fat.   Use less fat and oil.  Try baking foods instead of frying them. Add less fat, such as margarine, sour cream,  regular salad dressing and mayonnaise to foods. Eat fewer high-fat foods. Some examples of high-fat foods include french fries, doughnuts, ice cream, and cakes.  Eat fewer sweets.  Limit foods and drinks that are high in sugar. This includes candy, cookies, regular soda, and sweetened drinks.  Exercise:  Exercise at least 30 minutes per day on most days of the week. Some examples of exercise include walking, biking, dancing, and swimming. You can also fit in more physical activity by taking the stairs instead of the elevator or parking farther away from stores. Ask your healthcare provider about the best exercise plan for you.      © Copyright [employer] 2018 Information is for End User's use only and may not be sold, redistributed or otherwise used for commercial purposes. All illustrations and images included in CareNotes® are the copyrighted property of A.[employer].A.M., Inc. or [employer]

## 2024-08-26 NOTE — PROGRESS NOTES
Ambulatory Visit  Name: Bob Martínez      : 1970      MRN: [id number]  Encounter Provider: Ward Muñiz MD  Encounter Date: 2024   Encounter department: [facility]    Assessment & Plan   {There are no diagnoses linked to this encounter. (Refresh or delete this SmartLink)}     Preventive health issues were discussed with patient, and age appropriate screening tests were ordered as noted in patient's After Visit Summary. Personalized health advice and appropriate referrals for health education or preventive services given if needed, as noted in patient's After Visit Summary.    History of Present Illness   {Disappearing Hyperlinks I Encounters * My Last Note * Since Last Visit * History :87130}  HPI   Patient Care Team:  Ward Muñiz MD as PCP - General  Ward Muñiz MD    Review of Systems  Medical History Reviewed by provider this encounter:       Annual Wellness Visit Questionnaire       Health Risk Assessment:   Patient rates overall health as good. Patient feels that their physical health rating is same. Patient is satisfied with their life. Eyesight was rated as same. Hearing was rated as same. Patient feels that their emotional and mental health rating is same. Patients states they are never, rarely angry. Patient states they are sometimes unusually tired/fatigued. Pain experienced in the last 7 days has been none. Patient states that he has experienced no weight loss or gain in last 6 months.     Fall Risk Screening:   In the past year, patient has experienced: no history of falling in past year      Home Safety:  Patient does not have trouble with stairs inside or outside of their home. Patient has working smoke alarms and has no working carbon monoxide detector. Home safety hazards include: none.     Nutrition:   Current diet is Regular.     Medications:   Patient is not currently taking any over-the-counter supplements. Patient is able to manage medications.      Activities of Daily Living (ADLs)/Instrumental Activities of Daily Living (IADLs):   Walk and transfer into and out of bed and chair?: Yes  Dress and groom yourself?: Yes    Bathe or shower yourself?: Yes    Feed yourself? Yes  Do your laundry/housekeeping?: Yes  Manage your money, pay your bills and track your expenses?: Yes  Make your own meals?: Yes    Do your own shopping?: Yes    Durable Medical Equipment Suppliers  none    Previous Hospitalizations:   Any hospitalizations or ED visits within the last 12 months?: No      Advance Care Planning:   Living will: No    Durable POA for healthcare: No    Advanced directive: No      PREVENTIVE SCREENINGS      Cardiovascular Screening:    General: Screening Current      Colorectal Cancer Screening:     General: Screening Current      Prostate Cancer Screening:    General: Screening Current      Lung Cancer Screening:     General: Screening Not Indicated    Screening, Brief Intervention, and Referral to Treatment (SBIRT)    Screening  Typical number of drinks in a day: 0  Typical number of drinks in a week: 0  Interpretation: Low risk drinking behavior.    AUDIT-C Screenin) How often did you have a drink containing alcohol in the past year? never  2) How many drinks did you have on a typical day when you were drinking in the past year? 0  3) How often did you have 6 or more drinks on one occasion in the past year? never    AUDIT-C Score: 0  Interpretation: Score 0-3 (male): Negative screen for alcohol misuse    Single Item Drug Screening:  How often have you used an illegal drug (including marijuana) or a prescription medication for non-medical reasons in the past year? never    Single Item Drug Screen Score: 0  Interpretation: Negative screen for possible drug use disorder    Social Determinants of Health     Financial Resource Strain: Low Risk  (2023)    [facility]    Overall Financial Resource  Strain (CARDIA)     Difficulty of Paying Living Expenses: Not hard at all   Food Insecurity: No Food Insecurity (8/19/2024)    Hunger Vital Sign     Worried About Running Out of Food in the Last Year: Never true     Ran Out of Food in the Last Year: Never true   Transportation Needs: No Transportation Needs (8/19/2024)    PRAPARE - Transportation     Lack of Transportation (Medical): No     Lack of Transportation (Non-Medical): No   Housing Stability: Unknown (8/19/2024)    Housing Stability Vital Sign     Unable to Pay for Housing in the Last Year: No     Homeless in the Last Year: No   Utilities: Not At Risk (8/19/2024)    [facility] Utilities     Threatened with loss of utilities: No     No results found.    Objective   {Disappearing Hyperlinks   Review Vitals * Enter New Vitals * Results Review * Labs * Imaging * Cardiology * Procedures * Lung Cancer Screening :94501}  There were no vitals taken for this visit.    Physical Exam  {Administrative / Billing Section (Optional):07376}

## 2024-08-27 ENCOUNTER — TELEPHONE (OUTPATIENT)
Age: 54
End: 2024-08-27

## 2024-08-27 PROBLEM — E66.812 CLASS 2 SEVERE OBESITY WITH SERIOUS COMORBIDITY AND BODY MASS INDEX (BMI) OF 37.0 TO 37.9 IN ADULT (HCC): Status: ACTIVE | Noted: 2024-08-27

## 2024-08-27 PROBLEM — E66.01 CLASS 2 SEVERE OBESITY WITH SERIOUS COMORBIDITY AND BODY MASS INDEX (BMI) OF 37.0 TO 37.9 IN ADULT (HCC): Status: ACTIVE | Noted: 2024-08-27

## 2024-08-27 PROBLEM — Z00.00 WELL ADULT EXAM: Status: ACTIVE | Noted: 2024-08-27

## 2024-08-27 NOTE — ASSESSMENT & PLAN NOTE
Hypertension.  Patient with borderline blood pressure readings.  He was advised to try and exercise on a stationary bicycle once again and we will initiate Wegovy to try and lose weight to assist with blood pressure control

## 2024-08-27 NOTE — TELEPHONE ENCOUNTER
PA for WEGOVY 0.25 MG/0.5ML SUBMITTED     via    []CMM-KEY:   [x]Adan-Case ID # [id number]   []Faxed to plan   []Other website   []Phone call Case ID #     Office notes sent, clinical questions answered. Awaiting determination    Turnaround time for your insurance to make a decision on your Prior Authorization can take 7-21 business days.

## 2024-08-27 NOTE — TELEPHONE ENCOUNTER
PA for Wegovy APPROVED     Date(s) approved 8/27/24-3/25/25    Case #[id number]    Patient advised by          [x]Buzzoolehart Message  []Phone call   []LMOM  []L/M to call office as no active Communication consent on file  []Unable to leave detailed message as VM not approved on Communication consent       Pharmacy advised by    [x]Fax  []Phone call    Approval letter scanned into Media Yes

## 2024-08-27 NOTE — ASSESSMENT & PLAN NOTE
Obesity.  I had a long discussion with the patient regarding the use of Wegovy.  New prescription was sent to pharmacy to use as directed.  Patient will call within 4 weeks if tolerating so that we may titrate medication accordingly.  Patient has a history of hypertension and steatosis with limited exercise potential.  I feel it is medically necessary for patient to be able to lose weight to improve these values

## 2024-09-22 DIAGNOSIS — I10 PRIMARY HYPERTENSION: ICD-10-CM

## 2024-09-22 DIAGNOSIS — E66.01 CLASS 2 SEVERE OBESITY WITH SERIOUS COMORBIDITY AND BODY MASS INDEX (BMI) OF 37.0 TO 37.9 IN ADULT, UNSPECIFIED OBESITY TYPE (HCC): ICD-10-CM

## 2024-09-22 DIAGNOSIS — Z96.652 STATUS POST TOTAL LEFT KNEE REPLACEMENT: ICD-10-CM

## 2024-09-22 DIAGNOSIS — E66.812 CLASS 2 SEVERE OBESITY WITH SERIOUS COMORBIDITY AND BODY MASS INDEX (BMI) OF 37.0 TO 37.9 IN ADULT, UNSPECIFIED OBESITY TYPE (HCC): ICD-10-CM

## 2024-09-23 DIAGNOSIS — E66.812 CLASS 2 SEVERE OBESITY WITH SERIOUS COMORBIDITY AND BODY MASS INDEX (BMI) OF 37.0 TO 37.9 IN ADULT, UNSPECIFIED OBESITY TYPE (HCC): Primary | ICD-10-CM

## 2024-09-23 DIAGNOSIS — E66.01 CLASS 2 SEVERE OBESITY WITH SERIOUS COMORBIDITY AND BODY MASS INDEX (BMI) OF 37.0 TO 37.9 IN ADULT, UNSPECIFIED OBESITY TYPE (HCC): Primary | ICD-10-CM

## 2024-09-26 DIAGNOSIS — E66.812 CLASS 2 SEVERE OBESITY WITH SERIOUS COMORBIDITY AND BODY MASS INDEX (BMI) OF 37.0 TO 37.9 IN ADULT, UNSPECIFIED OBESITY TYPE (HCC): ICD-10-CM

## 2024-09-26 DIAGNOSIS — E66.01 CLASS 2 SEVERE OBESITY WITH SERIOUS COMORBIDITY AND BODY MASS INDEX (BMI) OF 37.0 TO 37.9 IN ADULT, UNSPECIFIED OBESITY TYPE (HCC): ICD-10-CM

## 2024-09-26 PROBLEM — Z00.00 WELL ADULT EXAM: Status: RESOLVED | Noted: 2024-08-27 | Resolved: 2024-09-26

## 2024-10-22 DIAGNOSIS — E66.812 CLASS 2 SEVERE OBESITY WITH SERIOUS COMORBIDITY AND BODY MASS INDEX (BMI) OF 37.0 TO 37.9 IN ADULT, UNSPECIFIED OBESITY TYPE (HCC): Primary | ICD-10-CM

## 2024-10-22 DIAGNOSIS — E66.01 CLASS 2 SEVERE OBESITY WITH SERIOUS COMORBIDITY AND BODY MASS INDEX (BMI) OF 37.0 TO 37.9 IN ADULT, UNSPECIFIED OBESITY TYPE (HCC): Primary | ICD-10-CM

## 2024-10-22 DIAGNOSIS — E66.812 CLASS 2 SEVERE OBESITY WITH SERIOUS COMORBIDITY AND BODY MASS INDEX (BMI) OF 37.0 TO 37.9 IN ADULT, UNSPECIFIED OBESITY TYPE (HCC): ICD-10-CM

## 2024-10-22 DIAGNOSIS — E66.01 CLASS 2 SEVERE OBESITY WITH SERIOUS COMORBIDITY AND BODY MASS INDEX (BMI) OF 37.0 TO 37.9 IN ADULT, UNSPECIFIED OBESITY TYPE (HCC): ICD-10-CM

## 2024-10-22 RX ORDER — SEMAGLUTIDE 1 MG/.5ML
INJECTION, SOLUTION SUBCUTANEOUS
Qty: 2 ML | Refills: 0 | Status: SHIPPED | OUTPATIENT
Start: 2024-10-22 | End: 2024-10-23 | Stop reason: SDUPTHER

## 2024-10-23 DIAGNOSIS — E66.01 CLASS 2 SEVERE OBESITY WITH SERIOUS COMORBIDITY AND BODY MASS INDEX (BMI) OF 37.0 TO 37.9 IN ADULT, UNSPECIFIED OBESITY TYPE (HCC): ICD-10-CM

## 2024-10-23 DIAGNOSIS — E66.812 CLASS 2 SEVERE OBESITY WITH SERIOUS COMORBIDITY AND BODY MASS INDEX (BMI) OF 37.0 TO 37.9 IN ADULT, UNSPECIFIED OBESITY TYPE (HCC): ICD-10-CM

## 2024-10-23 RX ORDER — SEMAGLUTIDE 1 MG/.5ML
INJECTION, SOLUTION SUBCUTANEOUS
Qty: 2 ML | Refills: 0 | Status: SHIPPED | OUTPATIENT
Start: 2024-10-23

## 2024-10-23 RX ORDER — SEMAGLUTIDE 1 MG/.5ML
INJECTION, SOLUTION SUBCUTANEOUS
Qty: 2 ML | Refills: 0 | Status: CANCELLED | OUTPATIENT
Start: 2024-10-23

## 2024-10-23 NOTE — TELEPHONE ENCOUNTER
Not a duplicate    Patient needs Wegovy 1mg resent to:  [id number] - [place] - [address]     [facility] does not have med in stock

## 2024-10-23 NOTE — TELEPHONE ENCOUNTER
Patient called the RX Refill Line. Message is being forwarded to the office.     Patient called back, stated that [facility] do not have wegovy in stock and he called other pharmacies and they also do not have it in stock, he would like to know what he should do.     Patient requested a call back at [phone] to discuss plan going forward.     Wegovy removed from encounter, [facility] do not have in stock. Please contact patient to discuss

## 2024-10-23 NOTE — TELEPHONE ENCOUNTER
Patient called regarding Rx: Wegovy. Pt states has checked at different pharmacies for 1 mg and is not available. Pt states [facility] pharmacy has 1.7 mg or 2.4 mg. Inquiring if able to increase dose of medication.      Pharm[address], [place]       Please review and advise.  Thank you

## 2024-10-23 NOTE — TELEPHONE ENCOUNTER
Patient called requesting refill for Wegovy 1 mg. Patient made aware medication was refilled on 10/22/24 for 2 ml with 0 refills to [facility] pharmacy. Patient instructed to contact the pharmacy to obtain refills of medication. Patient verbalized understanding.

## 2024-11-20 ENCOUNTER — TELEPHONE (OUTPATIENT)
Dept: FAMILY MEDICINE CLINIC | Facility: CLINIC | Age: 54
End: 2024-11-20

## 2024-11-20 DIAGNOSIS — E66.01 CLASS 2 SEVERE OBESITY WITH SERIOUS COMORBIDITY AND BODY MASS INDEX (BMI) OF 37.0 TO 37.9 IN ADULT, UNSPECIFIED OBESITY TYPE (HCC): Primary | ICD-10-CM

## 2024-11-20 DIAGNOSIS — E66.812 CLASS 2 SEVERE OBESITY WITH SERIOUS COMORBIDITY AND BODY MASS INDEX (BMI) OF 37.0 TO 37.9 IN ADULT, UNSPECIFIED OBESITY TYPE (HCC): Primary | ICD-10-CM

## 2024-11-20 NOTE — TELEPHONE ENCOUNTER
Patient called the RX Refill Line. Message is being forwarded to the office.     Patient is requesting an increased dose for his   Semaglutide-Weight Management (Wegovy)       Please contact patient at  with any further questions      How are you tolerating the medication?   [] Nausea  [] Vomiting  [] Diarrhea  [x] Asymptomatic  [] Other:    Last visit weight:Wt 130 kg (285 lb 8 oz)    Current weight: N/A    Date of last injection: 11-20-24    How many injections do you have left: 0    Would you like an increase in your dose?  [x] Yes   [] No       Preferred pharmacy: [id number]

## 2024-12-18 ENCOUNTER — TELEPHONE (OUTPATIENT)
Dept: FAMILY MEDICINE CLINIC | Facility: CLINIC | Age: 54
End: 2024-12-18

## 2024-12-18 DIAGNOSIS — E66.01 CLASS 2 SEVERE OBESITY WITH SERIOUS COMORBIDITY AND BODY MASS INDEX (BMI) OF 37.0 TO 37.9 IN ADULT, UNSPECIFIED OBESITY TYPE (HCC): Primary | ICD-10-CM

## 2024-12-18 DIAGNOSIS — E66.812 CLASS 2 SEVERE OBESITY WITH SERIOUS COMORBIDITY AND BODY MASS INDEX (BMI) OF 37.0 TO 37.9 IN ADULT, UNSPECIFIED OBESITY TYPE (HCC): Primary | ICD-10-CM

## 2024-12-18 RX ORDER — SEMAGLUTIDE 2.4 MG/.75ML
INJECTION, SOLUTION SUBCUTANEOUS
Qty: 9 ML | Refills: 1 | Status: SHIPPED | OUTPATIENT
Start: 2024-12-18

## 2024-12-18 NOTE — TELEPHONE ENCOUNTER
Patient called the RX Refill Line. Message is being forwarded to the office.     Patient is requesting wegovy 2.4 mg not on medication list. Patient would like a 3 month supply sent to [id number]    Please contact patient at [phone]

## 2024-12-18 NOTE — TELEPHONE ENCOUNTER
"Called and spoke with patient and relay providers message. Patient states \" Thank you so much \" and verbalized understanding  "

## 2024-12-23 ENCOUNTER — TELEPHONE (OUTPATIENT)
Age: 54
End: 2024-12-23

## 2024-12-23 DIAGNOSIS — M10.00 IDIOPATHIC GOUT, UNSPECIFIED CHRONICITY, UNSPECIFIED SITE: Primary | ICD-10-CM

## 2024-12-23 RX ORDER — PREDNISONE 20 MG/1
TABLET ORAL
Qty: 10 TABLET | Refills: 0 | Status: SHIPPED | OUTPATIENT
Start: 2024-12-23

## 2024-12-23 NOTE — TELEPHONE ENCOUNTER
Patient called states he is having Gout flare up  on toe. Inquiring if provider can send prescription to pharmacy to treat. No available appts.      Please review and advise.  Thank you

## 2024-12-23 NOTE — TELEPHONE ENCOUNTER
Called patient and leave a detailed message for patient to go to pharmacy and  medication for Gout.

## 2025-02-02 NOTE — ASSESSMENT & PLAN NOTE
Hypertension.   The patient's blood pressure is stable at this time and he will continue current regimen of medications
Left knee replacement surgery. Patient continues to recover from recent knee replacement surgery. He will continue to perform his home exercises as per his physical therapy department.   He has follow-up with orthopedist in September 2023
Well adult. Overall the patient appears to be in stable health. He will obtain blood work as ordered for further evaluation. We will make further recommendations pending results of test.  His colonoscopy is up-to-date.
15

## 2025-02-23 LAB
ALBUMIN SERPL-MCNC: 4.4 G/DL (ref 3.6–5.1)
ALBUMIN/GLOB SERPL: 1.8 (CALC) (ref 1–2.5)
ALP SERPL-CCNC: 67 U/L (ref 35–144)
ALT SERPL-CCNC: 14 U/L (ref 9–46)
AST SERPL-CCNC: 16 U/L (ref 10–35)
BASOPHILS # BLD AUTO: 40 CELLS/UL (ref 0–200)
BASOPHILS NFR BLD AUTO: 0.7 %
BILIRUB SERPL-MCNC: 0.7 MG/DL (ref 0.2–1.2)
BUN SERPL-MCNC: 17 MG/DL (ref 7–25)
BUN/CREAT SERPL: NORMAL (CALC) (ref 6–22)
CALCIUM SERPL-MCNC: 9.6 MG/DL (ref 8.6–10.3)
CHLORIDE SERPL-SCNC: 103 MMOL/L (ref 98–110)
CHOLEST SERPL-MCNC: 160 MG/DL
CHOLEST/HDLC SERPL: 3.5 (CALC)
CO2 SERPL-SCNC: 29 MMOL/L (ref 20–32)
CREAT SERPL-MCNC: 0.93 MG/DL (ref 0.7–1.3)
EOSINOPHIL # BLD AUTO: 143 CELLS/UL (ref 15–500)
EOSINOPHIL NFR BLD AUTO: 2.5 %
ERYTHROCYTE [DISTWIDTH] IN BLOOD BY AUTOMATED COUNT: 12.4 % (ref 11–15)
GFR/BSA.PRED SERPLBLD CYS-BASED-ARV: 96 ML/MIN/1.73M2
GLOBULIN SER CALC-MCNC: 2.5 G/DL (CALC) (ref 1.9–3.7)
GLUCOSE SERPL-MCNC: 85 MG/DL (ref 65–99)
HCT VFR BLD AUTO: 45 % (ref 38.5–50)
HDLC SERPL-MCNC: 46 MG/DL
HGB BLD-MCNC: 14.9 G/DL (ref 13.2–17.1)
LDLC SERPL CALC-MCNC: 100 MG/DL (CALC)
LYMPHOCYTES # BLD AUTO: 1590 CELLS/UL (ref 850–3900)
LYMPHOCYTES NFR BLD AUTO: 27.9 %
MCH RBC QN AUTO: 30 PG (ref 27–33)
MCHC RBC AUTO-ENTMCNC: 33.1 G/DL (ref 32–36)
MCV RBC AUTO: 90.5 FL (ref 80–100)
MONOCYTES # BLD AUTO: 559 CELLS/UL (ref 200–950)
MONOCYTES NFR BLD AUTO: 9.8 %
NEUTROPHILS # BLD AUTO: 3369 CELLS/UL (ref 1500–7800)
NEUTROPHILS NFR BLD AUTO: 59.1 %
NONHDLC SERPL-MCNC: 114 MG/DL (CALC)
PLATELET # BLD AUTO: 302 THOUSAND/UL (ref 140–400)
PMV BLD REES-ECKER: 10.6 FL (ref 7.5–12.5)
POTASSIUM SERPL-SCNC: 4.2 MMOL/L (ref 3.5–5.3)
PROT SERPL-MCNC: 6.9 G/DL (ref 6.1–8.1)
PSA SERPL-MCNC: 0.98 NG/ML
RBC # BLD AUTO: 4.97 MILLION/UL (ref 4.2–5.8)
SODIUM SERPL-SCNC: 141 MMOL/L (ref 135–146)
TRIGL SERPL-MCNC: 57 MG/DL
TSH SERPL-ACNC: 2.36 MIU/L (ref 0.4–4.5)
WBC # BLD AUTO: 5.7 THOUSAND/UL (ref 3.8–10.8)

## 2025-02-26 ENCOUNTER — OFFICE VISIT (OUTPATIENT)
Dept: FAMILY MEDICINE CLINIC | Facility: CLINIC | Age: 55
End: 2025-02-26
Payer: COMMERCIAL

## 2025-02-26 VITALS
BODY MASS INDEX: 30.48 KG/M2 | WEIGHT: 230 LBS | RESPIRATION RATE: 16 BRPM | DIASTOLIC BLOOD PRESSURE: 68 MMHG | OXYGEN SATURATION: 98 % | TEMPERATURE: 97.8 F | HEART RATE: 65 BPM | HEIGHT: 73 IN | SYSTOLIC BLOOD PRESSURE: 122 MMHG

## 2025-02-26 DIAGNOSIS — I10 ESSENTIAL HYPERTENSION: Primary | ICD-10-CM

## 2025-02-26 DIAGNOSIS — E66.01 CLASS 2 SEVERE OBESITY DUE TO EXCESS CALORIES WITH SERIOUS COMORBIDITY AND BODY MASS INDEX (BMI) OF 37.0 TO 37.9 IN ADULT (HCC): ICD-10-CM

## 2025-02-26 DIAGNOSIS — E66.812 CLASS 2 SEVERE OBESITY DUE TO EXCESS CALORIES WITH SERIOUS COMORBIDITY AND BODY MASS INDEX (BMI) OF 37.0 TO 37.9 IN ADULT (HCC): ICD-10-CM

## 2025-02-26 DIAGNOSIS — H61.21 IMPACTED CERUMEN OF RIGHT EAR: ICD-10-CM

## 2025-02-26 PROCEDURE — 99213 OFFICE O/P EST LOW 20 MIN: CPT | Performed by: FAMILY MEDICINE

## 2025-02-26 NOTE — PROGRESS NOTES
FAMILY PRACTICE OFFICE VISIT       NAME: Bob Martínez  AGE: 54 y.o. SEX: male       : 1970        MRN: [id number]    DATE: 2025  TIME: 9:28 AM    Assessment and Plan     Problem List Items Addressed This Visit       Essential hypertension - Primary    Hypertension.  The patient's blood pressure is stable at this time and he will continue current regimen of medications         Class 2 severe obesity with serious comorbidity and body mass index (BMI) of 37.0 to 37.9 in adult (HCC)    Obesity.  Patient has had very good success at losing weight up to 55 pounds.  He would like to continue with current dose of Wegovy.  He was recommended to incorporate a walking regimen or using his stationary exercise bicycle for more consistent form of exercise         Impacted cerumen of right ear    Cerumen impaction.  Patient was given recommendation to use over-the-counter Debrox drops to use 5 drops twice daily for 7 days.  He will call if symptoms persist after 1 week where we  will reevaluate                Chief Complaint     Chief Complaint   Patient presents with    Follow-up     6 month       History of Present Illness     Patient in the office for review of chronic medical condition.  Patient denies any recent illness.  He has been able to lose 55 pounds with the use of Wegovy.  Patient states he has not been exercising on a regular basis.  He does not see any other specialist at this time        Review of Systems   Review of Systems   Constitutional: Negative.    HENT: Negative.     Eyes: Negative.    Respiratory: Negative.     Cardiovascular: Negative.    Gastrointestinal: Negative.    Genitourinary: Negative.    Musculoskeletal: Negative.    Skin: Negative.    Neurological: Negative.    Psychiatric/Behavioral: Negative.         Active Problem List     Patient Active Problem List   Diagnosis    Hypertension    Esophageal reflux    Onychomycosis    Bursitis of heel, right    Lesion of mouth    Rupture  of right quadriceps tendon    Obesity (BMI 30-39.9)    Essential hypertension    Status post total left knee replacement    COVID-19 virus infection    Tinea    Steatosis (HCC)    Class 2 severe obesity with serious comorbidity and body mass index (BMI) of 37.0 to 37.9 in adult (HCC)    Impacted cerumen of right ear       Past Medical History:  Past Medical History:   Diagnosis Date    Acute meniscal tear of left knee     Arthritis     Carpal tunnel syndrome, unspecified upper limb     Hypercholesterolemia     Hypertension     Morbid obesity (HCC)        Past Surgical History:  Past Surgical History:   Procedure Laterality Date    ARTHROSCOPY KNEE      QUADRICEPS REPAIR         Family History:  Family History   Problem Relation Age of Onset    Atrial fibrillation Mother         CARDIAC PACEMAKER    Parkinsonism Mother     Bleeding Disorder Father         ACTIVE INTERNAL BLEEDING    Liver disease Father         LIVER DAMAGE       Social History:  Social History     Socioeconomic History    Marital status: Single     Spouse name: Not on file    Number of children: Not on file    Years of education: Not on file    Highest education level: Not on file   Occupational History    Not on file   Tobacco Use    Smoking status: Never    Smokeless tobacco: Never   Vaping Use    Vaping status: Never Used   Substance and Sexual Activity    Alcohol use: No    Drug use: No    Sexual activity: Yes   Other Topics Concern    Not on file   Social History Narrative    Not on file     Social Drivers of Health     Financial Resource Strain: Low Risk  (6/1/2023)    [facility], [facility]    Overall Financial Resource Strain (CARDIA)     Difficulty of Paying Living Expenses: Not hard at all   Food Insecurity: No Food Insecurity (8/19/2024)    Nursing - Inadequate Food Risk Classification     Worried About Running Out of Food in the Last Year: Never true     Ran Out of Food in the Last Year:  Never true     Ran Out of Food in the Last Year: Not on file   Transportation Needs: No Transportation Needs (8/19/2024)    PRAPARE - Transportation     Lack of Transportation (Medical): No     Lack of Transportation (Non-Medical): No   Physical Activity: Not on file   Stress: Not on file   Social Connections: Not on file   Intimate Partner Violence: Not At Risk (6/1/2023)    [facility], [facility]    Humiliation, Afraid, Rape, and Kick questionnaire     Fear of Current or Ex-Partner: No     Emotionally Abused: No     Physically Abused: No     Sexually Abused: No   Housing Stability: Unknown (8/19/2024)    Housing Stability Vital Sign     Unable to Pay for Housing in the Last Year: No     Number of Times Moved in the Last Year: Not on file     Homeless in the Last Year: No       Objective     Vitals:    02/26/25 0847   BP: 122/68   Pulse: 65   Resp: 16   Temp: 97.8 °F (36.6 °C)   SpO2: 98%     Wt Readings from Last 3 Encounters:   02/26/25 104 kg (230 lb)   08/26/24 130 kg (285 lb 8 oz)   03/11/24 129 kg (283 lb 9.6 oz)       Physical Exam  Constitutional:       General: He is not in acute distress.     Appearance: Normal appearance. He is not ill-appearing.   HENT:      Head: Normocephalic and atraumatic.      Right Ear: Tympanic membrane, ear canal and external ear normal. There is impacted cerumen.      Left Ear: Tympanic membrane, ear canal and external ear normal. There is no impacted cerumen.   Eyes:      General:         Right eye: No discharge.         Left eye: No discharge.      Extraocular Movements: Extraocular movements intact.      Conjunctiva/sclera: Conjunctivae normal.      Pupils: Pupils are equal, round, and reactive to light.   Neck:      Vascular: No carotid bruit.   Cardiovascular:      Rate and Rhythm: Normal rate and regular rhythm.      Heart sounds: Normal heart sounds. No murmur heard.  Pulmonary:      Effort: Pulmonary effort is normal.     "  Breath sounds: Normal breath sounds. No wheezing, rhonchi or rales.   Abdominal:      General: Abdomen is flat. Bowel sounds are normal. There is no distension.      Palpations: Abdomen is soft.      Tenderness: There is no abdominal tenderness. There is no guarding or rebound.   Musculoskeletal:      Right lower leg: No edema.      Left lower leg: No edema.   Lymphadenopathy:      Cervical: No cervical adenopathy.   Skin:     Findings: No rash.   Neurological:      General: No focal deficit present.      Mental Status: He is alert and oriented to person, place, and time.      Cranial Nerves: No cranial nerve deficit.   Psychiatric:         Mood and Affect: Mood normal.         Behavior: Behavior normal.         Thought Content: Thought content normal.         Judgment: Judgment normal.         Pertinent Laboratory/Diagnostic Studies:  Lab Results   Component Value Date    GLUCOSE 84 10/17/2013    BUN 17 02/22/2025    CREATININE 0.93 02/22/2025    CALCIUM 9.6 02/22/2025     02/18/2017    K 4.2 02/22/2025    CO2 29 02/22/2025     02/22/2025     Lab Results   Component Value Date    ALT 14 02/22/2025    AST 16 02/22/2025    ALKPHOS 67 02/22/2025    BILITOT 0.6 02/18/2017       Lab Results   Component Value Date    WBC 5.7 02/22/2025    HGB 14.9 02/22/2025    HCT 45.0 02/22/2025    MCV 90.5 02/22/2025     02/22/2025       Lab Results   Component Value Date    TSH 2.36 02/22/2025       Lab Results   Component Value Date    CHOL 175 10/17/2013     Lab Results   Component Value Date    TRIG 57 02/22/2025     Lab Results   Component Value Date    HDL 46 02/22/2025     Lab Results   Component Value Date    LDLCALC 100 (H) 02/22/2025     No results found for: \"HGBA1C\"    Results for orders placed or performed in visit on 02/22/25   Lipid panel   Result Value Ref Range    Total Cholesterol 160 <200 mg/dL    HDL 46 > OR = 40 mg/dL    Triglycerides 57 <150 mg/dL    LDL Calculated 100 (H) mg/dL (calc)    " Chol HDLC Ratio 3.5 <5.0 (calc)    Non-HDL Cholesterol 114 <130 mg/dL (calc)   Comprehensive metabolic panel   Result Value Ref Range    Glucose, Random 85 65 - 99 mg/dL    BUN 17 7 - 25 mg/dL    Creatinine 0.93 0.70 - 1.30 mg/dL    eGFR 96 > OR = 60 mL/min/1.73m2    SL AMB BUN/CREATININE RATIO SEE NOTE: 6 - 22 (calc)    Sodium 141 135 - 146 mmol/L    Potassium 4.2 3.5 - 5.3 mmol/L    Chloride 103 98 - 110 mmol/L    CO2 29 20 - 32 mmol/L    Calcium 9.6 8.6 - 10.3 mg/dL    Protein, Total 6.9 6.1 - 8.1 g/dL    Albumin 4.4 3.6 - 5.1 g/dL    Globulin 2.5 1.9 - 3.7 g/dL (calc)    Albumin/Globulin Ratio 1.8 1.0 - 2.5 (calc)    TOTAL BILIRUBIN 0.7 0.2 - 1.2 mg/dL    Alkaline Phosphatase 67 35 - 144 U/L    AST 16 10 - 35 U/L    ALT 14 9 - 46 U/L   CBC and differential   Result Value Ref Range    White Blood Cell Count 5.7 3.8 - 10.8 Thousand/uL    Red Blood Cell Count 4.97 4.20 - 5.80 Million/uL    Hemoglobin 14.9 13.2 - 17.1 g/dL    HCT 45.0 38.5 - 50.0 %    MCV 90.5 80.0 - 100.0 fL    MCH 30.0 27.0 - 33.0 pg    MCHC 33.1 32.0 - 36.0 g/dL    RDW 12.4 11.0 - 15.0 %    Platelet Count 302 140 - 400 Thousand/uL    SL AMB MPV 10.6 7.5 - 12.5 fL    Neutrophils (Absolute) 3,369 1,500 - 7,800 cells/uL    Lymphocytes (Absolute) 1,590 850 - 3,900 cells/uL    Monocytes (Absolute) 559 200 - 950 cells/uL    Eosinophils (Absolute) 143 15 - 500 cells/uL    Basophils ABS 40 0 - 200 cells/uL    Neutrophils 59.1 %    Lymphocytes 27.9 %    Monocytes 9.8 %    Eosinophils 2.5 %    Basophils PCT 0.7 %   TSH, 3rd generation   Result Value Ref Range    TSH 2.36 0.40 - 4.50 mIU/L   PSA, Total Screen   Result Value Ref Range    Prostate Specific Antigen Total 0.98 < OR = 4.00 ng/mL       No orders of the defined types were placed in this encounter.      ALLERGIES:  No Known Allergies    Current Medications     Current Outpatient Medications   Medication Sig Dispense Refill    Semaglutide-Weight Management (Wegovy) 2.4 MG/0.75ML Inject 2.4 mg under  the skin weekly 9 mL 1    valsartan-hydrochlorothiazide (DIOVAN-HCT) 80-12.5 MG per tablet TAKE 1 TABLET BY MOUTH EVERY DAY 90 tablet 3     No current facility-administered medications for this visit.         Health Maintenance     Health Maintenance   Topic Date Due    Hepatitis C Screening  Never done    HIV Screening  Never done    DTaP,Tdap,and Td Vaccines (1 - Tdap) Never done    COVID-19 Vaccine (6 - 2024-25 season) 09/01/2024    Annual Physical  08/26/2025    Depression Screening  02/26/2026    Colorectal Cancer Screening  11/04/2032    Zoster Vaccine  Completed    Influenza Vaccine  Completed    Meningococcal B Vaccine  Aged Out    RSV Vaccine age 0-20 Months  Aged Out    Pneumococcal Vaccine: Pediatrics (0 to 5 Years) and At-Risk Patients (6 to 64 Years)  Aged Out    HIB Vaccine  Aged Out    IPV Vaccine  Aged Out    Hepatitis A Vaccine  Aged Out    Meningococcal ACWY Vaccine  Aged Out    HPV Vaccine  Aged Out     Immunization History   Administered Date(s) Administered    COVID-19 MODERNA VACC 0.5 ML IM 01/21/2021, 02/19/2021, 11/11/2021, 05/27/2022    COVID-19 Moderna Vac BIVALENT 12 Yr+ IM 0.5 ML 11/14/2022    INFLUENZA 10/23/2013, 10/01/2018, 09/30/2024    Influenza, injectable, quadrivalent, preservative free 0.5 mL 09/19/2020    Influenza, recombinant, quadrivalent,injectable, preservative free 11/05/2019, 10/09/2021    Influenza, seasonal, injectable 10/04/2016    Influenza, seasonal, injectable, preservative free 10/01/2018    Zoster Vaccine Recombinant 04/26/2022, 08/18/2022       Ward Muñiz MD

## 2025-02-26 NOTE — ASSESSMENT & PLAN NOTE
Cerumen impaction.  Patient was given recommendation to use over-the-counter Debrox drops to use 5 drops twice daily for 7 days.  He will call if symptoms persist after 1 week where we  will reevaluate

## 2025-02-26 NOTE — ASSESSMENT & PLAN NOTE
Obesity.  Patient has had very good success at losing weight up to 55 pounds.  He would like to continue with current dose of Wegovy.  He was recommended to incorporate a walking regimen or using his stationary exercise bicycle for more consistent form of exercise

## 2025-03-16 DIAGNOSIS — I10 ESSENTIAL HYPERTENSION: ICD-10-CM

## 2025-03-17 RX ORDER — VALSARTAN AND HYDROCHLOROTHIAZIDE 80; 12.5 MG/1; MG/1
1 TABLET, FILM COATED ORAL DAILY
Qty: 90 TABLET | Refills: 1 | Status: SHIPPED | OUTPATIENT
Start: 2025-03-17

## 2025-03-26 ENCOUNTER — OFFICE VISIT (OUTPATIENT)
Dept: FAMILY MEDICINE CLINIC | Facility: CLINIC | Age: 55
End: 2025-03-26
Payer: COMMERCIAL

## 2025-03-26 ENCOUNTER — TELEPHONE (OUTPATIENT)
Age: 55
End: 2025-03-26

## 2025-03-26 VITALS
HEIGHT: 73 IN | TEMPERATURE: 97.8 F | OXYGEN SATURATION: 98 % | WEIGHT: 224.4 LBS | DIASTOLIC BLOOD PRESSURE: 62 MMHG | HEART RATE: 70 BPM | SYSTOLIC BLOOD PRESSURE: 112 MMHG | RESPIRATION RATE: 16 BRPM | BODY MASS INDEX: 29.74 KG/M2

## 2025-03-26 DIAGNOSIS — H65.01 RIGHT ACUTE SEROUS OTITIS MEDIA, RECURRENCE NOT SPECIFIED: ICD-10-CM

## 2025-03-26 DIAGNOSIS — I10 ESSENTIAL HYPERTENSION: Primary | ICD-10-CM

## 2025-03-26 DIAGNOSIS — E66.01 CLASS 2 SEVERE OBESITY WITH SERIOUS COMORBIDITY AND BODY MASS INDEX (BMI) OF 37.0 TO 37.9 IN ADULT, UNSPECIFIED OBESITY TYPE (HCC): ICD-10-CM

## 2025-03-26 DIAGNOSIS — E66.812 CLASS 2 SEVERE OBESITY WITH SERIOUS COMORBIDITY AND BODY MASS INDEX (BMI) OF 37.0 TO 37.9 IN ADULT, UNSPECIFIED OBESITY TYPE (HCC): ICD-10-CM

## 2025-03-26 PROCEDURE — 99214 OFFICE O/P EST MOD 30 MIN: CPT | Performed by: FAMILY MEDICINE

## 2025-03-26 RX ORDER — SEMAGLUTIDE 2.4 MG/.75ML
INJECTION, SOLUTION SUBCUTANEOUS
Qty: 9 ML | Refills: 1 | Status: SHIPPED | OUTPATIENT
Start: 2025-03-26

## 2025-03-26 RX ORDER — NEOMYCIN SULFATE, POLYMYXIN B SULFATE AND HYDROCORTISONE 10; 3.5; 1 MG/ML; MG/ML; [USP'U]/ML
SUSPENSION/ DROPS AURICULAR (OTIC)
Qty: 10 ML | Refills: 0 | Status: SHIPPED | OUTPATIENT
Start: 2025-03-26

## 2025-03-26 NOTE — PROGRESS NOTES
FAMILY PRACTICE OFFICE VISIT       NAME: Bob Martínez  AGE: 54 y.o. SEX: male       : 1970        MRN: [id number]    DATE: 3/26/2025  TIME: 12:22 PM    Assessment and Plan     Problem List Items Addressed This Visit       Essential hypertension - Primary    Hypertension.  The patient's blood pressure is stable at this time and he will continue current regimen of medications         Class 2 severe obesity with serious comorbidity and body mass index (BMI) of 37.0 to 37.9 in adult (HCC)    Obesity.  Patient has done very well using Wegovy and exercise.  He will consider trying to use a pharmaceutical coupon card to try and maintain his use of Wegovy.  He is also attempting to get another place of employment that we will provide commercial insurance.         Relevant Medications    Semaglutide-Weight Management (Wegovy) 2.4 MG/0.75ML    Right acute serous otitis media    .  Patient given prescription for Cortisporin otic suspension to use 5 drops twice daily for 7 days.  If symptoms persist after medication completed patient will call the office and we will refer for ENT consultation         Relevant Medications    neomycin-polymyxin-hydrocortisone (CORTISPORIN) 0.35%-10,000 units/mL-1% otic suspension           Chief Complaint     Chief Complaint   Patient presents with    Earache       History of Present Illness     Patient states for the past several weeks he has been having intermittent right ear discomfort.  He denies any recent illness.  He has no changes in hearing acuity.    Patient recently lost his job due to downsizing.  He is concerned about coming off Wegovy which has been very effective for patient.  He has been able to lose approximately 60 pounds in the last year with Wegovy and exercise.    Earache         Review of Systems   Review of Systems   Constitutional: Negative.    HENT:  Positive for ear pain.        Active Problem List     Patient Active Problem List   Diagnosis     Hypertension    Esophageal reflux    Onychomycosis    Bursitis of heel, right    Lesion of mouth    Rupture of right quadriceps tendon    Obesity (BMI 30-39.9)    Essential hypertension    Status post total left knee replacement    COVID-19 virus infection    Tinea    Steatosis (HCC)    Class 2 severe obesity with serious comorbidity and body mass index (BMI) of 37.0 to 37.9 in adult (HCC)    Impacted cerumen of right ear    Right acute serous otitis media       Past Medical History:  Past Medical History:   Diagnosis Date    Acute meniscal tear of left knee     Arthritis     Carpal tunnel syndrome, unspecified upper limb     Hypercholesterolemia     Hypertension     Morbid obesity (HCC)        Past Surgical History:  Past Surgical History:   Procedure Laterality Date    ARTHROSCOPY KNEE      QUADRICEPS REPAIR         Family History:  Family History   Problem Relation Age of Onset    Atrial fibrillation Mother         CARDIAC PACEMAKER    Parkinsonism Mother     Bleeding Disorder Father         ACTIVE INTERNAL BLEEDING    Liver disease Father         LIVER DAMAGE       Social History:  Social History     Socioeconomic History    Marital status: Single     Spouse name: Not on file    Number of children: Not on file    Years of education: Not on file    Highest education level: Not on file   Occupational History    Not on file   Tobacco Use    Smoking status: Never    Smokeless tobacco: Never   Vaping Use    Vaping status: Never Used   Substance and Sexual Activity    Alcohol use: No    Drug use: No    Sexual activity: Yes   Other Topics Concern    Not on file   Social History Narrative    Not on file     Social Drivers of Health     Financial Resource Strain: Low Risk  (6/1/2023)    [facility], [facility]    Overall Financial Resource Strain (CARDIA)     Difficulty of Paying Living Expenses: Not hard at all   Food Insecurity: No Food Insecurity (8/19/2024)    Nursing  - Inadequate Food Risk Classification     Worried About Running Out of Food in the Last Year: Never true     Ran Out of Food in the Last Year: Never true     Ran Out of Food in the Last Year: Not on file   Transportation Needs: No Transportation Needs (8/19/2024)    PRAPARE - Transportation     Lack of Transportation (Medical): No     Lack of Transportation (Non-Medical): No   Physical Activity: Not on file   Stress: Not on file   Social Connections: Not on file   Intimate Partner Violence: Not At Risk (6/1/2023)    [facility], [facility]    Humiliation, Afraid, Rape, and Kick questionnaire     Fear of Current or Ex-Partner: No     Emotionally Abused: No     Physically Abused: No     Sexually Abused: No   Housing Stability: Unknown (8/19/2024)    Housing Stability Vital Sign     Unable to Pay for Housing in the Last Year: No     Number of Times Moved in the Last Year: Not on file     Homeless in the Last Year: No       Objective     Vitals:    03/26/25 0837   BP: 112/62   Pulse: 70   Resp: 16   Temp: 97.8 °F (36.6 °C)   SpO2: 98%     Wt Readings from Last 3 Encounters:   03/26/25 102 kg (224 lb 6.4 oz)   02/26/25 104 kg (230 lb)   08/26/24 130 kg (285 lb 8 oz)       Physical Exam  Constitutional:       General: He is not in acute distress.     Appearance: Normal appearance. He is not ill-appearing.   HENT:      Right Ear: Ear canal and external ear normal. There is no impacted cerumen.      Left Ear: Tympanic membrane, ear canal and external ear normal. There is no impacted cerumen.      Ears:      Comments: Patient with dull light reflex of right tympanic membrane.     Mouth/Throat:      Mouth: Mucous membranes are moist.      Pharynx: No oropharyngeal exudate or posterior oropharyngeal erythema.   Eyes:      General:         Right eye: No discharge.         Left eye: No discharge.      Extraocular Movements: Extraocular movements intact.      Conjunctiva/sclera:  "Conjunctivae normal.      Pupils: Pupils are equal, round, and reactive to light.   Cardiovascular:      Rate and Rhythm: Normal rate and regular rhythm.      Heart sounds: Normal heart sounds. No murmur heard.  Pulmonary:      Effort: Pulmonary effort is normal. No respiratory distress.      Breath sounds: Normal breath sounds. No wheezing or rales.   Lymphadenopathy:      Cervical: No cervical adenopathy.   Neurological:      Mental Status: He is alert.         Pertinent Laboratory/Diagnostic Studies:  Lab Results   Component Value Date    GLUCOSE 84 10/17/2013    BUN 17 02/22/2025    CREATININE 0.93 02/22/2025    CALCIUM 9.6 02/22/2025     02/18/2017    K 4.2 02/22/2025    CO2 29 02/22/2025     02/22/2025     Lab Results   Component Value Date    ALT 14 02/22/2025    AST 16 02/22/2025    ALKPHOS 67 02/22/2025    BILITOT 0.6 02/18/2017       Lab Results   Component Value Date    WBC 5.7 02/22/2025    HGB 14.9 02/22/2025    HCT 45.0 02/22/2025    MCV 90.5 02/22/2025     02/22/2025       Lab Results   Component Value Date    TSH 2.36 02/22/2025       Lab Results   Component Value Date    CHOL 175 10/17/2013     Lab Results   Component Value Date    TRIG 57 02/22/2025     Lab Results   Component Value Date    HDL 46 02/22/2025     Lab Results   Component Value Date    LDLCALC 100 (H) 02/22/2025     No results found for: \"HGBA1C\"    Results for orders placed or performed in visit on 02/22/25   Lipid panel   Result Value Ref Range    Total Cholesterol 160 <200 mg/dL    HDL 46 > OR = 40 mg/dL    Triglycerides 57 <150 mg/dL    LDL Calculated 100 (H) mg/dL (calc)    Chol HDLC Ratio 3.5 <5.0 (calc)    Non-HDL Cholesterol 114 <130 mg/dL (calc)   Comprehensive metabolic panel   Result Value Ref Range    Glucose, Random 85 65 - 99 mg/dL    BUN 17 7 - 25 mg/dL    Creatinine 0.93 0.70 - 1.30 mg/dL    eGFR 96 > OR = 60 mL/min/1.73m2    SL AMB BUN/CREATININE RATIO SEE NOTE: 6 - 22 (calc)    Sodium 141 135 - 146 " mmol/L    Potassium 4.2 3.5 - 5.3 mmol/L    Chloride 103 98 - 110 mmol/L    CO2 29 20 - 32 mmol/L    Calcium 9.6 8.6 - 10.3 mg/dL    Protein, Total 6.9 6.1 - 8.1 g/dL    Albumin 4.4 3.6 - 5.1 g/dL    Globulin 2.5 1.9 - 3.7 g/dL (calc)    Albumin/Globulin Ratio 1.8 1.0 - 2.5 (calc)    TOTAL BILIRUBIN 0.7 0.2 - 1.2 mg/dL    Alkaline Phosphatase 67 35 - 144 U/L    AST 16 10 - 35 U/L    ALT 14 9 - 46 U/L   CBC and differential   Result Value Ref Range    White Blood Cell Count 5.7 3.8 - 10.8 Thousand/uL    Red Blood Cell Count 4.97 4.20 - 5.80 Million/uL    Hemoglobin 14.9 13.2 - 17.1 g/dL    HCT 45.0 38.5 - 50.0 %    MCV 90.5 80.0 - 100.0 fL    MCH 30.0 27.0 - 33.0 pg    MCHC 33.1 32.0 - 36.0 g/dL    RDW 12.4 11.0 - 15.0 %    Platelet Count 302 140 - 400 Thousand/uL    SL AMB MPV 10.6 7.5 - 12.5 fL    Neutrophils (Absolute) 3,369 1,500 - 7,800 cells/uL    Lymphocytes (Absolute) 1,590 850 - 3,900 cells/uL    Monocytes (Absolute) 559 200 - 950 cells/uL    Eosinophils (Absolute) 143 15 - 500 cells/uL    Basophils ABS 40 0 - 200 cells/uL    Neutrophils 59.1 %    Lymphocytes 27.9 %    Monocytes 9.8 %    Eosinophils 2.5 %    Basophils PCT 0.7 %   TSH, 3rd generation   Result Value Ref Range    TSH 2.36 0.40 - 4.50 mIU/L   PSA, Total Screen   Result Value Ref Range    Prostate Specific Antigen Total 0.98 < OR = 4.00 ng/mL       No orders of the defined types were placed in this encounter.      ALLERGIES:  No Known Allergies    Current Medications     Current Outpatient Medications   Medication Sig Dispense Refill    neomycin-polymyxin-hydrocortisone (CORTISPORIN) 0.35%-10,000 units/mL-1% otic suspension Use 5 drops to R ear BID X 7 days 10 mL 0    Semaglutide-Weight Management (Wegovy) 2.4 MG/0.75ML Inject 2.4 mg under the skin weekly 9 mL 1    valsartan-hydrochlorothiazide (DIOVAN-HCT) 80-12.5 MG per tablet TAKE 1 TABLET BY MOUTH EVERY DAY 90 tablet 1     No current facility-administered medications for this visit.          Health Maintenance     Health Maintenance   Topic Date Due    Hepatitis C Screening  Never done    HIV Screening  Never done    DTaP,Tdap,and Td Vaccines (1 - Tdap) Never done    COVID-19 Vaccine (6 - 2024-25 season) 09/01/2024    Annual Physical  08/26/2025    Depression Screening  02/26/2026    Colorectal Cancer Screening  11/04/2032    Zoster Vaccine  Completed    Influenza Vaccine  Completed    Meningococcal B Vaccine  Aged Out    RSV Vaccine age 0-20 Months  Aged Out    Pneumococcal Vaccine: Pediatrics (0 to 5 Years) and At-Risk Patients (6 to 64 Years)  Aged Out    HIB Vaccine  Aged Out    IPV Vaccine  Aged Out    Hepatitis A Vaccine  Aged Out    Meningococcal ACWY Vaccine  Aged Out    HPV Vaccine  Aged Out     Immunization History   Administered Date(s) Administered    COVID-19 MODERNA VACC 0.5 ML IM 01/21/2021, 02/19/2021, 11/11/2021, 05/27/2022    COVID-19 Moderna Vac BIVALENT 12 Yr+ IM 0.5 ML 11/14/2022    INFLUENZA 10/23/2013, 10/01/2018, 09/30/2024    Influenza, injectable, quadrivalent, preservative free 0.5 mL 09/19/2020    Influenza, recombinant, quadrivalent,injectable, preservative free 11/05/2019, 10/09/2021    Influenza, seasonal, injectable 10/04/2016    Influenza, seasonal, injectable, preservative free 10/01/2018    Zoster Vaccine Recombinant 04/26/2022, 08/18/2022       Ward Muñiz MD    I spent 30 minutes with this patient of which greater than 50% was spent counseling or reviewing chart

## 2025-03-26 NOTE — TELEPHONE ENCOUNTER
PA for Wegovy 2.4mg SUBMITTED to [facility]    via    []CMM-KEY:   [x]Surescripts-Case ID #: [id number]   []Availity-Auth ID #  []Faxed to plan   []Other website   []Phone call Case ID     []PA sent as URGENT    All office notes, labs and other pertaining documents and studies sent. Clinical questions answered. Awaiting determination from insurance company.     Turnaround time for your insurance to make a decision on your Prior Authorization can take 7-21 business days.

## 2025-03-26 NOTE — ASSESSMENT & PLAN NOTE
Obesity.  Patient has done very well using Wegovy and exercise.  He will consider trying to use a pharmaceutical coupon card to try and maintain his use of Wegovy.  He is also attempting to get another place of employment that we will provide commercial insurance.

## 2025-03-26 NOTE — ASSESSMENT & PLAN NOTE
.  Patient given prescription for Cortisporin otic suspension to use 5 drops twice daily for 7 days.  If symptoms persist after medication completed patient will call the office and we will refer for ENT consultation

## 2025-03-27 DIAGNOSIS — H65.01 RIGHT ACUTE SEROUS OTITIS MEDIA, RECURRENCE NOT SPECIFIED: ICD-10-CM

## 2025-03-27 RX ORDER — NEOMYCIN SULFATE, POLYMYXIN B SULFATE AND HYDROCORTISONE 10; 3.5; 1 MG/ML; MG/ML; [USP'U]/ML
SUSPENSION/ DROPS AURICULAR (OTIC)
Qty: 10 ML | Refills: 0 | OUTPATIENT
Start: 2025-03-27

## 2025-03-27 NOTE — TELEPHONE ENCOUNTER
PA for Wegovy 2.4mg APPROVED     Date(s) approved March 26, 2025 to March 26, 2026     Case #: [id number]       Patient advised by          []MyChart Message  [x]Phone call   [x]LMOM  []L/M to call office as no active Communication consent on file  []Unable to leave detailed message as VM not approved on Communication consent       Pharmacy advised by    [x]Fax  []Phone call  []Secure Chat         Approval letter scanned into Media Yes

## 2025-03-28 PROBLEM — H61.21 IMPACTED CERUMEN OF RIGHT EAR: Status: RESOLVED | Noted: 2025-02-26 | Resolved: 2025-03-28

## 2025-04-08 ENCOUNTER — TELEPHONE (OUTPATIENT)
Dept: FAMILY MEDICINE CLINIC | Facility: CLINIC | Age: 55
End: 2025-04-08

## 2025-04-08 NOTE — TELEPHONE ENCOUNTER
Patient called refill line again, he spoke with Josy, they explained the cost to him for Wegovy would be $499/mo.  He cannot afford that, he was advised to call provider for suggestions on programs that might help  He has lost weight and is concerned if he stops, he will regain.  Please call to advise.

## 2025-04-08 NOTE — TELEPHONE ENCOUNTER
Patient called the RX Refill Line. Message is being forwarded to the office.     Patient is requesting if his new insurance will now cover Wegovy. I instructed the patient to contact the number on the back of his insurance card and to inquire with a representative.

## 2025-04-08 NOTE — TELEPHONE ENCOUNTER
There is not much we could do if his current insurance would not pay for injectable weight loss medications.  He could either see [facility] weight management department for consultation or see if he gets a new job with a different insurance whether they would cover the cost of Wegovy with a new insurance plan.  If he would like to see weight management office we will place referral in epic and he may call for appointment.  Please provide phone number

## 2025-04-15 ENCOUNTER — OFFICE VISIT (OUTPATIENT)
Dept: FAMILY MEDICINE CLINIC | Facility: CLINIC | Age: 55
End: 2025-04-15
Payer: COMMERCIAL

## 2025-04-15 VITALS
HEIGHT: 73 IN | TEMPERATURE: 98 F | DIASTOLIC BLOOD PRESSURE: 60 MMHG | HEART RATE: 89 BPM | RESPIRATION RATE: 16 BRPM | SYSTOLIC BLOOD PRESSURE: 100 MMHG | OXYGEN SATURATION: 95 % | BODY MASS INDEX: 28.76 KG/M2 | WEIGHT: 217 LBS

## 2025-04-15 DIAGNOSIS — K52.9 GASTROENTERITIS: Primary | ICD-10-CM

## 2025-04-15 DIAGNOSIS — E88.89 STEATOSIS (HCC): ICD-10-CM

## 2025-04-15 PROCEDURE — 99213 OFFICE O/P EST LOW 20 MIN: CPT | Performed by: FAMILY MEDICINE

## 2025-04-15 RX ORDER — DIPHENOXYLATE HYDROCHLORIDE AND ATROPINE SULFATE 2.5; .025 MG/1; MG/1
1 TABLET ORAL 2 TIMES DAILY PRN
Qty: 10 TABLET | Refills: 0 | Status: SHIPPED | OUTPATIENT
Start: 2025-04-15

## 2025-04-15 NOTE — PROGRESS NOTES
FAMILY PRACTICE OFFICE VISIT       NAME: Bob Martínez  AGE: 54 y.o. SEX: male       : 1970        MRN: [id number]    DATE: 4/15/2025  TIME: 1:54 PM    Assessment and Plan     Problem List Items Addressed This Visit       Steatosis (HCC)    Gastroenteritis - Primary    Gastroenteritis.  We discussed the limited nature of viral gastroenteritis.  He will hydrate with over-the-counter Gatorade and water.  He was given Lomotil to use if bowel movements are more than 4-5 times daily.  He will call if symptoms persist without improvement over the next 48 hours         Relevant Medications    diphenoxylate-atropine (LOMOTIL) 2.5-0.025 mg per tablet           Chief Complaint     Chief Complaint   Patient presents with    Diarrhea       History of Present Illness     Patient in the office with a history of diarrhea that began last night.  He denies any recent illness.  He is unaware of any changes in diet.  He has no travel history.  No fevers.  He had 6-8 watery bouts of diarrhea.  He had 1 episode of nausea and vomiting.  As of this morning he has had 2 loose bowel movements.    Diarrhea         Review of Systems   Review of Systems   Constitutional: Negative.    HENT: Negative.     Respiratory: Negative.     Cardiovascular: Negative.    Gastrointestinal:  Positive for diarrhea.       Active Problem List     Patient Active Problem List   Diagnosis    Hypertension    Esophageal reflux    Onychomycosis    Bursitis of heel, right    Lesion of mouth    Rupture of right quadriceps tendon    Obesity (BMI 30-39.9)    Essential hypertension    Status post total left knee replacement    COVID-19 virus infection    Tinea    Steatosis (HCC)    Class 2 severe obesity with serious comorbidity and body mass index (BMI) of 37.0 to 37.9 in adult (HCC)    Right acute serous otitis media    Gastroenteritis       Past Medical History:  Past Medical History:   Diagnosis Date    Acute meniscal tear of left knee     Arthritis      Carpal tunnel syndrome, unspecified upper limb     Hypercholesterolemia     Hypertension     Morbid obesity (HCC)        Past Surgical History:  Past Surgical History:   Procedure Laterality Date    ARTHROSCOPY KNEE      QUADRICEPS REPAIR         Family History:  Family History   Problem Relation Age of Onset    Atrial fibrillation Mother         CARDIAC PACEMAKER    Parkinsonism Mother     Bleeding Disorder Father         ACTIVE INTERNAL BLEEDING    Liver disease Father         LIVER DAMAGE       Social History:  Social History     Socioeconomic History    Marital status: Single     Spouse name: Not on file    Number of children: Not on file    Years of education: Not on file    Highest education level: Not on file   Occupational History    Not on file   Tobacco Use    Smoking status: Never    Smokeless tobacco: Never   Vaping Use    Vaping status: Never Used   Substance and Sexual Activity    Alcohol use: No    Drug use: No    Sexual activity: Yes   Other Topics Concern    Not on file   Social History Narrative    Not on file     Social Drivers of Health     Financial Resource Strain: Low Risk  (6/1/2023)    Received from [facility]    Overall Financial Resource Strain (CARDIA)     Difficulty of Paying Living Expenses: Not hard at all   Food Insecurity: No Food Insecurity (8/19/2024)    Nursing - Inadequate Food Risk Classification     Worried About Running Out of Food in the Last Year: Never true     Ran Out of Food in the Last Year: Never true     Ran Out of Food in the Last Year: Not on file   Transportation Needs: No Transportation Needs (8/19/2024)    PRAPARE - Transportation     Lack of Transportation (Medical): No     Lack of Transportation (Non-Medical): No   Physical Activity: Not on file   Stress: Not on file   Social Connections: Not on file   Intimate Partner Violence: Not At Risk (6/1/2023)    Received from [facility]    Humiliation, Afraid, Rape, and Kick questionnaire     Fear of Current or Ex-Partner: No     Emotionally Abused: No     Physically Abused: No     Sexually Abused: No   Housing Stability: Unknown (8/19/2024)    Housing Stability Vital Sign     Unable to Pay for Housing in the Last Year: No     Number of Times Moved in the Last Year: Not on file     Homeless in the Last Year: No       Objective     Vitals:    04/15/25 1256   BP: 100/60   Pulse: 89   Resp: 16   Temp: 98 °F (36.7 °C)   SpO2: 95%     Wt Readings from Last 3 Encounters:   04/15/25 98.4 kg (217 lb)   03/26/25 102 kg (224 lb 6.4 oz)   02/26/25 104 kg (230 lb)       Physical Exam  Constitutional:       General: He is not in acute distress.     Appearance: Normal appearance. He is not ill-appearing.   HENT:      Head: Normocephalic and atraumatic.   Eyes:      General:         Right eye: No discharge.         Left eye: No discharge.      Extraocular Movements: Extraocular movements intact.      Conjunctiva/sclera: Conjunctivae normal.      Pupils: Pupils are equal, round, and reactive to light.   Neck:      Vascular: No carotid bruit.   Cardiovascular:      Rate and Rhythm: Normal rate and regular rhythm.      Heart sounds: Normal heart sounds. No murmur heard.  Pulmonary:      Effort: Pulmonary effort is normal.      Breath sounds: Normal breath sounds. No wheezing, rhonchi or rales.   Abdominal:      General: Abdomen is flat. Bowel sounds are normal. There is no distension.      Palpations: Abdomen is soft.      Tenderness: There is no abdominal tenderness. There is no guarding or rebound.   Musculoskeletal:      Right lower leg: No edema.      Left lower leg: No edema.   Lymphadenopathy:      Cervical: No cervical adenopathy.   Skin:     Findings: No rash.   Neurological:      General: No focal deficit present.      Mental Status: He is alert and oriented to person, place, and time.      Cranial Nerves: No cranial nerve deficit.  "  Psychiatric:         Mood and Affect: Mood normal.         Behavior: Behavior normal.         Thought Content: Thought content normal.         Judgment: Judgment normal.         Pertinent Laboratory/Diagnostic Studies:  Lab Results   Component Value Date    GLUCOSE 84 10/17/2013    BUN 17 02/22/2025    CREATININE 0.93 02/22/2025    CALCIUM 9.6 02/22/2025     02/18/2017    K 4.2 02/22/2025    CO2 29 02/22/2025     02/22/2025     Lab Results   Component Value Date    ALT 14 02/22/2025    AST 16 02/22/2025    ALKPHOS 67 02/22/2025    BILITOT 0.6 02/18/2017       Lab Results   Component Value Date    WBC 5.7 02/22/2025    HGB 14.9 02/22/2025    HCT 45.0 02/22/2025    MCV 90.5 02/22/2025     02/22/2025       Lab Results   Component Value Date    TSH 2.36 02/22/2025       Lab Results   Component Value Date    CHOL 175 10/17/2013     Lab Results   Component Value Date    TRIG 57 02/22/2025     Lab Results   Component Value Date    HDL 46 02/22/2025     Lab Results   Component Value Date    LDLCALC 100 (H) 02/22/2025     No results found for: \"HGBA1C\"    Results for orders placed or performed in visit on 02/22/25   Lipid panel   Result Value Ref Range    Total Cholesterol 160 <200 mg/dL    HDL 46 > OR = 40 mg/dL    Triglycerides 57 <150 mg/dL    LDL Calculated 100 (H) mg/dL (calc)    Chol HDLC Ratio 3.5 <5.0 (calc)    Non-HDL Cholesterol 114 <130 mg/dL (calc)   Comprehensive metabolic panel   Result Value Ref Range    Glucose, Random 85 65 - 99 mg/dL    BUN 17 7 - 25 mg/dL    Creatinine 0.93 0.70 - 1.30 mg/dL    eGFR 96 > OR = 60 mL/min/1.73m2    SL AMB BUN/CREATININE RATIO SEE NOTE: 6 - 22 (calc)    Sodium 141 135 - 146 mmol/L    Potassium 4.2 3.5 - 5.3 mmol/L    Chloride 103 98 - 110 mmol/L    CO2 29 20 - 32 mmol/L    Calcium 9.6 8.6 - 10.3 mg/dL    Protein, Total 6.9 6.1 - 8.1 g/dL    Albumin 4.4 3.6 - 5.1 g/dL    Globulin 2.5 1.9 - 3.7 g/dL (calc)    Albumin/Globulin Ratio 1.8 1.0 - 2.5 (calc)    " TOTAL BILIRUBIN 0.7 0.2 - 1.2 mg/dL    Alkaline Phosphatase 67 35 - 144 U/L    AST 16 10 - 35 U/L    ALT 14 9 - 46 U/L   CBC and differential   Result Value Ref Range    White Blood Cell Count 5.7 3.8 - 10.8 Thousand/uL    Red Blood Cell Count 4.97 4.20 - 5.80 Million/uL    Hemoglobin 14.9 13.2 - 17.1 g/dL    HCT 45.0 38.5 - 50.0 %    MCV 90.5 80.0 - 100.0 fL    MCH 30.0 27.0 - 33.0 pg    MCHC 33.1 32.0 - 36.0 g/dL    RDW 12.4 11.0 - 15.0 %    Platelet Count 302 140 - 400 Thousand/uL    SL AMB MPV 10.6 7.5 - 12.5 fL    Neutrophils (Absolute) 3,369 1,500 - 7,800 cells/uL    Lymphocytes (Absolute) 1,590 850 - 3,900 cells/uL    Monocytes (Absolute) 559 200 - 950 cells/uL    Eosinophils (Absolute) 143 15 - 500 cells/uL    Basophils ABS 40 0 - 200 cells/uL    Neutrophils 59.1 %    Lymphocytes 27.9 %    Monocytes 9.8 %    Eosinophils 2.5 %    Basophils PCT 0.7 %   TSH, 3rd generation   Result Value Ref Range    TSH 2.36 0.40 - 4.50 mIU/L   PSA, Total Screen   Result Value Ref Range    Prostate Specific Antigen Total 0.98 < OR = 4.00 ng/mL       No orders of the defined types were placed in this encounter.      ALLERGIES:  No Known Allergies    Current Medications     Current Outpatient Medications   Medication Sig Dispense Refill    diphenoxylate-atropine (LOMOTIL) 2.5-0.025 mg per tablet Take 1 tablet by mouth 2 (two) times a day as needed for diarrhea 10 tablet 0    Semaglutide-Weight Management (Wegovy) 2.4 MG/0.75ML Inject 2.4 mg under the skin weekly 9 mL 1    valsartan-hydrochlorothiazide (DIOVAN-HCT) 80-12.5 MG per tablet TAKE 1 TABLET BY MOUTH EVERY DAY 90 tablet 1    neomycin-polymyxin-hydrocortisone (CORTISPORIN) 0.35%-10,000 units/mL-1% otic suspension Use 5 drops to R ear BID X 7 days (Patient not taking: Reported on 4/15/2025) 10 mL 0     No current facility-administered medications for this visit.         Health Maintenance     Health Maintenance   Topic Date Due    Hepatitis C Screening  Never done    HIV  Screening  Never done    DTaP,Tdap,and Td Vaccines (1 - Tdap) Never done    COVID-19 Vaccine (6 - 2024-25 season) 09/01/2024    Annual Physical  08/26/2025    Depression Screening  02/26/2026    Colorectal Cancer Screening  11/04/2032    Zoster Vaccine  Completed    Influenza Vaccine  Completed    Meningococcal B Vaccine  Aged Out    RSV Vaccine age 0-20 Months  Aged Out    Pneumococcal Vaccine: Pediatrics (0 to 5 Years) and At-Risk Patients (6 to 64 Years)  Aged Out    HIB Vaccine  Aged Out    IPV Vaccine  Aged Out    Hepatitis A Vaccine  Aged Out    Meningococcal ACWY Vaccine  Aged Out    HPV Vaccine  Aged Out     Immunization History   Administered Date(s) Administered    COVID-19 MODERNA VACC 0.5 ML IM 01/21/2021, 02/19/2021, 11/11/2021, 05/27/2022    COVID-19 Moderna Vac BIVALENT 12 Yr+ IM 0.5 ML 11/14/2022    INFLUENZA 10/23/2013, 10/01/2018, 09/30/2024    Influenza, injectable, quadrivalent, preservative free 0.5 mL 09/19/2020    Influenza, recombinant, quadrivalent,injectable, preservative free 11/05/2019, 10/09/2021    Influenza, seasonal, injectable 10/04/2016    Influenza, seasonal, injectable, preservative free 10/01/2018    Zoster Vaccine Recombinant 04/26/2022, 08/18/2022       Ward Muñiz MD

## 2025-04-15 NOTE — ASSESSMENT & PLAN NOTE
Gastroenteritis.  We discussed the limited nature of viral gastroenteritis.  He will hydrate with over-the-counter Gatorade and water.  He was given Lomotil to use if bowel movements are more than 4-5 times daily.  He will call if symptoms persist without improvement over the next 48 hours

## 2025-04-17 ENCOUNTER — TELEPHONE (OUTPATIENT)
Age: 55
End: 2025-04-17

## 2025-04-17 DIAGNOSIS — K52.9 GASTROENTERITIS: Primary | ICD-10-CM

## 2025-04-17 RX ORDER — CIPROFLOXACIN 500 MG/1
500 TABLET, FILM COATED ORAL EVERY 12 HOURS SCHEDULED
Qty: 10 TABLET | Refills: 0 | Status: SHIPPED | OUTPATIENT
Start: 2025-04-17 | End: 2025-04-22

## 2025-04-17 NOTE — TELEPHONE ENCOUNTER
Patient called he was seen 4/15/25 and he is still having diarrhea, upset stomach and belching.  He said the Lomotil did not help so far.    Confirmed [facility] pharmacy in [place]    Thank you

## 2025-04-25 PROBLEM — H65.01 RIGHT ACUTE SEROUS OTITIS MEDIA: Status: RESOLVED | Noted: 2025-03-26 | Resolved: 2025-04-25

## 2025-05-15 PROBLEM — K52.9 GASTROENTERITIS: Status: RESOLVED | Noted: 2025-04-15 | Resolved: 2025-05-15

## 2025-06-16 ENCOUNTER — OFFICE VISIT (OUTPATIENT)
Dept: FAMILY MEDICINE CLINIC | Facility: CLINIC | Age: 55
End: 2025-06-16
Payer: COMMERCIAL

## 2025-06-16 VITALS
HEART RATE: 67 BPM | SYSTOLIC BLOOD PRESSURE: 100 MMHG | BODY MASS INDEX: 29.55 KG/M2 | HEIGHT: 73 IN | WEIGHT: 223 LBS | OXYGEN SATURATION: 100 % | RESPIRATION RATE: 16 BRPM | TEMPERATURE: 97.8 F | DIASTOLIC BLOOD PRESSURE: 70 MMHG

## 2025-06-16 DIAGNOSIS — R39.9 UTI SYMPTOMS: Primary | ICD-10-CM

## 2025-06-16 PROBLEM — U07.1 COVID-19 VIRUS INFECTION: Status: RESOLVED | Noted: 2024-01-14 | Resolved: 2025-06-16

## 2025-06-16 LAB
SL AMB  POCT GLUCOSE, UA: NORMAL
SL AMB LEUKOCYTE ESTERASE,UA: NORMAL
SL AMB POCT BILIRUBIN,UA: NORMAL
SL AMB POCT BLOOD,UA: NORMAL
SL AMB POCT CLARITY,UA: CLEAR
SL AMB POCT COLOR,UA: YELLOW
SL AMB POCT KETONES,UA: NORMAL
SL AMB POCT NITRITE,UA: NORMAL
SL AMB POCT PH,UA: 6
SL AMB POCT SPECIFIC GRAVITY,UA: 1.01
SL AMB POCT URINE PROTEIN: NORMAL
SL AMB POCT UROBILINOGEN: NORMAL

## 2025-06-16 PROCEDURE — 87086 URINE CULTURE/COLONY COUNT: CPT | Performed by: NURSE PRACTITIONER

## 2025-06-16 PROCEDURE — 81003 URINALYSIS AUTO W/O SCOPE: CPT | Performed by: NURSE PRACTITIONER

## 2025-06-16 PROCEDURE — 99213 OFFICE O/P EST LOW 20 MIN: CPT | Performed by: NURSE PRACTITIONER

## 2025-06-16 RX ORDER — CEPHALEXIN 500 MG/1
500 CAPSULE ORAL EVERY 8 HOURS SCHEDULED
Qty: 21 CAPSULE | Refills: 0 | Status: SHIPPED | OUTPATIENT
Start: 2025-06-16 | End: 2025-06-19 | Stop reason: ALTCHOICE

## 2025-06-16 NOTE — PROGRESS NOTES
"Name: Bob Martínez      : 1970      MRN: [id number]  Encounter Provider: WOJCIECH Girard  Encounter Date: 2025   Encounter department: [facility]  :  Assessment & Plan  UTI symptoms  Urinary frequency with left groin pain associated with voiding.   Denies any recent sexual activity >6 months.   Urine dip is clear, no sign of infection or blood in urine.   ?UTI, prostatitis, kidney stone.   Will send urine culture.   Start Keflex for possible UTI.   Office will call with results of urine culture when available, and will check at that time how he is feeling.   He will call me with any worsening or persisting symptoms.     Orders:  •  POCT urine dip auto non-scope  •  Urine culture  •  cephalexin (KEFLEX) 500 mg capsule; Take 1 capsule (500 mg total) by mouth every 8 (eight) hours for 7 days           History of Present Illness   Bob Martínez is a 54 year old male presenting today for urinary frequency.     Symptoms started Saturday, 2 days ago.   Pain in left groin with urinating only.   Increase in urinary frequency.   No urgency.   No change in urinary stream.     No fevers, chills, nausea, vomiting, back pain.     No urine color changes or odor.     Has never had a UTI before.     Sexually active--not at all for >6 months.     PSA in February was good, 0.98.    Drinking gatorade for fluid intake.   At most 2 (20 Ounce bottles) gatorades per day.   Not much else for fluids.                                   Review of Systems   Constitutional: Negative.    Gastrointestinal:  Negative for nausea and vomiting.   Genitourinary:  Positive for dysuria and frequency. Negative for decreased urine volume, difficulty urinating, flank pain, hematuria, penile pain, penile swelling, scrotal swelling, testicular pain and urgency. Enuresis: left groin pain with voiding..      Objective   /70   Pulse 67   Temp 97.8 °F (36.6 °C) (Temporal)   Resp 16   Ht 6' 1\" (1.854 m)   Wt 101 " kg (223 lb)   SpO2 100%   BMI 29.42 kg/m²      Physical Exam  Vitals and nursing note reviewed.   Constitutional:       General: He is not in acute distress.     Appearance: Normal appearance. He is not ill-appearing.     Cardiovascular:      Rate and Rhythm: Normal rate and regular rhythm.      Heart sounds: No murmur heard.  Pulmonary:      Effort: Pulmonary effort is normal.      Breath sounds: Normal breath sounds.   Abdominal:      General: Abdomen is flat.      Palpations: Abdomen is soft.      Tenderness: There is no abdominal tenderness. There is no right CVA tenderness, left CVA tenderness or guarding.     Musculoskeletal:      Right lower leg: No edema.      Left lower leg: No edema.     Neurological:      Mental Status: He is alert.     Psychiatric:         Mood and Affect: Mood normal.         Current Medications[1]          [1]    Current Outpatient Medications:   •  cephalexin (KEFLEX) 500 mg capsule, Take 1 capsule (500 mg total) by mouth every 8 (eight) hours for 7 days, Disp: 21 capsule, Rfl: 0  •  Semaglutide-Weight Management (Wegovy) 2.4 MG/0.75ML, Inject 2.4 mg under the skin weekly, Disp: 9 mL, Rfl: 1  •  valsartan-hydrochlorothiazide (DIOVAN-HCT) 80-12.5 MG per tablet, TAKE 1 TABLET BY MOUTH EVERY DAY, Disp: 90 tablet, Rfl: 1

## 2025-06-18 LAB — BACTERIA UR CULT: NORMAL

## 2025-06-19 ENCOUNTER — TELEPHONE (OUTPATIENT)
Dept: FAMILY MEDICINE CLINIC | Facility: CLINIC | Age: 55
End: 2025-06-19

## 2025-06-19 ENCOUNTER — RESULTS FOLLOW-UP (OUTPATIENT)
Dept: FAMILY MEDICINE CLINIC | Facility: CLINIC | Age: 55
End: 2025-06-19

## 2025-06-19 DIAGNOSIS — N41.0 ACUTE PROSTATITIS: Primary | ICD-10-CM

## 2025-06-19 RX ORDER — SULFAMETHOXAZOLE AND TRIMETHOPRIM 800; 160 MG/1; MG/1
1 TABLET ORAL EVERY 12 HOURS SCHEDULED
Qty: 28 TABLET | Refills: 0 | Status: SHIPPED | OUTPATIENT
Start: 2025-06-19 | End: 2025-07-03

## 2025-06-19 NOTE — TELEPHONE ENCOUNTER
Called the pt and left a VM and also asked the pt to call back to the office if he have any questions regarding the message.

## 2025-06-19 NOTE — TELEPHONE ENCOUNTER
Please contact Bob regarding persistent symptoms, with negative urine culture.     He likely has prostatitis, inflammation of prostate gland.     Stop Keflex.   Start a new antibiotic--Bactrim, take 1 tablet twice daily with food for 2 weeks.   Please let me know by early next week if you are not feeling better.   This antibiotic penetrates into the prostate better than keflex.

## 2025-07-08 DIAGNOSIS — E66.812 CLASS 2 SEVERE OBESITY WITH SERIOUS COMORBIDITY AND BODY MASS INDEX (BMI) OF 37.0 TO 37.9 IN ADULT, UNSPECIFIED OBESITY TYPE (HCC): ICD-10-CM

## 2025-07-08 DIAGNOSIS — E66.01 CLASS 2 SEVERE OBESITY WITH SERIOUS COMORBIDITY AND BODY MASS INDEX (BMI) OF 37.0 TO 37.9 IN ADULT, UNSPECIFIED OBESITY TYPE (HCC): ICD-10-CM

## 2025-07-08 NOTE — TELEPHONE ENCOUNTER
Pharmacy change due to insurance change    Reason for call:   [x] Refill   [] Prior Auth  [] Other:     Office:   [x] PCP/Provider - Ward Muñiz MD   [] Specialty/Provider -     Medication: Semaglutide-Weight Management (Wegovy) 2.4 MG/0.75ML     Dose/Frequency:  Inject 2.4 mg under the skin weekly     Quantity: 9 ml    Pharmacy: [facility] [address]    Local Pharmacy   Does the patient have enough for 3 days?   [x] Yes   [] No - Send as HP to POD    Mail Away Pharmacy   Does the patient have enough for 10 days?   [] Yes   [] No - Send as HP to POD

## 2025-07-10 RX ORDER — SEMAGLUTIDE 2.4 MG/.75ML
INJECTION, SOLUTION SUBCUTANEOUS
Qty: 9 ML | Refills: 0 | Status: SHIPPED | OUTPATIENT
Start: 2025-07-10

## 2025-07-14 ENCOUNTER — TELEPHONE (OUTPATIENT)
Dept: FAMILY MEDICINE CLINIC | Facility: CLINIC | Age: 55
End: 2025-07-14

## 2025-07-14 NOTE — TELEPHONE ENCOUNTER
PA for (Wegovy) 2.4 MG/0.75ML SUBMITTED to [facility]    via    []CMM-KEY:   [x]Surescripts-Case ID # N/A  []Availity-Auth ID # NDC #   []Faxed to plan   []Other website   []Phone call Case ID #     [x]PA sent as URGENT    All office notes, labs and other pertaining documents and studies sent. Clinical questions answered. Awaiting determination from insurance company.     Turnaround time for your insurance to make a decision on your Prior Authorization can take 7-21 business days.

## 2025-07-14 NOTE — TELEPHONE ENCOUNTER
Reason for call:   [x] Prior Auth  [] Other: pt is due for a shot this Friday     Caller:  [x] Patient  [] Pharmacy  Name:   Address:   Callback Number:     Medication: wegovy     Dose/Frequency: 2.4 mg inject under the skin weekly     Quantity: 9 mL    Ordering Provider:   [x] PCP/Provider -   [] Speciality/Provider -     Has the patient tried other medications and failed? If failed, which medications did they fail?    [] No   [] Yes -     Is the patient's insurance updated in EPIC?   [] Yes   [x] No   Pt stated he now works for [employer] and has insurance through them  [employer]   ID-[id number]  BIN- [id number]  [facility]- [id number]  [facility]- [id number]    Is a copy of the patient's insurance scanned in EPIC?   [] Yes   [x] No

## 2025-07-16 NOTE — TELEPHONE ENCOUNTER
I called the pharmacy and they had patient using [employer].  I provided new insurance information so they will submit to the insurance.

## 2025-07-17 ENCOUNTER — TELEPHONE (OUTPATIENT)
Dept: FAMILY MEDICINE CLINIC | Facility: CLINIC | Age: 55
End: 2025-07-17

## 2025-07-17 NOTE — TELEPHONE ENCOUNTER
Called and spoke with patient and told him I spoke with him yesterday and that I had to provide the new insurance information yesterday so now he has to wait until they run it and work on a PA.  Patient will wait

## 2025-07-17 NOTE — TELEPHONE ENCOUNTER
Patient states he recently spoke with office and was encouraged to reach out to pharmacy. Pharmacy is telling him he needs to follow up with us because medication needs prior authorization. Previous note states new insurance information was being provided. Was this forwarded to prior auth team? Patient is due for injection tomorrow. Can this be expedited?

## 2025-07-17 NOTE — TELEPHONE ENCOUNTER
Patient does not have [employer].  [facility] which was provided in the PA message

## 2025-07-17 NOTE — TELEPHONE ENCOUNTER
Patient called the RX Refill Line. Message is being forwarded to the office.     Patient is requesting that someone from the office call him back because he called his pharmacy about his Wegovy 2.0 mg/0.7mL, and they told him there are no more refills and was told to call his provider, I look in his chart and it looks like his Wegovy was denied, so he ask if the office could call him back and let him know what is going on with medication, he is due for his  injection tomorrow.    Please contact patient at [phone]

## 2025-07-17 NOTE — TELEPHONE ENCOUNTER
A PA was done for this medication in telephone encounter 7/14/25 through the insurance on file. Came back as excluded and not covered by plan.

## 2025-07-18 NOTE — TELEPHONE ENCOUNTER
Prior authorization call about the medication for Wegovy was approval and the authorization number is [id number] approval from 7/18/25 to 7/18/26. Thank you

## 2025-07-18 NOTE — TELEPHONE ENCOUNTER
PA for (Wegovy) 2.4 MG/0.75ML SUBMITTED to [email or web address]    via    []CMM-KEY:   []Surescripts-Case ID #   []Availity-Auth ID # NDC #   [x]Faxed to plan - completed form scanned into media  []Other website   []Phone call Case ID #     [x]PA sent as URGENT    All office notes, labs and other pertaining documents and studies sent. Clinical questions answered. Awaiting determination from insurance company.     Turnaround time for your insurance to make a decision on your Prior Authorization can take 7-21 business days.